# Patient Record
Sex: MALE | Race: WHITE | NOT HISPANIC OR LATINO | URBAN - METROPOLITAN AREA
[De-identification: names, ages, dates, MRNs, and addresses within clinical notes are randomized per-mention and may not be internally consistent; named-entity substitution may affect disease eponyms.]

---

## 2014-01-06 RX ORDER — RISPERIDONE 4 MG/1
1 TABLET ORAL
Qty: 0 | Refills: 0 | COMMUNITY
Start: 2014-01-06

## 2017-05-24 ENCOUNTER — INPATIENT (INPATIENT)
Facility: HOSPITAL | Age: 57
LOS: 6 days | Discharge: HOME | DRG: 392 | End: 2017-05-31
Attending: INTERNAL MEDICINE | Admitting: HOSPITALIST
Payer: MEDICARE

## 2017-05-24 VITALS
TEMPERATURE: 98 F | OXYGEN SATURATION: 98 % | SYSTOLIC BLOOD PRESSURE: 118 MMHG | HEART RATE: 69 BPM | RESPIRATION RATE: 18 BRPM | DIASTOLIC BLOOD PRESSURE: 80 MMHG

## 2017-05-24 LAB
ALBUMIN SERPL ELPH-MCNC: 4.3 G/DL — SIGNIFICANT CHANGE UP (ref 3.3–5)
ALP SERPL-CCNC: 42 U/L — SIGNIFICANT CHANGE UP (ref 40–120)
ALT FLD-CCNC: 13 U/L RC — SIGNIFICANT CHANGE UP (ref 10–45)
ANION GAP SERPL CALC-SCNC: 13 MMOL/L — SIGNIFICANT CHANGE UP (ref 5–17)
APTT BLD: 29.1 SEC — SIGNIFICANT CHANGE UP (ref 27.5–37.4)
AST SERPL-CCNC: 17 U/L — SIGNIFICANT CHANGE UP (ref 10–40)
BASE EXCESS BLDV CALC-SCNC: 3.8 MMOL/L — HIGH (ref -2–2)
BASOPHILS # BLD AUTO: 0 K/UL — SIGNIFICANT CHANGE UP (ref 0–0.2)
BASOPHILS NFR BLD AUTO: 0.6 % — SIGNIFICANT CHANGE UP (ref 0–2)
BILIRUB SERPL-MCNC: 0.3 MG/DL — SIGNIFICANT CHANGE UP (ref 0.2–1.2)
BUN SERPL-MCNC: 20 MG/DL — SIGNIFICANT CHANGE UP (ref 7–23)
CA-I SERPL-SCNC: 1.19 MMOL/L — SIGNIFICANT CHANGE UP (ref 1.12–1.3)
CALCIUM SERPL-MCNC: 9.4 MG/DL — SIGNIFICANT CHANGE UP (ref 8.4–10.5)
CHLORIDE BLDV-SCNC: 104 MMOL/L — SIGNIFICANT CHANGE UP (ref 96–108)
CHLORIDE SERPL-SCNC: 100 MMOL/L — SIGNIFICANT CHANGE UP (ref 96–108)
CO2 BLDV-SCNC: 31 MMOL/L — HIGH (ref 22–30)
CO2 SERPL-SCNC: 27 MMOL/L — SIGNIFICANT CHANGE UP (ref 22–31)
CREAT SERPL-MCNC: 1.29 MG/DL — SIGNIFICANT CHANGE UP (ref 0.5–1.3)
EOSINOPHIL # BLD AUTO: 0.3 K/UL — SIGNIFICANT CHANGE UP (ref 0–0.5)
EOSINOPHIL NFR BLD AUTO: 4.2 % — SIGNIFICANT CHANGE UP (ref 0–6)
GAS PNL BLDV: 134 MMOL/L — LOW (ref 136–145)
GAS PNL BLDV: SIGNIFICANT CHANGE UP
GLUCOSE BLDV-MCNC: 109 MG/DL — HIGH (ref 70–99)
GLUCOSE SERPL-MCNC: 107 MG/DL — HIGH (ref 70–99)
HCO3 BLDV-SCNC: 29 MMOL/L — SIGNIFICANT CHANGE UP (ref 21–29)
HCT VFR BLD CALC: 38.5 % — LOW (ref 39–50)
HCT VFR BLDA CALC: 42 % — SIGNIFICANT CHANGE UP (ref 39–50)
HGB BLD CALC-MCNC: 13.7 G/DL — SIGNIFICANT CHANGE UP (ref 13–17)
HGB BLD-MCNC: 13.7 G/DL — SIGNIFICANT CHANGE UP (ref 13–17)
INR BLD: 0.97 RATIO — SIGNIFICANT CHANGE UP (ref 0.88–1.16)
LACTATE BLDV-MCNC: 2.1 MMOL/L — HIGH (ref 0.7–2)
LYMPHOCYTES # BLD AUTO: 2.2 K/UL — SIGNIFICANT CHANGE UP (ref 1–3.3)
LYMPHOCYTES # BLD AUTO: 29.5 % — SIGNIFICANT CHANGE UP (ref 13–44)
MCHC RBC-ENTMCNC: 33.1 PG — SIGNIFICANT CHANGE UP (ref 27–34)
MCHC RBC-ENTMCNC: 35.6 GM/DL — SIGNIFICANT CHANGE UP (ref 32–36)
MCV RBC AUTO: 92.9 FL — SIGNIFICANT CHANGE UP (ref 80–100)
MONOCYTES # BLD AUTO: 0.9 K/UL — SIGNIFICANT CHANGE UP (ref 0–0.9)
MONOCYTES NFR BLD AUTO: 12.3 % — SIGNIFICANT CHANGE UP (ref 2–14)
NEUTROPHILS # BLD AUTO: 4 K/UL — SIGNIFICANT CHANGE UP (ref 1.8–7.4)
NEUTROPHILS NFR BLD AUTO: 53.4 % — SIGNIFICANT CHANGE UP (ref 43–77)
PCO2 BLDV: 50 MMHG — SIGNIFICANT CHANGE UP (ref 35–50)
PH BLDV: 7.39 — SIGNIFICANT CHANGE UP (ref 7.35–7.45)
PLATELET # BLD AUTO: 139 K/UL — LOW (ref 150–400)
PO2 BLDV: 43 MMHG — SIGNIFICANT CHANGE UP (ref 25–45)
POTASSIUM BLDV-SCNC: 3.9 MMOL/L — SIGNIFICANT CHANGE UP (ref 3.5–5)
POTASSIUM SERPL-MCNC: 4.3 MMOL/L — SIGNIFICANT CHANGE UP (ref 3.5–5.3)
POTASSIUM SERPL-SCNC: 4.3 MMOL/L — SIGNIFICANT CHANGE UP (ref 3.5–5.3)
PROT SERPL-MCNC: 7.3 G/DL — SIGNIFICANT CHANGE UP (ref 6–8.3)
PROTHROM AB SERPL-ACNC: 10.6 SEC — SIGNIFICANT CHANGE UP (ref 9.8–12.7)
RBC # BLD: 4.15 M/UL — LOW (ref 4.2–5.8)
RBC # FLD: 12.5 % — SIGNIFICANT CHANGE UP (ref 10.3–14.5)
SAO2 % BLDV: 76 % — SIGNIFICANT CHANGE UP (ref 67–88)
SODIUM SERPL-SCNC: 140 MMOL/L — SIGNIFICANT CHANGE UP (ref 135–145)
WBC # BLD: 7.5 K/UL — SIGNIFICANT CHANGE UP (ref 3.8–10.5)
WBC # FLD AUTO: 7.5 K/UL — SIGNIFICANT CHANGE UP (ref 3.8–10.5)

## 2017-05-24 PROCEDURE — 99285 EMERGENCY DEPT VISIT HI MDM: CPT

## 2017-05-24 PROCEDURE — 71010: CPT | Mod: 26

## 2017-05-24 PROCEDURE — 74177 CT ABD & PELVIS W/CONTRAST: CPT | Mod: 26

## 2017-05-24 RX ORDER — ACETAMINOPHEN 500 MG
1000 TABLET ORAL ONCE
Qty: 0 | Refills: 0 | Status: COMPLETED | OUTPATIENT
Start: 2017-05-24 | End: 2017-05-24

## 2017-05-24 RX ORDER — SODIUM CHLORIDE 9 MG/ML
1000 INJECTION INTRAMUSCULAR; INTRAVENOUS; SUBCUTANEOUS ONCE
Qty: 0 | Refills: 0 | Status: COMPLETED | OUTPATIENT
Start: 2017-05-24 | End: 2017-05-24

## 2017-05-24 RX ADMIN — Medication 400 MILLIGRAM(S): at 23:19

## 2017-05-24 RX ADMIN — SODIUM CHLORIDE 1000 MILLILITER(S): 9 INJECTION INTRAMUSCULAR; INTRAVENOUS; SUBCUTANEOUS at 21:17

## 2017-05-24 NOTE — ED PROVIDER NOTE - PROGRESS NOTE DETAILS
Paged dr. Vasquez x 3 over 3 hours. will call hospitalist to admit. Spoke with patient sister who is HCP. Agreeable to IV abx and admission. Paged dr. Vasquez x 3 over 3 hours. will call hospitalist to admit. Patient with divertic. given iv abx.

## 2017-05-24 NOTE — ED PROVIDER NOTE - PMH
Bipolar Disorder    Hydronephrosis with Renal and Ureteral Calculous Obstruction  at last admission in [facility] ([place]) 2010  Hypothyroidism    MR (mental retardation)    Nephrolithiasis

## 2017-05-24 NOTE — ED PROVIDER NOTE - CONSTITUTIONAL, MLM
normal... Well appearing, well nourished, awake, alert, oriented to person, place, time/situation and in no apparent distress. Well appearing, well nourished, slow to answer awake, alert, oriented to person, place, time/situation and in no apparent distress.

## 2017-05-24 NOTE — ED ADULT NURSE NOTE - OBJECTIVE STATEMENT
57 year old male with co abd pain/flank pain L sided. hx of kidney stones. pt coming from nursing home. no fever no chills.

## 2017-05-24 NOTE — ED PROVIDER NOTE - PRINCIPAL DIAGNOSIS
Diverticulitis of intestine without perforation or abscess without bleeding, unspecified part of intestinal tract

## 2017-05-24 NOTE — ED PROVIDER NOTE - MEDICAL DECISION MAKING DETAILS
José Antonio: Patient with abdominal pain over past 1 week. poor historian. history of stones. will get labs, ua, ivf, ct a/p, reassess.

## 2017-05-24 NOTE — ED PROVIDER NOTE - CARE PLAN
Principal Discharge DX:	Diverticulitis of intestine without perforation or abscess without bleeding, unspecified part of intestinal tract

## 2017-05-24 NOTE — ED PROVIDER NOTE - OBJECTIVE STATEMENT
57 year old male w NPH, Hypothyroidism, Mild intellectual difficulties, bipolar disorder prior renal stone presents w complaint of progressively worsening left flank pain and left sided abdominal pain for the past 1 week. Patient is a poor historian. Denies nausea, vomiting, fever. 57 year old male [facility] h/o intellectual disability, BPD, hypothyroidism, nephrolithiasis s/p lithotripsy, normal pressure hydrocephalus s/p drainage x2, hypercholesterolemia, Parkinson's disease vs drug-induced movement disorder prior renal stone presents w complaint of progressively worsening left flank pain and left sided abdominal pain for the past 1 week. Patient is a poor historian. Denies nausea, vomiting, fever. 57 year old male from [facility] h/o intellectual disability, BPD, hypothyroidism, nephrolithiasis s/p lithotripsy, normal pressure hydrocephalus s/p drainage x2, hypercholesterolemia, history of narcotic dependence, Parkinson's disease vs drug-induced movement disorder prior renal stone presents w complaint of progressively worsening left flank pain and left sided abdominal pain for the past 1 week. Patient is a poor historian. Denies nausea, vomiting, fever.

## 2017-05-25 DIAGNOSIS — Z29.9 ENCOUNTER FOR PROPHYLACTIC MEASURES, UNSPECIFIED: ICD-10-CM

## 2017-05-25 DIAGNOSIS — E03.9 HYPOTHYROIDISM, UNSPECIFIED: ICD-10-CM

## 2017-05-25 DIAGNOSIS — K57.92 DIVERTICULITIS OF INTESTINE, PART UNSPECIFIED, WITHOUT PERFORATION OR ABSCESS WITHOUT BLEEDING: ICD-10-CM

## 2017-05-25 DIAGNOSIS — F31.9 BIPOLAR DISORDER, UNSPECIFIED: ICD-10-CM

## 2017-05-25 LAB
ANION GAP SERPL CALC-SCNC: 12 MMOL/L — SIGNIFICANT CHANGE UP (ref 5–17)
APPEARANCE UR: CLEAR — SIGNIFICANT CHANGE UP
BASOPHILS # BLD AUTO: 0 K/UL — SIGNIFICANT CHANGE UP (ref 0–0.2)
BASOPHILS NFR BLD AUTO: 1 % — SIGNIFICANT CHANGE UP (ref 0–2)
BILIRUB UR-MCNC: NEGATIVE — SIGNIFICANT CHANGE UP
BUN SERPL-MCNC: 14 MG/DL — SIGNIFICANT CHANGE UP (ref 7–23)
CALCIUM SERPL-MCNC: 8.4 MG/DL — SIGNIFICANT CHANGE UP (ref 8.4–10.5)
CHLORIDE SERPL-SCNC: 105 MMOL/L — SIGNIFICANT CHANGE UP (ref 96–108)
CO2 SERPL-SCNC: 22 MMOL/L — SIGNIFICANT CHANGE UP (ref 22–31)
COLOR SPEC: YELLOW — SIGNIFICANT CHANGE UP
CREAT SERPL-MCNC: 0.91 MG/DL — SIGNIFICANT CHANGE UP (ref 0.5–1.3)
DIFF PNL FLD: NEGATIVE — SIGNIFICANT CHANGE UP
EOSINOPHIL # BLD AUTO: 0.2 K/UL — SIGNIFICANT CHANGE UP (ref 0–0.5)
EOSINOPHIL NFR BLD AUTO: 4.9 % — SIGNIFICANT CHANGE UP (ref 0–6)
GLUCOSE SERPL-MCNC: 89 MG/DL — SIGNIFICANT CHANGE UP (ref 70–99)
GLUCOSE UR QL: NEGATIVE — SIGNIFICANT CHANGE UP
HCT VFR BLD CALC: 35.3 % — LOW (ref 39–50)
HGB BLD-MCNC: 12.3 G/DL — LOW (ref 13–17)
KETONES UR-MCNC: NEGATIVE — SIGNIFICANT CHANGE UP
LEUKOCYTE ESTERASE UR-ACNC: NEGATIVE — SIGNIFICANT CHANGE UP
LYMPHOCYTES # BLD AUTO: 1.3 K/UL — SIGNIFICANT CHANGE UP (ref 1–3.3)
LYMPHOCYTES # BLD AUTO: 32.7 % — SIGNIFICANT CHANGE UP (ref 13–44)
MCHC RBC-ENTMCNC: 32.2 PG — SIGNIFICANT CHANGE UP (ref 27–34)
MCHC RBC-ENTMCNC: 34.9 GM/DL — SIGNIFICANT CHANGE UP (ref 32–36)
MCV RBC AUTO: 92.3 FL — SIGNIFICANT CHANGE UP (ref 80–100)
MONOCYTES # BLD AUTO: 0.5 K/UL — SIGNIFICANT CHANGE UP (ref 0–0.9)
MONOCYTES NFR BLD AUTO: 12.5 % — SIGNIFICANT CHANGE UP (ref 2–14)
NEUTROPHILS # BLD AUTO: 2 K/UL — SIGNIFICANT CHANGE UP (ref 1.8–7.4)
NEUTROPHILS NFR BLD AUTO: 48.9 % — SIGNIFICANT CHANGE UP (ref 43–77)
NITRITE UR-MCNC: NEGATIVE — SIGNIFICANT CHANGE UP
PH UR: 6.5 — SIGNIFICANT CHANGE UP (ref 5–8)
PLATELET # BLD AUTO: 116 K/UL — LOW (ref 150–400)
POTASSIUM SERPL-MCNC: 4.1 MMOL/L — SIGNIFICANT CHANGE UP (ref 3.5–5.3)
POTASSIUM SERPL-SCNC: 4.1 MMOL/L — SIGNIFICANT CHANGE UP (ref 3.5–5.3)
PROT UR-MCNC: NEGATIVE — SIGNIFICANT CHANGE UP
RBC # BLD: 3.83 M/UL — LOW (ref 4.2–5.8)
RBC # FLD: 12.5 % — SIGNIFICANT CHANGE UP (ref 10.3–14.5)
SODIUM SERPL-SCNC: 139 MMOL/L — SIGNIFICANT CHANGE UP (ref 135–145)
SP GR SPEC: 1.02 — SIGNIFICANT CHANGE UP (ref 1.01–1.02)
UROBILINOGEN FLD QL: NEGATIVE — SIGNIFICANT CHANGE UP
WBC # BLD: 4 K/UL — SIGNIFICANT CHANGE UP (ref 3.8–10.5)
WBC # FLD AUTO: 4 K/UL — SIGNIFICANT CHANGE UP (ref 3.8–10.5)

## 2017-05-25 PROCEDURE — 99223 1ST HOSP IP/OBS HIGH 75: CPT

## 2017-05-25 RX ORDER — MECLIZINE HCL 12.5 MG
25 TABLET ORAL AT BEDTIME
Qty: 0 | Refills: 0 | Status: DISCONTINUED | OUTPATIENT
Start: 2017-05-25 | End: 2017-05-31

## 2017-05-25 RX ORDER — METRONIDAZOLE 500 MG
TABLET ORAL
Qty: 0 | Refills: 0 | Status: DISCONTINUED | OUTPATIENT
Start: 2017-05-25 | End: 2017-05-28

## 2017-05-25 RX ORDER — BENZTROPINE MESYLATE 1 MG
0.5 TABLET ORAL DAILY
Qty: 0 | Refills: 0 | Status: DISCONTINUED | OUTPATIENT
Start: 2017-05-25 | End: 2017-05-31

## 2017-05-25 RX ORDER — MECLIZINE HCL 12.5 MG
12.5 TABLET ORAL DAILY
Qty: 0 | Refills: 0 | Status: DISCONTINUED | OUTPATIENT
Start: 2017-05-25 | End: 2017-05-31

## 2017-05-25 RX ORDER — CIPROFLOXACIN LACTATE 400MG/40ML
400 VIAL (ML) INTRAVENOUS EVERY 12 HOURS
Qty: 0 | Refills: 0 | Status: DISCONTINUED | OUTPATIENT
Start: 2017-05-25 | End: 2017-05-28

## 2017-05-25 RX ORDER — METRONIDAZOLE 500 MG
500 TABLET ORAL ONCE
Qty: 0 | Refills: 0 | Status: COMPLETED | OUTPATIENT
Start: 2017-05-25 | End: 2017-05-25

## 2017-05-25 RX ORDER — IBUPROFEN 200 MG
200 TABLET ORAL EVERY 8 HOURS
Qty: 0 | Refills: 0 | Status: DISCONTINUED | OUTPATIENT
Start: 2017-05-25 | End: 2017-05-26

## 2017-05-25 RX ORDER — KETOROLAC TROMETHAMINE 30 MG/ML
15 SYRINGE (ML) INJECTION ONCE
Qty: 0 | Refills: 0 | Status: DISCONTINUED | OUTPATIENT
Start: 2017-05-25 | End: 2017-05-25

## 2017-05-25 RX ORDER — SIMVASTATIN 20 MG/1
20 TABLET, FILM COATED ORAL AT BEDTIME
Qty: 0 | Refills: 0 | Status: DISCONTINUED | OUTPATIENT
Start: 2017-05-25 | End: 2017-05-31

## 2017-05-25 RX ORDER — ACETAMINOPHEN 500 MG
500 TABLET ORAL EVERY 6 HOURS
Qty: 0 | Refills: 0 | Status: DISCONTINUED | OUTPATIENT
Start: 2017-05-25 | End: 2017-05-31

## 2017-05-25 RX ORDER — ONDANSETRON 8 MG/1
4 TABLET, FILM COATED ORAL ONCE
Qty: 0 | Refills: 0 | Status: COMPLETED | OUTPATIENT
Start: 2017-05-25 | End: 2017-05-25

## 2017-05-25 RX ORDER — RISPERIDONE 4 MG/1
1.5 TABLET ORAL
Qty: 0 | Refills: 0 | Status: DISCONTINUED | OUTPATIENT
Start: 2017-05-25 | End: 2017-05-31

## 2017-05-25 RX ORDER — PANTOPRAZOLE SODIUM 20 MG/1
40 TABLET, DELAYED RELEASE ORAL
Qty: 0 | Refills: 0 | Status: DISCONTINUED | OUTPATIENT
Start: 2017-05-25 | End: 2017-05-31

## 2017-05-25 RX ORDER — CIPROFLOXACIN LACTATE 400MG/40ML
400 VIAL (ML) INTRAVENOUS ONCE
Qty: 0 | Refills: 0 | Status: COMPLETED | OUTPATIENT
Start: 2017-05-25 | End: 2017-05-25

## 2017-05-25 RX ORDER — DIVALPROEX SODIUM 500 MG/1
750 TABLET, DELAYED RELEASE ORAL
Qty: 0 | Refills: 0 | Status: DISCONTINUED | OUTPATIENT
Start: 2017-05-25 | End: 2017-05-31

## 2017-05-25 RX ORDER — LEVOTHYROXINE SODIUM 125 MCG
75 TABLET ORAL DAILY
Qty: 0 | Refills: 0 | Status: DISCONTINUED | OUTPATIENT
Start: 2017-05-25 | End: 2017-05-31

## 2017-05-25 RX ORDER — SODIUM CHLORIDE 9 MG/ML
1000 INJECTION INTRAMUSCULAR; INTRAVENOUS; SUBCUTANEOUS ONCE
Qty: 0 | Refills: 0 | Status: COMPLETED | OUTPATIENT
Start: 2017-05-25 | End: 2017-05-25

## 2017-05-25 RX ORDER — DOCUSATE SODIUM 100 MG
300 CAPSULE ORAL DAILY
Qty: 0 | Refills: 0 | Status: DISCONTINUED | OUTPATIENT
Start: 2017-05-25 | End: 2017-05-31

## 2017-05-25 RX ORDER — ONDANSETRON 8 MG/1
4 TABLET, FILM COATED ORAL EVERY 6 HOURS
Qty: 0 | Refills: 0 | Status: DISCONTINUED | OUTPATIENT
Start: 2017-05-25 | End: 2017-05-31

## 2017-05-25 RX ORDER — HEPARIN SODIUM 5000 [USP'U]/ML
5000 INJECTION INTRAVENOUS; SUBCUTANEOUS EVERY 12 HOURS
Qty: 0 | Refills: 0 | Status: DISCONTINUED | OUTPATIENT
Start: 2017-05-25 | End: 2017-05-28

## 2017-05-25 RX ORDER — METRONIDAZOLE 500 MG
500 TABLET ORAL EVERY 8 HOURS
Qty: 0 | Refills: 0 | Status: DISCONTINUED | OUTPATIENT
Start: 2017-05-25 | End: 2017-05-28

## 2017-05-25 RX ADMIN — DIVALPROEX SODIUM 750 MILLIGRAM(S): 500 TABLET, DELAYED RELEASE ORAL at 17:49

## 2017-05-25 RX ADMIN — Medication 0.5 MILLIGRAM(S): at 17:49

## 2017-05-25 RX ADMIN — RISPERIDONE 1.5 MILLIGRAM(S): 4 TABLET ORAL at 17:49

## 2017-05-25 RX ADMIN — Medication 75 MICROGRAM(S): at 12:42

## 2017-05-25 RX ADMIN — Medication 1000 MILLIGRAM(S): at 00:36

## 2017-05-25 RX ADMIN — HEPARIN SODIUM 5000 UNIT(S): 5000 INJECTION INTRAVENOUS; SUBCUTANEOUS at 17:48

## 2017-05-25 RX ADMIN — Medication 100 MILLIGRAM(S): at 11:26

## 2017-05-25 RX ADMIN — Medication 200 MILLIGRAM(S): at 17:48

## 2017-05-25 RX ADMIN — SIMVASTATIN 20 MILLIGRAM(S): 20 TABLET, FILM COATED ORAL at 21:33

## 2017-05-25 RX ADMIN — SODIUM CHLORIDE 1000 MILLILITER(S): 9 INJECTION INTRAMUSCULAR; INTRAVENOUS; SUBCUTANEOUS at 00:36

## 2017-05-25 RX ADMIN — Medication 300 MILLIGRAM(S): at 12:41

## 2017-05-25 RX ADMIN — Medication 200 MILLIGRAM(S): at 01:08

## 2017-05-25 RX ADMIN — Medication 100 MILLIGRAM(S): at 21:31

## 2017-05-25 RX ADMIN — PANTOPRAZOLE SODIUM 40 MILLIGRAM(S): 20 TABLET, DELAYED RELEASE ORAL at 12:42

## 2017-05-25 RX ADMIN — Medication 100 MILLIGRAM(S): at 01:00

## 2017-05-25 RX ADMIN — Medication 15 MILLIGRAM(S): at 06:31

## 2017-05-25 RX ADMIN — ONDANSETRON 4 MILLIGRAM(S): 8 TABLET, FILM COATED ORAL at 01:55

## 2017-05-25 NOTE — H&P ADULT. - LAB RESULTS AND INTERPRETATION
Personally reviewed labs. Labs notable for no leukocytosis WBC 7.5 Hb 13.7 Plt 139 BMP Cr 1.29 K 4.3 LFTs wnl;  VBG with lactae 2.1  UA negative.

## 2017-05-25 NOTE — H&P ADULT. - NSHPATTENDINGPLANDISCUSS_GEN_ALL_CORE
Jared (patient's sister and RN niece) per his request regarding plan of care. Additionally d/w covering PA clinical assessment and plan of care.

## 2017-05-25 NOTE — H&P ADULT. - ASSESSMENT
56M from [facility] h/o intellectual disability, BPD, hypothyroidism, nephrolithiasis s/p lithotripsy, normal pressure hydrocephalus s/p drainage x2, hypercholesterolemia, Parkinson's disease vs drug-induced movement disorder, GERD p/w abdominal pain likely 2/2 acute diverticulitis seen on CT Abd/Pelvis.

## 2017-05-25 NOTE — H&P ADULT. - PMH
Bipolar Disorder    Hydronephrosis with Renal and Ureteral Calculous Obstruction  at last admission in [facility] ([place]) 2010  Hypothyroidism    MR (mental retardation)    Nephrolithiasis

## 2017-05-25 NOTE — H&P ADULT. - PROBLEM SELECTOR PLAN 1
p/w abdominal pain and nausea. Tolerating PO; No leukocytosis, lactate 2.1; Appropriately hydrated.   - Flagyl/Cipro IV; if able to tolerate PO and pain well controlled, then switch to PO regimen   - Tylenol/Motrin prn pain; pt and family requested no narcotics  - Will trial regular diet per patient request and if unable to tolerate then will start full liquids  - c/w colace, held other bowel regimen given current symptom of diarrhea  - Consider GI consult if symptoms fail to improve; Per patient's sister and niece his last colo was 8 yrs ago and they have significant fam hx of colon Ca and are concerned with setting up OP follow up with GI for colo. Explained that colo often deferred in setting of acute diverticulitis.

## 2017-05-25 NOTE — H&P ADULT. - FAMILY HISTORY
Sibling  Still living? Yes, Estimated age: 51-60  Family history of hyperparathyroidism, Age at diagnosis: Age Unknown

## 2017-05-25 NOTE — PATIENT PROFILE ADULT. - VISION (WITH CORRECTIVE LENSES IF THE PATIENT USUALLY WEARS THEM):
Reading glasses/Normal vision: sees adequately in most situations; can see medication labels, newsprint

## 2017-05-25 NOTE — H&P ADULT. - RADIOLOGY RESULTS AND INTERPRETATION
Personally reviewed imaging. CT Abd Pelvis with evidence of acute diverticulitis and no evidence of acute collection. CXR clear

## 2017-05-25 NOTE — H&P ADULT. - OTHER
Reviewed previous hospitalization records in [facility]; last hospitalized Sept 2016 for syncope found to have hyperparathyroidism now s/p parathyroidectomy and also found to have vertigo.

## 2017-05-25 NOTE — H&P ADULT. - HISTORY OF PRESENT ILLNESS
57 yo gentleman from [facility] h/o intellectual disability, BPD, hypothyroidism, nephrolithiasis s/p lithotripsy, normal pressure hydrocephalus s/p drainage x2, hypercholesterolemia, Parkinson's disease vs drug-induced movement disorder, GERD presenting       Initial vitals--Afebrile, HR 69, 118/80 18 98%   Labs notable for no leukocytosis WBC 7.5 Hb 13.7 Plt 139 BMP Cr 1.29 K 4.3 LFTs wnl;  VBG with lactae 2.1  UA negative. CT Abd Pelvis with evidence of acute diverticulitis and no evidence of acute collection. CXR clear     In the ED, he received Cipro/Flagyl, 2L Ns, Toradol, Tylenol and Zofran     Admitted to medicine for further evaluation and management. 55 yo gentleman from [facility] w/intellectual disability, BPD, hypothyroidism, nephrolithiasis s/p lithotripsy, normal pressure hydrocephalus s/p drainage x2, hypercholesterolemia, Parkinson's disease vs drug-induced movement disorder, GERD and hx of narcotic dependence p/w abdominal pain for the past month worse over the last few days. He reports subjective fever once last week and diarrhea without blood. He described the abdominal pain as intermittent 7/10 worse after eating. Reported associated nausea, no emesis. No recent travel or sick contacts. No dysuria. Does report flank pain. He reported that he does not want any morphine or percocet for pain control given his hx of abuse and would prefer to take tylenol and motrin only. ROS otherwise negative.     Initial vitals--Afebrile, HR 69, 118/80 18 98%   Labs notable for no leukocytosis WBC 7.5 Hb 13.7 Plt 139 BMP Cr 1.29 K 4.3 LFTs wnl;  VBG with lactae 2.1  UA negative. CT Abd Pelvis with evidence of acute diverticulitis and no evidence of acute collection. CXR clear     In the ED, he received Cipro/Flagyl, 2L Ns, Toradol, Tylenol and Zofran     Admitted to medicine for further evaluation and management.

## 2017-05-26 LAB
ANION GAP SERPL CALC-SCNC: 10 MMOL/L — SIGNIFICANT CHANGE UP (ref 5–17)
BASOPHILS # BLD AUTO: 0.03 K/UL — SIGNIFICANT CHANGE UP (ref 0–0.2)
BASOPHILS NFR BLD AUTO: 0.7 % — SIGNIFICANT CHANGE UP (ref 0–2)
BUN SERPL-MCNC: 14 MG/DL — SIGNIFICANT CHANGE UP (ref 7–23)
CALCIUM SERPL-MCNC: 9 MG/DL — SIGNIFICANT CHANGE UP (ref 8.4–10.5)
CHLORIDE SERPL-SCNC: 108 MMOL/L — SIGNIFICANT CHANGE UP (ref 96–108)
CO2 SERPL-SCNC: 24 MMOL/L — SIGNIFICANT CHANGE UP (ref 22–31)
CREAT SERPL-MCNC: 1.11 MG/DL — SIGNIFICANT CHANGE UP (ref 0.5–1.3)
EOSINOPHIL # BLD AUTO: 0.38 K/UL — SIGNIFICANT CHANGE UP (ref 0–0.5)
EOSINOPHIL NFR BLD AUTO: 9.1 % — HIGH (ref 0–6)
GLUCOSE SERPL-MCNC: 85 MG/DL — SIGNIFICANT CHANGE UP (ref 70–99)
HCT VFR BLD CALC: 40.7 % — SIGNIFICANT CHANGE UP (ref 39–50)
HGB BLD-MCNC: 13.1 G/DL — SIGNIFICANT CHANGE UP (ref 13–17)
IMM GRANULOCYTES NFR BLD AUTO: 0.2 % — SIGNIFICANT CHANGE UP (ref 0–1.5)
LYMPHOCYTES # BLD AUTO: 1.74 K/UL — SIGNIFICANT CHANGE UP (ref 1–3.3)
LYMPHOCYTES # BLD AUTO: 41.6 % — SIGNIFICANT CHANGE UP (ref 13–44)
MAGNESIUM SERPL-MCNC: 2.3 MG/DL — SIGNIFICANT CHANGE UP (ref 1.6–2.6)
MCHC RBC-ENTMCNC: 30 PG — SIGNIFICANT CHANGE UP (ref 27–34)
MCHC RBC-ENTMCNC: 32.2 GM/DL — SIGNIFICANT CHANGE UP (ref 32–36)
MCV RBC AUTO: 93.3 FL — SIGNIFICANT CHANGE UP (ref 80–100)
MONOCYTES # BLD AUTO: 0.54 K/UL — SIGNIFICANT CHANGE UP (ref 0–0.9)
MONOCYTES NFR BLD AUTO: 12.9 % — SIGNIFICANT CHANGE UP (ref 2–14)
NEUTROPHILS # BLD AUTO: 1.48 K/UL — LOW (ref 1.8–7.4)
NEUTROPHILS NFR BLD AUTO: 35.5 % — LOW (ref 43–77)
PLATELET # BLD AUTO: 132 K/UL — LOW (ref 150–400)
POTASSIUM SERPL-MCNC: 4.5 MMOL/L — SIGNIFICANT CHANGE UP (ref 3.5–5.3)
POTASSIUM SERPL-SCNC: 4.5 MMOL/L — SIGNIFICANT CHANGE UP (ref 3.5–5.3)
RBC # BLD: 4.36 M/UL — SIGNIFICANT CHANGE UP (ref 4.2–5.8)
RBC # FLD: 13.9 % — SIGNIFICANT CHANGE UP (ref 10.3–14.5)
SODIUM SERPL-SCNC: 142 MMOL/L — SIGNIFICANT CHANGE UP (ref 135–145)
WBC # BLD: 4.18 K/UL — SIGNIFICANT CHANGE UP (ref 3.8–10.5)
WBC # FLD AUTO: 4.18 K/UL — SIGNIFICANT CHANGE UP (ref 3.8–10.5)

## 2017-05-26 RX ORDER — IBUPROFEN 200 MG
800 TABLET ORAL EVERY 8 HOURS
Qty: 0 | Refills: 0 | Status: DISCONTINUED | OUTPATIENT
Start: 2017-05-26 | End: 2017-05-31

## 2017-05-26 RX ORDER — SODIUM CHLORIDE 9 MG/ML
1000 INJECTION INTRAMUSCULAR; INTRAVENOUS; SUBCUTANEOUS
Qty: 0 | Refills: 0 | Status: DISCONTINUED | OUTPATIENT
Start: 2017-05-26 | End: 2017-05-29

## 2017-05-26 RX ADMIN — Medication 0.5 MILLIGRAM(S): at 12:18

## 2017-05-26 RX ADMIN — Medication 75 MICROGRAM(S): at 06:28

## 2017-05-26 RX ADMIN — Medication 25 MILLIGRAM(S): at 00:39

## 2017-05-26 RX ADMIN — PANTOPRAZOLE SODIUM 40 MILLIGRAM(S): 20 TABLET, DELAYED RELEASE ORAL at 06:28

## 2017-05-26 RX ADMIN — Medication 100 MILLIGRAM(S): at 06:20

## 2017-05-26 RX ADMIN — Medication 500 MILLIGRAM(S): at 19:32

## 2017-05-26 RX ADMIN — Medication 800 MILLIGRAM(S): at 17:00

## 2017-05-26 RX ADMIN — Medication 100 MILLIGRAM(S): at 21:17

## 2017-05-26 RX ADMIN — RISPERIDONE 1.5 MILLIGRAM(S): 4 TABLET ORAL at 06:27

## 2017-05-26 RX ADMIN — Medication 100 MILLIGRAM(S): at 15:16

## 2017-05-26 RX ADMIN — HEPARIN SODIUM 5000 UNIT(S): 5000 INJECTION INTRAVENOUS; SUBCUTANEOUS at 16:57

## 2017-05-26 RX ADMIN — Medication 300 MILLIGRAM(S): at 12:18

## 2017-05-26 RX ADMIN — ONDANSETRON 4 MILLIGRAM(S): 8 TABLET, FILM COATED ORAL at 15:33

## 2017-05-26 RX ADMIN — Medication 500 MILLIGRAM(S): at 20:02

## 2017-05-26 RX ADMIN — HEPARIN SODIUM 5000 UNIT(S): 5000 INJECTION INTRAVENOUS; SUBCUTANEOUS at 06:27

## 2017-05-26 RX ADMIN — SIMVASTATIN 20 MILLIGRAM(S): 20 TABLET, FILM COATED ORAL at 21:17

## 2017-05-26 RX ADMIN — DIVALPROEX SODIUM 750 MILLIGRAM(S): 500 TABLET, DELAYED RELEASE ORAL at 06:27

## 2017-05-26 RX ADMIN — Medication 25 MILLIGRAM(S): at 21:17

## 2017-05-26 RX ADMIN — Medication 200 MILLIGRAM(S): at 17:16

## 2017-05-26 RX ADMIN — Medication 12.5 MILLIGRAM(S): at 12:18

## 2017-05-26 RX ADMIN — Medication 200 MILLIGRAM(S): at 06:28

## 2017-05-26 RX ADMIN — Medication 800 MILLIGRAM(S): at 16:32

## 2017-05-26 RX ADMIN — RISPERIDONE 1.5 MILLIGRAM(S): 4 TABLET ORAL at 16:57

## 2017-05-26 RX ADMIN — DIVALPROEX SODIUM 750 MILLIGRAM(S): 500 TABLET, DELAYED RELEASE ORAL at 16:57

## 2017-05-27 LAB
BASOPHILS # BLD AUTO: 0.03 K/UL — SIGNIFICANT CHANGE UP (ref 0–0.2)
BASOPHILS NFR BLD AUTO: 0.6 % — SIGNIFICANT CHANGE UP (ref 0–2)
EOSINOPHIL # BLD AUTO: 0.37 K/UL — SIGNIFICANT CHANGE UP (ref 0–0.5)
EOSINOPHIL NFR BLD AUTO: 7.8 % — HIGH (ref 0–6)
HCT VFR BLD CALC: 37.4 % — LOW (ref 39–50)
HGB BLD-MCNC: 12.6 G/DL — LOW (ref 13–17)
IMM GRANULOCYTES NFR BLD AUTO: 0.2 % — SIGNIFICANT CHANGE UP (ref 0–1.5)
LYMPHOCYTES # BLD AUTO: 1.75 K/UL — SIGNIFICANT CHANGE UP (ref 1–3.3)
LYMPHOCYTES # BLD AUTO: 36.9 % — SIGNIFICANT CHANGE UP (ref 13–44)
MCHC RBC-ENTMCNC: 30.5 PG — SIGNIFICANT CHANGE UP (ref 27–34)
MCHC RBC-ENTMCNC: 33.7 GM/DL — SIGNIFICANT CHANGE UP (ref 32–36)
MCV RBC AUTO: 90.6 FL — SIGNIFICANT CHANGE UP (ref 80–100)
MONOCYTES # BLD AUTO: 0.63 K/UL — SIGNIFICANT CHANGE UP (ref 0–0.9)
MONOCYTES NFR BLD AUTO: 13.3 % — SIGNIFICANT CHANGE UP (ref 2–14)
NEUTROPHILS # BLD AUTO: 1.95 K/UL — SIGNIFICANT CHANGE UP (ref 1.8–7.4)
NEUTROPHILS NFR BLD AUTO: 41.2 % — LOW (ref 43–77)
PLATELET # BLD AUTO: 160 K/UL — SIGNIFICANT CHANGE UP (ref 150–400)
RBC # BLD: 4.13 M/UL — LOW (ref 4.2–5.8)
RBC # FLD: 13.7 % — SIGNIFICANT CHANGE UP (ref 10.3–14.5)
WBC # BLD: 4.74 K/UL — SIGNIFICANT CHANGE UP (ref 3.8–10.5)
WBC # FLD AUTO: 4.74 K/UL — SIGNIFICANT CHANGE UP (ref 3.8–10.5)

## 2017-05-27 RX ORDER — ACETAMINOPHEN 500 MG
1000 TABLET ORAL ONCE
Qty: 0 | Refills: 0 | Status: COMPLETED | OUTPATIENT
Start: 2017-05-27 | End: 2017-05-27

## 2017-05-27 RX ORDER — POLYETHYLENE GLYCOL 3350 17 G/17G
17 POWDER, FOR SOLUTION ORAL DAILY
Qty: 0 | Refills: 0 | Status: DISCONTINUED | OUTPATIENT
Start: 2017-05-27 | End: 2017-05-28

## 2017-05-27 RX ORDER — POLYETHYLENE GLYCOL 3350 17 G/17G
17 POWDER, FOR SOLUTION ORAL
Qty: 0 | Refills: 0 | Status: DISCONTINUED | OUTPATIENT
Start: 2017-05-27 | End: 2017-05-28

## 2017-05-27 RX ORDER — SENNA PLUS 8.6 MG/1
2 TABLET ORAL AT BEDTIME
Qty: 0 | Refills: 0 | Status: DISCONTINUED | OUTPATIENT
Start: 2017-05-27 | End: 2017-05-31

## 2017-05-27 RX ADMIN — PANTOPRAZOLE SODIUM 40 MILLIGRAM(S): 20 TABLET, DELAYED RELEASE ORAL at 06:43

## 2017-05-27 RX ADMIN — Medication 800 MILLIGRAM(S): at 03:57

## 2017-05-27 RX ADMIN — ONDANSETRON 4 MILLIGRAM(S): 8 TABLET, FILM COATED ORAL at 16:50

## 2017-05-27 RX ADMIN — Medication 800 MILLIGRAM(S): at 03:27

## 2017-05-27 RX ADMIN — Medication 500 MILLIGRAM(S): at 08:57

## 2017-05-27 RX ADMIN — Medication 800 MILLIGRAM(S): at 12:10

## 2017-05-27 RX ADMIN — Medication 1000 MILLIGRAM(S): at 17:36

## 2017-05-27 RX ADMIN — SENNA PLUS 2 TABLET(S): 8.6 TABLET ORAL at 22:14

## 2017-05-27 RX ADMIN — Medication 75 MICROGRAM(S): at 06:43

## 2017-05-27 RX ADMIN — Medication 100 MILLIGRAM(S): at 06:42

## 2017-05-27 RX ADMIN — Medication 100 MILLIGRAM(S): at 13:25

## 2017-05-27 RX ADMIN — Medication 12.5 MILLIGRAM(S): at 12:10

## 2017-05-27 RX ADMIN — DIVALPROEX SODIUM 750 MILLIGRAM(S): 500 TABLET, DELAYED RELEASE ORAL at 17:11

## 2017-05-27 RX ADMIN — Medication 0.5 MILLIGRAM(S): at 12:09

## 2017-05-27 RX ADMIN — SIMVASTATIN 20 MILLIGRAM(S): 20 TABLET, FILM COATED ORAL at 22:14

## 2017-05-27 RX ADMIN — ONDANSETRON 4 MILLIGRAM(S): 8 TABLET, FILM COATED ORAL at 03:27

## 2017-05-27 RX ADMIN — SODIUM CHLORIDE 100 MILLILITER(S): 9 INJECTION INTRAMUSCULAR; INTRAVENOUS; SUBCUTANEOUS at 22:14

## 2017-05-27 RX ADMIN — Medication 300 MILLIGRAM(S): at 12:09

## 2017-05-27 RX ADMIN — DIVALPROEX SODIUM 750 MILLIGRAM(S): 500 TABLET, DELAYED RELEASE ORAL at 06:44

## 2017-05-27 RX ADMIN — Medication 800 MILLIGRAM(S): at 12:40

## 2017-05-27 RX ADMIN — POLYETHYLENE GLYCOL 3350 17 GRAM(S): 17 POWDER, FOR SOLUTION ORAL at 09:31

## 2017-05-27 RX ADMIN — RISPERIDONE 1.5 MILLIGRAM(S): 4 TABLET ORAL at 06:43

## 2017-05-27 RX ADMIN — Medication 100 MILLIGRAM(S): at 22:13

## 2017-05-27 RX ADMIN — Medication 200 MILLIGRAM(S): at 08:27

## 2017-05-27 RX ADMIN — Medication 500 MILLIGRAM(S): at 08:27

## 2017-05-27 RX ADMIN — HEPARIN SODIUM 5000 UNIT(S): 5000 INJECTION INTRAVENOUS; SUBCUTANEOUS at 17:11

## 2017-05-27 RX ADMIN — ONDANSETRON 4 MILLIGRAM(S): 8 TABLET, FILM COATED ORAL at 09:31

## 2017-05-27 RX ADMIN — Medication 400 MILLIGRAM(S): at 17:06

## 2017-05-27 RX ADMIN — RISPERIDONE 1.5 MILLIGRAM(S): 4 TABLET ORAL at 17:11

## 2017-05-27 RX ADMIN — Medication 25 MILLIGRAM(S): at 22:14

## 2017-05-27 RX ADMIN — Medication 200 MILLIGRAM(S): at 17:52

## 2017-05-27 RX ADMIN — HEPARIN SODIUM 5000 UNIT(S): 5000 INJECTION INTRAVENOUS; SUBCUTANEOUS at 06:43

## 2017-05-28 LAB
ANION GAP SERPL CALC-SCNC: 17 MMOL/L — SIGNIFICANT CHANGE UP (ref 5–17)
BUN SERPL-MCNC: 11 MG/DL — SIGNIFICANT CHANGE UP (ref 7–23)
CALCIUM SERPL-MCNC: 9.4 MG/DL — SIGNIFICANT CHANGE UP (ref 8.4–10.5)
CHLORIDE SERPL-SCNC: 104 MMOL/L — SIGNIFICANT CHANGE UP (ref 96–108)
CO2 SERPL-SCNC: 19 MMOL/L — LOW (ref 22–31)
CREAT SERPL-MCNC: 1.19 MG/DL — SIGNIFICANT CHANGE UP (ref 0.5–1.3)
GLUCOSE SERPL-MCNC: 104 MG/DL — HIGH (ref 70–99)
HCT VFR BLD CALC: 40.1 % — SIGNIFICANT CHANGE UP (ref 39–50)
HGB BLD-MCNC: 13 G/DL — SIGNIFICANT CHANGE UP (ref 13–17)
MCHC RBC-ENTMCNC: 30.4 PG — SIGNIFICANT CHANGE UP (ref 27–34)
MCHC RBC-ENTMCNC: 32.4 GM/DL — SIGNIFICANT CHANGE UP (ref 32–36)
MCV RBC AUTO: 93.7 FL — SIGNIFICANT CHANGE UP (ref 80–100)
PLATELET # BLD AUTO: 146 K/UL — LOW (ref 150–400)
POTASSIUM SERPL-MCNC: 4.3 MMOL/L — SIGNIFICANT CHANGE UP (ref 3.5–5.3)
POTASSIUM SERPL-SCNC: 4.3 MMOL/L — SIGNIFICANT CHANGE UP (ref 3.5–5.3)
RBC # BLD: 4.28 M/UL — SIGNIFICANT CHANGE UP (ref 4.2–5.8)
RBC # FLD: 14.1 % — SIGNIFICANT CHANGE UP (ref 10.3–14.5)
SODIUM SERPL-SCNC: 140 MMOL/L — SIGNIFICANT CHANGE UP (ref 135–145)
WBC # BLD: 4.72 K/UL — SIGNIFICANT CHANGE UP (ref 3.8–10.5)
WBC # FLD AUTO: 4.72 K/UL — SIGNIFICANT CHANGE UP (ref 3.8–10.5)

## 2017-05-28 RX ORDER — POLYETHYLENE GLYCOL 3350 17 G/17G
17 POWDER, FOR SOLUTION ORAL DAILY
Qty: 0 | Refills: 0 | Status: DISCONTINUED | OUTPATIENT
Start: 2017-05-28 | End: 2017-05-31

## 2017-05-28 RX ADMIN — ONDANSETRON 4 MILLIGRAM(S): 8 TABLET, FILM COATED ORAL at 10:29

## 2017-05-28 RX ADMIN — Medication 100 MILLIGRAM(S): at 05:48

## 2017-05-28 RX ADMIN — Medication 500 MILLIGRAM(S): at 20:18

## 2017-05-28 RX ADMIN — Medication 500 MILLIGRAM(S): at 20:48

## 2017-05-28 RX ADMIN — RISPERIDONE 1.5 MILLIGRAM(S): 4 TABLET ORAL at 19:13

## 2017-05-28 RX ADMIN — Medication 800 MILLIGRAM(S): at 14:40

## 2017-05-28 RX ADMIN — DIVALPROEX SODIUM 750 MILLIGRAM(S): 500 TABLET, DELAYED RELEASE ORAL at 05:48

## 2017-05-28 RX ADMIN — ONDANSETRON 4 MILLIGRAM(S): 8 TABLET, FILM COATED ORAL at 20:17

## 2017-05-28 RX ADMIN — DIVALPROEX SODIUM 750 MILLIGRAM(S): 500 TABLET, DELAYED RELEASE ORAL at 19:12

## 2017-05-28 RX ADMIN — Medication 12.5 MILLIGRAM(S): at 12:55

## 2017-05-28 RX ADMIN — SODIUM CHLORIDE 100 MILLILITER(S): 9 INJECTION INTRAMUSCULAR; INTRAVENOUS; SUBCUTANEOUS at 12:52

## 2017-05-28 RX ADMIN — Medication 200 MILLIGRAM(S): at 05:47

## 2017-05-28 RX ADMIN — SIMVASTATIN 20 MILLIGRAM(S): 20 TABLET, FILM COATED ORAL at 22:08

## 2017-05-28 RX ADMIN — RISPERIDONE 1.5 MILLIGRAM(S): 4 TABLET ORAL at 05:48

## 2017-05-28 RX ADMIN — Medication 800 MILLIGRAM(S): at 04:14

## 2017-05-28 RX ADMIN — Medication 1 TABLET(S): at 19:13

## 2017-05-28 RX ADMIN — POLYETHYLENE GLYCOL 3350 17 GRAM(S): 17 POWDER, FOR SOLUTION ORAL at 05:47

## 2017-05-28 RX ADMIN — Medication 75 MICROGRAM(S): at 05:48

## 2017-05-28 RX ADMIN — PANTOPRAZOLE SODIUM 40 MILLIGRAM(S): 20 TABLET, DELAYED RELEASE ORAL at 05:48

## 2017-05-28 RX ADMIN — Medication 0.5 MILLIGRAM(S): at 12:54

## 2017-05-28 RX ADMIN — Medication 800 MILLIGRAM(S): at 12:56

## 2017-05-28 RX ADMIN — SODIUM CHLORIDE 100 MILLILITER(S): 9 INJECTION INTRAMUSCULAR; INTRAVENOUS; SUBCUTANEOUS at 22:14

## 2017-05-28 RX ADMIN — ONDANSETRON 4 MILLIGRAM(S): 8 TABLET, FILM COATED ORAL at 04:14

## 2017-05-28 RX ADMIN — HEPARIN SODIUM 5000 UNIT(S): 5000 INJECTION INTRAVENOUS; SUBCUTANEOUS at 05:48

## 2017-05-28 RX ADMIN — Medication 25 MILLIGRAM(S): at 22:08

## 2017-05-28 RX ADMIN — Medication 300 MILLIGRAM(S): at 12:55

## 2017-05-28 RX ADMIN — Medication 800 MILLIGRAM(S): at 04:44

## 2017-05-28 RX ADMIN — SENNA PLUS 2 TABLET(S): 8.6 TABLET ORAL at 22:08

## 2017-05-28 NOTE — PHYSICAL THERAPY INITIAL EVALUATION ADULT - ADDITIONAL COMMENTS
Pt states he lives in [facility]. Pt states he ambulates with RW and need assistance for all ADLs and ambulation.

## 2017-05-28 NOTE — PHYSICAL THERAPY INITIAL EVALUATION ADULT - PERTINENT HX OF CURRENT PROBLEM, REHAB EVAL
Pt is a 57 yo gentleman from [facility] w/intellectual disability, BPD, hypothyroidism, nephrolithiasis s/p lithotripsy, normal pressure hydrocephalus s/p drainage x2, hypercholesterolemia, Parkinson's disease vs drug-induced movement disorder, GERD and hx of narcotic dependence p/w abdominal pain for the past month worse over the last few days. +CT Abd and pelvis: Acute diverticulitis of the proximal sigmoid colon.

## 2017-05-29 RX ORDER — SUCRALFATE 1 G
1 TABLET ORAL
Qty: 0 | Refills: 0 | Status: DISCONTINUED | OUTPATIENT
Start: 2017-05-29 | End: 2017-05-31

## 2017-05-29 RX ORDER — SODIUM CHLORIDE 9 MG/ML
1000 INJECTION INTRAMUSCULAR; INTRAVENOUS; SUBCUTANEOUS
Qty: 0 | Refills: 0 | Status: DISCONTINUED | OUTPATIENT
Start: 2017-05-29 | End: 2017-05-31

## 2017-05-29 RX ADMIN — Medication 800 MILLIGRAM(S): at 19:09

## 2017-05-29 RX ADMIN — SENNA PLUS 2 TABLET(S): 8.6 TABLET ORAL at 21:24

## 2017-05-29 RX ADMIN — Medication 25 MILLIGRAM(S): at 21:24

## 2017-05-29 RX ADMIN — ONDANSETRON 4 MILLIGRAM(S): 8 TABLET, FILM COATED ORAL at 03:31

## 2017-05-29 RX ADMIN — ONDANSETRON 4 MILLIGRAM(S): 8 TABLET, FILM COATED ORAL at 19:10

## 2017-05-29 RX ADMIN — RISPERIDONE 1.5 MILLIGRAM(S): 4 TABLET ORAL at 19:09

## 2017-05-29 RX ADMIN — Medication 1 GRAM(S): at 23:01

## 2017-05-29 RX ADMIN — Medication 300 MILLIGRAM(S): at 14:38

## 2017-05-29 RX ADMIN — Medication 1 GRAM(S): at 14:38

## 2017-05-29 RX ADMIN — Medication 800 MILLIGRAM(S): at 09:24

## 2017-05-29 RX ADMIN — PANTOPRAZOLE SODIUM 40 MILLIGRAM(S): 20 TABLET, DELAYED RELEASE ORAL at 05:25

## 2017-05-29 RX ADMIN — Medication 500 MILLIGRAM(S): at 04:02

## 2017-05-29 RX ADMIN — SODIUM CHLORIDE 75 MILLILITER(S): 9 INJECTION INTRAMUSCULAR; INTRAVENOUS; SUBCUTANEOUS at 14:39

## 2017-05-29 RX ADMIN — Medication 12.5 MILLIGRAM(S): at 14:39

## 2017-05-29 RX ADMIN — Medication 1 TABLET(S): at 19:10

## 2017-05-29 RX ADMIN — Medication 0.5 MILLIGRAM(S): at 14:38

## 2017-05-29 RX ADMIN — Medication 800 MILLIGRAM(S): at 20:00

## 2017-05-29 RX ADMIN — DIVALPROEX SODIUM 750 MILLIGRAM(S): 500 TABLET, DELAYED RELEASE ORAL at 05:25

## 2017-05-29 RX ADMIN — Medication 500 MILLIGRAM(S): at 15:30

## 2017-05-29 RX ADMIN — Medication 500 MILLIGRAM(S): at 03:32

## 2017-05-29 RX ADMIN — Medication 500 MILLIGRAM(S): at 14:45

## 2017-05-29 RX ADMIN — Medication 1 TABLET(S): at 05:25

## 2017-05-29 RX ADMIN — SIMVASTATIN 20 MILLIGRAM(S): 20 TABLET, FILM COATED ORAL at 21:26

## 2017-05-29 RX ADMIN — Medication 75 MICROGRAM(S): at 05:25

## 2017-05-29 RX ADMIN — RISPERIDONE 1.5 MILLIGRAM(S): 4 TABLET ORAL at 05:24

## 2017-05-29 RX ADMIN — SODIUM CHLORIDE 100 MILLILITER(S): 9 INJECTION INTRAMUSCULAR; INTRAVENOUS; SUBCUTANEOUS at 07:31

## 2017-05-29 RX ADMIN — Medication 800 MILLIGRAM(S): at 10:00

## 2017-05-29 RX ADMIN — SODIUM CHLORIDE 75 MILLILITER(S): 9 INJECTION INTRAMUSCULAR; INTRAVENOUS; SUBCUTANEOUS at 19:07

## 2017-05-29 RX ADMIN — Medication 1 GRAM(S): at 19:14

## 2017-05-29 RX ADMIN — DIVALPROEX SODIUM 750 MILLIGRAM(S): 500 TABLET, DELAYED RELEASE ORAL at 19:09

## 2017-05-29 NOTE — PROVIDER CONTACT NOTE (OTHER) - BACKGROUND
pt admitted with B cell lymphoma. PMH of BPH, HLD, HTN, Optic nerve disease, Lymphoma, Liver failure, Hypoxia.

## 2017-05-30 LAB
ANION GAP SERPL CALC-SCNC: 14 MMOL/L — SIGNIFICANT CHANGE UP (ref 5–17)
BUN SERPL-MCNC: 10 MG/DL — SIGNIFICANT CHANGE UP (ref 7–23)
CALCIUM SERPL-MCNC: 9.6 MG/DL — SIGNIFICANT CHANGE UP (ref 8.4–10.5)
CHLORIDE SERPL-SCNC: 107 MMOL/L — SIGNIFICANT CHANGE UP (ref 96–108)
CO2 SERPL-SCNC: 22 MMOL/L — SIGNIFICANT CHANGE UP (ref 22–31)
CREAT SERPL-MCNC: 1.17 MG/DL — SIGNIFICANT CHANGE UP (ref 0.5–1.3)
GLUCOSE SERPL-MCNC: 72 MG/DL — SIGNIFICANT CHANGE UP (ref 70–99)
HCT VFR BLD CALC: 44.4 % — SIGNIFICANT CHANGE UP (ref 39–50)
HGB BLD-MCNC: 14.6 G/DL — SIGNIFICANT CHANGE UP (ref 13–17)
MCHC RBC-ENTMCNC: 29.9 PG — SIGNIFICANT CHANGE UP (ref 27–34)
MCHC RBC-ENTMCNC: 32.9 GM/DL — SIGNIFICANT CHANGE UP (ref 32–36)
MCV RBC AUTO: 91 FL — SIGNIFICANT CHANGE UP (ref 80–100)
PLATELET # BLD AUTO: 154 K/UL — SIGNIFICANT CHANGE UP (ref 150–400)
POTASSIUM SERPL-MCNC: 4.9 MMOL/L — SIGNIFICANT CHANGE UP (ref 3.5–5.3)
POTASSIUM SERPL-SCNC: 4.9 MMOL/L — SIGNIFICANT CHANGE UP (ref 3.5–5.3)
RBC # BLD: 4.88 M/UL — SIGNIFICANT CHANGE UP (ref 4.2–5.8)
RBC # FLD: 13.9 % — SIGNIFICANT CHANGE UP (ref 10.3–14.5)
SODIUM SERPL-SCNC: 143 MMOL/L — SIGNIFICANT CHANGE UP (ref 135–145)
WBC # BLD: 4.54 K/UL — SIGNIFICANT CHANGE UP (ref 3.8–10.5)
WBC # FLD AUTO: 4.54 K/UL — SIGNIFICANT CHANGE UP (ref 3.8–10.5)

## 2017-05-30 RX ORDER — ONDANSETRON 8 MG/1
4 TABLET, FILM COATED ORAL ONCE
Qty: 0 | Refills: 0 | Status: COMPLETED | OUTPATIENT
Start: 2017-05-30 | End: 2017-05-30

## 2017-05-30 RX ADMIN — Medication 0.5 MILLIGRAM(S): at 13:25

## 2017-05-30 RX ADMIN — SENNA PLUS 2 TABLET(S): 8.6 TABLET ORAL at 21:10

## 2017-05-30 RX ADMIN — Medication 12.5 MILLIGRAM(S): at 13:25

## 2017-05-30 RX ADMIN — Medication 800 MILLIGRAM(S): at 17:28

## 2017-05-30 RX ADMIN — Medication 1 GRAM(S): at 18:28

## 2017-05-30 RX ADMIN — Medication 300 MILLIGRAM(S): at 13:25

## 2017-05-30 RX ADMIN — RISPERIDONE 1.5 MILLIGRAM(S): 4 TABLET ORAL at 17:28

## 2017-05-30 RX ADMIN — Medication 800 MILLIGRAM(S): at 09:00

## 2017-05-30 RX ADMIN — Medication 75 MICROGRAM(S): at 06:11

## 2017-05-30 RX ADMIN — SIMVASTATIN 20 MILLIGRAM(S): 20 TABLET, FILM COATED ORAL at 21:09

## 2017-05-30 RX ADMIN — ONDANSETRON 4 MILLIGRAM(S): 8 TABLET, FILM COATED ORAL at 21:11

## 2017-05-30 RX ADMIN — PANTOPRAZOLE SODIUM 40 MILLIGRAM(S): 20 TABLET, DELAYED RELEASE ORAL at 06:11

## 2017-05-30 RX ADMIN — Medication 1 GRAM(S): at 06:12

## 2017-05-30 RX ADMIN — DIVALPROEX SODIUM 750 MILLIGRAM(S): 500 TABLET, DELAYED RELEASE ORAL at 17:27

## 2017-05-30 RX ADMIN — Medication 1 TABLET(S): at 06:11

## 2017-05-30 RX ADMIN — Medication 1 TABLET(S): at 17:27

## 2017-05-30 RX ADMIN — Medication 25 MILLIGRAM(S): at 21:10

## 2017-05-30 RX ADMIN — RISPERIDONE 1.5 MILLIGRAM(S): 4 TABLET ORAL at 06:11

## 2017-05-30 RX ADMIN — DIVALPROEX SODIUM 750 MILLIGRAM(S): 500 TABLET, DELAYED RELEASE ORAL at 06:11

## 2017-05-30 RX ADMIN — Medication 800 MILLIGRAM(S): at 08:08

## 2017-05-31 ENCOUNTER — TRANSCRIPTION ENCOUNTER (OUTPATIENT)
Age: 57
End: 2017-05-31

## 2017-05-31 VITALS
OXYGEN SATURATION: 98 % | TEMPERATURE: 98 F | HEART RATE: 80 BPM | DIASTOLIC BLOOD PRESSURE: 71 MMHG | RESPIRATION RATE: 18 BRPM | SYSTOLIC BLOOD PRESSURE: 115 MMHG

## 2017-05-31 PROCEDURE — 85610 PROTHROMBIN TIME: CPT

## 2017-05-31 PROCEDURE — 97116 GAIT TRAINING THERAPY: CPT

## 2017-05-31 PROCEDURE — 81003 URINALYSIS AUTO W/O SCOPE: CPT

## 2017-05-31 PROCEDURE — 82803 BLOOD GASES ANY COMBINATION: CPT

## 2017-05-31 PROCEDURE — 82330 ASSAY OF CALCIUM: CPT

## 2017-05-31 PROCEDURE — 74177 CT ABD & PELVIS W/CONTRAST: CPT

## 2017-05-31 PROCEDURE — 84132 ASSAY OF SERUM POTASSIUM: CPT

## 2017-05-31 PROCEDURE — 83735 ASSAY OF MAGNESIUM: CPT

## 2017-05-31 PROCEDURE — 80053 COMPREHEN METABOLIC PANEL: CPT

## 2017-05-31 PROCEDURE — 82435 ASSAY OF BLOOD CHLORIDE: CPT

## 2017-05-31 PROCEDURE — 99285 EMERGENCY DEPT VISIT HI MDM: CPT | Mod: 25

## 2017-05-31 PROCEDURE — 83605 ASSAY OF LACTIC ACID: CPT

## 2017-05-31 PROCEDURE — 71045 X-RAY EXAM CHEST 1 VIEW: CPT

## 2017-05-31 PROCEDURE — 96375 TX/PRO/DX INJ NEW DRUG ADDON: CPT

## 2017-05-31 PROCEDURE — 85014 HEMATOCRIT: CPT

## 2017-05-31 PROCEDURE — 84295 ASSAY OF SERUM SODIUM: CPT

## 2017-05-31 PROCEDURE — 96374 THER/PROPH/DIAG INJ IV PUSH: CPT

## 2017-05-31 PROCEDURE — 85027 COMPLETE CBC AUTOMATED: CPT

## 2017-05-31 PROCEDURE — 97530 THERAPEUTIC ACTIVITIES: CPT

## 2017-05-31 PROCEDURE — 93005 ELECTROCARDIOGRAM TRACING: CPT

## 2017-05-31 PROCEDURE — 97162 PT EVAL MOD COMPLEX 30 MIN: CPT

## 2017-05-31 PROCEDURE — 85730 THROMBOPLASTIN TIME PARTIAL: CPT

## 2017-05-31 PROCEDURE — 97110 THERAPEUTIC EXERCISES: CPT

## 2017-05-31 PROCEDURE — 82947 ASSAY GLUCOSE BLOOD QUANT: CPT

## 2017-05-31 PROCEDURE — 80048 BASIC METABOLIC PNL TOTAL CA: CPT

## 2017-05-31 RX ORDER — SUCRALFATE 1 G
1 TABLET ORAL
Qty: 120 | Refills: 0 | OUTPATIENT
Start: 2017-05-31 | End: 2017-06-30

## 2017-05-31 RX ORDER — NITROFURANTOIN MACROCRYSTAL 50 MG
1 CAPSULE ORAL
Qty: 0 | Refills: 0 | COMMUNITY

## 2017-05-31 RX ORDER — RISPERIDONE 4 MG/1
1 TABLET ORAL
Qty: 0 | Refills: 0 | COMMUNITY

## 2017-05-31 RX ORDER — DIVALPROEX SODIUM 500 MG/1
3 TABLET, DELAYED RELEASE ORAL
Qty: 0 | Refills: 0 | COMMUNITY
Start: 2017-05-31

## 2017-05-31 RX ORDER — RISPERIDONE 4 MG/1
3 TABLET ORAL
Qty: 0 | Refills: 0 | COMMUNITY
Start: 2017-05-31

## 2017-05-31 RX ORDER — SUCRALFATE 1 G
1 TABLET ORAL
Qty: 0 | Refills: 0 | COMMUNITY
Start: 2017-05-31

## 2017-05-31 RX ADMIN — Medication 12.5 MILLIGRAM(S): at 12:39

## 2017-05-31 RX ADMIN — Medication 1 TABLET(S): at 12:39

## 2017-05-31 RX ADMIN — PANTOPRAZOLE SODIUM 40 MILLIGRAM(S): 20 TABLET, DELAYED RELEASE ORAL at 12:41

## 2017-05-31 RX ADMIN — RISPERIDONE 1.5 MILLIGRAM(S): 4 TABLET ORAL at 12:41

## 2017-05-31 RX ADMIN — Medication 1 GRAM(S): at 12:42

## 2017-05-31 RX ADMIN — DIVALPROEX SODIUM 750 MILLIGRAM(S): 500 TABLET, DELAYED RELEASE ORAL at 12:40

## 2017-05-31 RX ADMIN — Medication 1 GRAM(S): at 00:15

## 2017-05-31 RX ADMIN — Medication 75 MICROGRAM(S): at 12:40

## 2017-05-31 RX ADMIN — Medication 0.5 MILLIGRAM(S): at 12:39

## 2017-05-31 NOTE — DISCHARGE NOTE ADULT - MEDICATION SUMMARY - MEDICATIONS TO STOP TAKING
I will STOP taking the medications listed below when I get home from the hospital:    Macrobid 100 mg oral capsule  -- 1 cap(s) by mouth 2 times a day

## 2017-05-31 NOTE — DISCHARGE NOTE ADULT - PATIENT PORTAL LINK FT
“You can access the Follow[employer] Patient Portal, offered by [facility], by registering with the following website: [email or web address]”

## 2017-05-31 NOTE — DISCHARGE NOTE ADULT - MEDICATION SUMMARY - MEDICATIONS TO TAKE
I will START or STAY ON the medications listed below when I get home from the hospital:    acetaminophen 325 mg oral tablet  -- 2 tab(s) by mouth every 6 hours, As Needed  -- Indication: For pain or fever    Advil 200 mg oral tablet  -- 1 tab(s) by mouth every 8 hours, As Needed  -- Indication: For pain    divalproex sodium 250 mg oral delayed release tablet  -- 3 tab(s) by mouth 2 times a day  -- Indication: For Mood disorder    meclizine 12.5 mg oral tablet  -- 1 tab(s) by mouth once a day  -- Indication: For Dizziness    Antivert 12.5 mg oral tablet  -- 2 tab(s) by mouth once a day (at bedtime)  -- Indication: For Dizziness    Zofran ODT 4 mg oral tablet, disintegrating  -- 1 tab(s) by mouth every 8 hours, As Needed - for nausea  -- Indication: For Nausea/vomiting    simvastatin 20 mg oral tablet  -- 1 tab(s) by mouth once a day (at bedtime)  -- Indication: For High cholesterol    benztropine 0.5 mg oral tablet  -- 1 tab(s) by mouth once a day  -- Indication: For Movement disorder    risperiDONE 0.5 mg oral tablet  -- 3 tab(s) by mouth 2 times a day  -- Indication: For Mood disorder    senna oral tablet  -- 2 tab(s) by mouth once a day (at bedtime), As needed, Constipation  -- Indication: For constipation    docusate sodium 100 mg oral capsule  -- 3 cap(s) by mouth once a day (at bedtime)  -- Indication: For constipation    Metamucil 3.4 g/3.7 g oral powder for reconstitution  -- 30 milliliter(s) by mouth once a day mixed in water  -- Indication: For constipation    magnesium citrate 1.745 g/30 mL oral liquid  -- 300 milliliter(s) by mouth once  -- Indication: For constipation    Fleet Enema 7 g-19 g rectal enema  -- 133 milliliter(s) rectally once a day, As Needed  -- Indication: For constipation    polyethylene glycol 3350 oral powder for reconstitution  -- 17 gram(s) by mouth once a day (at bedtime), As needed, Constipation  -- Indication: For constipation    sucralfate 1 g oral tablet  -- 1 tab(s) by mouth 4 times a day  -- Indication: For prevent ulcer    amoxicillin-clavulanate 875 mg-125 mg oral tablet  -- 1 tab(s) by mouth 2 times a day Last dose to be given on 6/11/17  -- Indication: For Diverticulitis of intestine without perforation or abscess without bleeding    omeprazole 40 mg oral delayed release capsule  -- 1 cap(s) by mouth 2 times a day  -- Indication: For Reflux/Indigestion    Synthroid 75 mcg (0.075 mg) oral tablet  -- 1 tab(s) by mouth once a day  -- Indication: For Hypothyroidism    multivitamin  -- 1 tab(s) by mouth once a day  -- Indication: For supplement    cholecalciferol 50,000 intl units oral capsule  -- 1 cap(s) by mouth once a week on Sunday  -- Indication: For supplement    Vitamin B2 100 mg oral tablet  -- 1 tab(s) by mouth once a day  -- Indication: For supplement

## 2017-05-31 NOTE — DISCHARGE NOTE ADULT - OTHER SIGNIFICANT FINDINGS
57 year old male [facility] p/w complaints of abdominal pain     PMHx: Mental retardation , BPD, hypothyroidism, nephrolithiasis s/p lithotripsy, normal pressure hydrocephalus s/p drainage x2, hypercholesterolemia, Parkinson's disease vs drug-induced movement disorder, GERD    Dx: Acute promixal sigmoid  diverticulitis              (Abx Cipro/Flagyl)   5/25 Patient does not want any Narcotic for his pain since he had a difficult time withdrawing for medication   Ordered Toradol 15 mg IVP x 1     5/26	* IVF started 2/2 diverticulitis  	* Motrin up to 800mg prn for pain  	* ID (Kwabena) following  	* GI (Jody) following  5/29: no plan for egd/colonoscopy per GI at this time- cont abx           dc plan per attending    5/30	* GI (Orion/Jody) plans EGD for myra 2/2 cont with pain rad to left flank  	* NPO post MN for EGD in am  	* PT recc ANTONIO

## 2017-05-31 NOTE — DISCHARGE NOTE ADULT - CARE PROVIDER_API CALL
Pacheco Vera (DO), Gastroenterology; Internal Medicine  [address] [place] [address]  Phone: [phone]  Fax: [phone]

## 2017-05-31 NOTE — DISCHARGE NOTE ADULT - PLAN OF CARE
Inflammation/Infection resolves You are being treated for diverticulitis which is inflamed and or infected diverticulosis.  Eat a low fiber diet during this time to prevent bleeding and/or perforation of the colon wall.  Follow up with GI in 4-6 weeks because your will need an upper colonoscopy and possible an upper endoscopy.  Call for an appointment. Continue your current medications and follow up with your primary healthcare provider in 1-2 weeks or as previously instructed. Continue medication as prescribed.

## 2017-06-13 ENCOUNTER — APPOINTMENT (OUTPATIENT)
Dept: NEUROLOGY | Facility: HOSPITAL | Age: 57
End: 2017-06-13

## 2017-06-25 ENCOUNTER — EMERGENCY (EMERGENCY)
Facility: HOSPITAL | Age: 57
LOS: 1 days | Discharge: HOME | End: 2017-06-25
Attending: EMERGENCY MEDICINE | Admitting: EMERGENCY MEDICINE
Payer: MEDICARE

## 2017-06-25 VITALS
RESPIRATION RATE: 18 BRPM | OXYGEN SATURATION: 98 % | DIASTOLIC BLOOD PRESSURE: 77 MMHG | SYSTOLIC BLOOD PRESSURE: 122 MMHG | TEMPERATURE: 98 F | HEART RATE: 67 BPM

## 2017-06-25 LAB
ALBUMIN SERPL ELPH-MCNC: 4.3 G/DL — SIGNIFICANT CHANGE UP (ref 3.3–5)
ALP SERPL-CCNC: 43 U/L — SIGNIFICANT CHANGE UP (ref 40–120)
ALT FLD-CCNC: 15 U/L RC — SIGNIFICANT CHANGE UP (ref 10–45)
ANION GAP SERPL CALC-SCNC: 11 MMOL/L — SIGNIFICANT CHANGE UP (ref 5–17)
APPEARANCE UR: CLEAR — SIGNIFICANT CHANGE UP
AST SERPL-CCNC: 15 U/L — SIGNIFICANT CHANGE UP (ref 10–40)
BACTERIA # UR AUTO: ABNORMAL /HPF
BASOPHILS # BLD AUTO: 0 K/UL — SIGNIFICANT CHANGE UP (ref 0–0.2)
BASOPHILS NFR BLD AUTO: 0.9 % — SIGNIFICANT CHANGE UP (ref 0–2)
BILIRUB SERPL-MCNC: 0.2 MG/DL — SIGNIFICANT CHANGE UP (ref 0.2–1.2)
BILIRUB UR-MCNC: NEGATIVE — SIGNIFICANT CHANGE UP
BUN SERPL-MCNC: 19 MG/DL — SIGNIFICANT CHANGE UP (ref 7–23)
CALCIUM SERPL-MCNC: 9.6 MG/DL — SIGNIFICANT CHANGE UP (ref 8.4–10.5)
CHLORIDE SERPL-SCNC: 102 MMOL/L — SIGNIFICANT CHANGE UP (ref 96–108)
CO2 SERPL-SCNC: 28 MMOL/L — SIGNIFICANT CHANGE UP (ref 22–31)
COLOR SPEC: YELLOW — SIGNIFICANT CHANGE UP
CREAT SERPL-MCNC: 1.11 MG/DL — SIGNIFICANT CHANGE UP (ref 0.5–1.3)
DIFF PNL FLD: NEGATIVE — SIGNIFICANT CHANGE UP
EOSINOPHIL # BLD AUTO: 0.2 K/UL — SIGNIFICANT CHANGE UP (ref 0–0.5)
EOSINOPHIL NFR BLD AUTO: 4.5 % — SIGNIFICANT CHANGE UP (ref 0–6)
GAS PNL BLDV: SIGNIFICANT CHANGE UP
GLUCOSE SERPL-MCNC: 85 MG/DL — SIGNIFICANT CHANGE UP (ref 70–99)
GLUCOSE UR QL: NEGATIVE — SIGNIFICANT CHANGE UP
HCT VFR BLD CALC: 41.3 % — SIGNIFICANT CHANGE UP (ref 39–50)
HGB BLD-MCNC: 14.9 G/DL — SIGNIFICANT CHANGE UP (ref 13–17)
KETONES UR-MCNC: ABNORMAL
LEUKOCYTE ESTERASE UR-ACNC: NEGATIVE — SIGNIFICANT CHANGE UP
LIDOCAIN IGE QN: 34 U/L — SIGNIFICANT CHANGE UP (ref 7–60)
LYMPHOCYTES # BLD AUTO: 2 K/UL — SIGNIFICANT CHANGE UP (ref 1–3.3)
LYMPHOCYTES # BLD AUTO: 38.5 % — SIGNIFICANT CHANGE UP (ref 13–44)
MCHC RBC-ENTMCNC: 33.4 PG — SIGNIFICANT CHANGE UP (ref 27–34)
MCHC RBC-ENTMCNC: 36.1 GM/DL — HIGH (ref 32–36)
MCV RBC AUTO: 92.6 FL — SIGNIFICANT CHANGE UP (ref 80–100)
MONOCYTES # BLD AUTO: 0.6 K/UL — SIGNIFICANT CHANGE UP (ref 0–0.9)
MONOCYTES NFR BLD AUTO: 12.2 % — SIGNIFICANT CHANGE UP (ref 2–14)
NEUTROPHILS # BLD AUTO: 2.3 K/UL — SIGNIFICANT CHANGE UP (ref 1.8–7.4)
NEUTROPHILS NFR BLD AUTO: 43.9 % — SIGNIFICANT CHANGE UP (ref 43–77)
NITRITE UR-MCNC: NEGATIVE — SIGNIFICANT CHANGE UP
PH UR: 6 — SIGNIFICANT CHANGE UP (ref 5–8)
PLATELET # BLD AUTO: 142 K/UL — LOW (ref 150–400)
POTASSIUM SERPL-MCNC: 4.1 MMOL/L — SIGNIFICANT CHANGE UP (ref 3.5–5.3)
POTASSIUM SERPL-SCNC: 4.1 MMOL/L — SIGNIFICANT CHANGE UP (ref 3.5–5.3)
PROT SERPL-MCNC: 7.6 G/DL — SIGNIFICANT CHANGE UP (ref 6–8.3)
PROT UR-MCNC: 30 MG/DL
RBC # BLD: 4.46 M/UL — SIGNIFICANT CHANGE UP (ref 4.2–5.8)
RBC # FLD: 12.4 % — SIGNIFICANT CHANGE UP (ref 10.3–14.5)
RBC CASTS # UR COMP ASSIST: SIGNIFICANT CHANGE UP /HPF (ref 0–2)
SODIUM SERPL-SCNC: 141 MMOL/L — SIGNIFICANT CHANGE UP (ref 135–145)
SP GR SPEC: >1.03 — HIGH (ref 1.01–1.02)
UROBILINOGEN FLD QL: NEGATIVE — SIGNIFICANT CHANGE UP
WBC # BLD: 5.2 K/UL — SIGNIFICANT CHANGE UP (ref 3.8–10.5)
WBC # FLD AUTO: 5.2 K/UL — SIGNIFICANT CHANGE UP (ref 3.8–10.5)
WBC UR QL: SIGNIFICANT CHANGE UP /HPF (ref 0–5)

## 2017-06-25 PROCEDURE — 74177 CT ABD & PELVIS W/CONTRAST: CPT | Mod: 26

## 2017-06-25 PROCEDURE — 99284 EMERGENCY DEPT VISIT MOD MDM: CPT

## 2017-06-25 RX ORDER — ACETAMINOPHEN 500 MG
1000 TABLET ORAL ONCE
Qty: 0 | Refills: 0 | Status: COMPLETED | OUTPATIENT
Start: 2017-06-25 | End: 2017-06-26

## 2017-06-25 RX ORDER — KETOROLAC TROMETHAMINE 30 MG/ML
15 SYRINGE (ML) INJECTION ONCE
Qty: 0 | Refills: 0 | Status: DISCONTINUED | OUTPATIENT
Start: 2017-06-25 | End: 2017-06-25

## 2017-06-25 RX ORDER — SODIUM CHLORIDE 9 MG/ML
1000 INJECTION INTRAMUSCULAR; INTRAVENOUS; SUBCUTANEOUS ONCE
Qty: 0 | Refills: 0 | Status: COMPLETED | OUTPATIENT
Start: 2017-06-25 | End: 2017-06-25

## 2017-06-25 RX ORDER — ONDANSETRON 8 MG/1
4 TABLET, FILM COATED ORAL ONCE
Qty: 0 | Refills: 0 | Status: COMPLETED | OUTPATIENT
Start: 2017-06-25 | End: 2017-06-25

## 2017-06-25 RX ADMIN — Medication 15 MILLIGRAM(S): at 18:13

## 2017-06-25 RX ADMIN — ONDANSETRON 4 MILLIGRAM(S): 8 TABLET, FILM COATED ORAL at 18:13

## 2017-06-25 RX ADMIN — Medication 15 MILLIGRAM(S): at 18:14

## 2017-06-25 RX ADMIN — SODIUM CHLORIDE 1000 MILLILITER(S): 9 INJECTION INTRAMUSCULAR; INTRAVENOUS; SUBCUTANEOUS at 18:02

## 2017-06-25 NOTE — ED PROVIDER NOTE - PROGRESS NOTE DETAILS
Pts [facility] contacted nurse supervisor Pallavi regarding plan to back to NH -- Devin

## 2017-06-25 NOTE — ED PROVIDER NOTE - OBJECTIVE STATEMENT
57 year old male presents c/o nausea, diarrhea x 2 days. Patient states that yesterday afternoon he started to have 10/10 lower crampy abdominal pain associated with nausea and several episodes of watery diarrhea. Patient states that he vomited 1x this morning. He denies fevers, chills, chest pain, sob, headache, dizziness.  Patient was admitted in the hospital 5/24-5/31w/ the diagnosis of diverticulitis. Patient states he had similar symptoms on last presentation. He states he has a h/o hemorrhoids w/ occasional bright red blood when wiping after BM. Denies other rectal bleeding. Patient PMD Dr. Rubi Vasquez. 57 year old male presents c/o nausea, diarrhea x 2 days. Patient states that yesterday afternoon he started to have 10/10 lower crampy abdominal pain associated with nausea and several episodes of watery diarrhea. Patient states that he vomited 1x this morning. Patient reports urinary incontinence at night w/ daytime continence. He denies fevers, chills, chest pain, sob, headache, dizziness.  Patient was admitted in the hospital 5/24-5/31w/ the diagnosis of diverticulitis. Patient states he had similar symptoms on last presentation. He states he has a h/o hemorrhoids w/ occasional bright red blood when wiping after BM. Denies other rectal bleeding. Patient PMD Dr. Rubi Vasquez. 57 year old male presents c/o nausea, diarrhea x 2 days. Patient states that yesterday afternoon he started to have 10/10 lower crampy abdominal pain that radiates to his right flank associated with nausea and several episodes of watery diarrhea. Patient states that he vomited 1x this morning. Patient reports urinary incontinence at night w/ daytime continence w/ some dysuria over the past 2 days. He denies fevers, chills, chest pain, sob, headache, dizziness.  Patient was admitted in the hospital 5/24-5/31w/ the diagnosis of diverticulitis. Patient states he had similar symptoms on last presentation. He states he has a h/o hemorrhoids w/ occasional bright red blood when wiping after BM. Denies other rectal bleeding. Patient w/ h/o nephrolithiasis s/p removal of 2 para-thyroid glands. Patient PMD Dr. Rubi Vasquez.

## 2017-06-25 NOTE — ED PROVIDER NOTE - MEDICAL DECISION MAKING DETAILS
Pt from a NH with abdominal discomfort no vomiting had 2 loose stools no Pt from a NH with abdominal discomfort no vomiting had 2 loose stools no blood in stools abdomen soft discomfort on palpation no masses BS+ CT scan abdomen revealed uncomplicated diverticulitis of ascending colon d/c back to NH for continued oral antibiotics and follow up-- Beltran

## 2017-06-25 NOTE — ED ADULT NURSE REASSESSMENT NOTE - NS ED NURSE REASSESS COMMENT FT1
2048 Pt straight cath for urine unable ot given a sterile specimen.  UA and CX sent Pt pending CT scan Gennaorn

## 2017-06-25 NOTE — ED PROVIDER NOTE - CARE PLAN
Principal Discharge DX:	Hematuria Principal Discharge DX:	Abdominal pain Principal Discharge DX:	Diverticulitis large intestine w/o perforation or abscess w/o bleeding

## 2017-06-25 NOTE — ED PROVIDER NOTE - PMH
Bipolar Disorder    Hydronephrosis with Renal and Ureteral Calculous Obstruction  at last admission in [facility] ([place]) 2010  Hypothyroidism    MR (mental retardation)    Nephrolithiasis

## 2017-06-25 NOTE — ED PROVIDER NOTE - ATTENDING CONTRIBUTION TO CARE
I have seen and evaluated this patient with the advance practice clinician.   I agree with the findings  unless other wise stated. I have amended notes where needed.  After my face to face bedside evaluation, I am noting: Pt from a NH with abdominal discomfort no vomiting had 2 loose stools no blood in stools abdomen soft discomfort on palpation no masses BS+ CT scan abdomen revealed uncomplicated diverticulitis of ascending colon d/c back to NH for continued oral antibiotics and follow up-- Beltran

## 2017-06-25 NOTE — ED ADULT NURSE NOTE - PMH
Bipolar Disorder    Hydronephrosis with Renal and Ureteral Calculous Obstruction  at last admission in [facility] ([place]) 2010  Hypothyroidism    MR (mental retardation)    Nephrolithiasis

## 2017-06-25 NOTE — ED ADULT NURSE NOTE - OBJECTIVE STATEMENT
Pt comes form a Nursing Home for abd pain and loose stools 2 times today.  Pt has hx of Diverticulitis and was admitted 1 month ago for the same c/o.  IVL placed and meds and fluids and oral contrast given as ordered Atilio

## 2017-06-25 NOTE — ED PROVIDER NOTE - GASTROINTESTINAL, MLM
+ Bowel sounds, abdomen soft, pt w/ mild generalized abdominal tenderness, no guarding. + Bowel sounds, abdomen soft, pt w/ mild generalized abdominal tenderness, no guarding. + right sided CVA tenderness + Bowel sounds, abdomen soft, pt w/ mild generalized abdominal tenderness, no guarding. + right sided CVA tenderness Rectal area erythema and a skin tag at verge no bleeding

## 2017-06-26 VITALS
OXYGEN SATURATION: 95 % | TEMPERATURE: 98 F | DIASTOLIC BLOOD PRESSURE: 67 MMHG | SYSTOLIC BLOOD PRESSURE: 112 MMHG | RESPIRATION RATE: 18 BRPM | HEART RATE: 63 BPM

## 2017-06-26 LAB
CULTURE RESULTS: NO GROWTH — SIGNIFICANT CHANGE UP
SPECIMEN SOURCE: SIGNIFICANT CHANGE UP

## 2017-06-26 PROCEDURE — 80053 COMPREHEN METABOLIC PANEL: CPT

## 2017-06-26 PROCEDURE — 81001 URINALYSIS AUTO W/SCOPE: CPT

## 2017-06-26 PROCEDURE — 96374 THER/PROPH/DIAG INJ IV PUSH: CPT | Mod: XU

## 2017-06-26 PROCEDURE — 74177 CT ABD & PELVIS W/CONTRAST: CPT

## 2017-06-26 PROCEDURE — 85027 COMPLETE CBC AUTOMATED: CPT

## 2017-06-26 PROCEDURE — 82435 ASSAY OF BLOOD CHLORIDE: CPT

## 2017-06-26 PROCEDURE — 96375 TX/PRO/DX INJ NEW DRUG ADDON: CPT | Mod: XU

## 2017-06-26 PROCEDURE — 99284 EMERGENCY DEPT VISIT MOD MDM: CPT | Mod: 25

## 2017-06-26 PROCEDURE — 83690 ASSAY OF LIPASE: CPT

## 2017-06-26 PROCEDURE — 84295 ASSAY OF SERUM SODIUM: CPT

## 2017-06-26 PROCEDURE — 82330 ASSAY OF CALCIUM: CPT

## 2017-06-26 PROCEDURE — 87086 URINE CULTURE/COLONY COUNT: CPT

## 2017-06-26 PROCEDURE — 83605 ASSAY OF LACTIC ACID: CPT

## 2017-06-26 PROCEDURE — 85014 HEMATOCRIT: CPT

## 2017-06-26 PROCEDURE — 82947 ASSAY GLUCOSE BLOOD QUANT: CPT

## 2017-06-26 PROCEDURE — 82803 BLOOD GASES ANY COMBINATION: CPT

## 2017-06-26 PROCEDURE — 96376 TX/PRO/DX INJ SAME DRUG ADON: CPT | Mod: XU

## 2017-06-26 PROCEDURE — 51701 INSERT BLADDER CATHETER: CPT

## 2017-06-26 PROCEDURE — 84132 ASSAY OF SERUM POTASSIUM: CPT

## 2017-06-26 RX ORDER — ONDANSETRON 8 MG/1
4 TABLET, FILM COATED ORAL ONCE
Qty: 0 | Refills: 0 | Status: COMPLETED | OUTPATIENT
Start: 2017-06-26 | End: 2017-06-26

## 2017-06-26 RX ADMIN — Medication 400 MILLIGRAM(S): at 04:01

## 2017-06-26 RX ADMIN — ONDANSETRON 4 MILLIGRAM(S): 8 TABLET, FILM COATED ORAL at 03:59

## 2017-06-26 NOTE — ED ADULT NURSE REASSESSMENT NOTE - NS ED NURSE REASSESS COMMENT FT1
pt resting comfortably in stretcher, ct scan negative. pending transport back to facility. VS stable. safety and fall precautions maintained.

## 2017-06-26 NOTE — ED ADULT NURSE REASSESSMENT NOTE - NS ED NURSE REASSESS COMMENT FT1
0320 am patient still waiting for ambulance, c/o abdominal pain and nausea, IV tylenol and Zofran as ordered given.

## 2017-07-01 ENCOUNTER — INPATIENT (INPATIENT)
Facility: HOSPITAL | Age: 57
LOS: 8 days | Discharge: NURSING, REHAB OR LONG-TERM CARE | DRG: 872 | End: 2017-07-10
Attending: INTERNAL MEDICINE | Admitting: INTERNAL MEDICINE
Payer: MEDICARE

## 2017-07-01 VITALS
HEART RATE: 108 BPM | TEMPERATURE: 102 F | OXYGEN SATURATION: 97 % | RESPIRATION RATE: 20 BRPM | SYSTOLIC BLOOD PRESSURE: 115 MMHG | DIASTOLIC BLOOD PRESSURE: 69 MMHG

## 2017-07-01 DIAGNOSIS — J18.1 LOBAR PNEUMONIA, UNSPECIFIED ORGANISM: ICD-10-CM

## 2017-07-01 LAB
ALBUMIN SERPL ELPH-MCNC: 4.1 G/DL — SIGNIFICANT CHANGE UP (ref 3.3–5)
ALP SERPL-CCNC: 42 U/L — SIGNIFICANT CHANGE UP (ref 40–120)
ALT FLD-CCNC: 11 U/L RC — SIGNIFICANT CHANGE UP (ref 10–45)
ANION GAP SERPL CALC-SCNC: 16 MMOL/L — SIGNIFICANT CHANGE UP (ref 5–17)
APPEARANCE UR: CLEAR — SIGNIFICANT CHANGE UP
APTT BLD: 29.9 SEC — SIGNIFICANT CHANGE UP (ref 27.5–37.4)
AST SERPL-CCNC: 10 U/L — SIGNIFICANT CHANGE UP (ref 10–40)
BACTERIA # UR AUTO: NEGATIVE /HPF — SIGNIFICANT CHANGE UP
BASOPHILS # BLD AUTO: 0 K/UL — SIGNIFICANT CHANGE UP (ref 0–0.2)
BASOPHILS NFR BLD AUTO: 0.3 % — SIGNIFICANT CHANGE UP (ref 0–2)
BILIRUB SERPL-MCNC: 0.6 MG/DL — SIGNIFICANT CHANGE UP (ref 0.2–1.2)
BILIRUB UR-MCNC: NEGATIVE — SIGNIFICANT CHANGE UP
BUN SERPL-MCNC: 15 MG/DL — SIGNIFICANT CHANGE UP (ref 7–23)
CALCIUM SERPL-MCNC: 9.7 MG/DL — SIGNIFICANT CHANGE UP (ref 8.4–10.5)
CHLORIDE SERPL-SCNC: 100 MMOL/L — SIGNIFICANT CHANGE UP (ref 96–108)
CO2 SERPL-SCNC: 23 MMOL/L — SIGNIFICANT CHANGE UP (ref 22–31)
COLOR SPEC: YELLOW — SIGNIFICANT CHANGE UP
CREAT SERPL-MCNC: 1.19 MG/DL — SIGNIFICANT CHANGE UP (ref 0.5–1.3)
DIFF PNL FLD: NEGATIVE — SIGNIFICANT CHANGE UP
EOSINOPHIL # BLD AUTO: 0.1 K/UL — SIGNIFICANT CHANGE UP (ref 0–0.5)
EOSINOPHIL NFR BLD AUTO: 0.5 % — SIGNIFICANT CHANGE UP (ref 0–6)
EPI CELLS # UR: NEGATIVE /HPF — SIGNIFICANT CHANGE UP
GAS PNL BLDV: SIGNIFICANT CHANGE UP
GLUCOSE SERPL-MCNC: 109 MG/DL — HIGH (ref 70–99)
GLUCOSE UR QL: NEGATIVE — SIGNIFICANT CHANGE UP
HCT VFR BLD CALC: 42.4 % — SIGNIFICANT CHANGE UP (ref 39–50)
HGB BLD-MCNC: 14.7 G/DL — SIGNIFICANT CHANGE UP (ref 13–17)
INR BLD: 1.15 RATIO — SIGNIFICANT CHANGE UP (ref 0.88–1.16)
KETONES UR-MCNC: ABNORMAL
LEUKOCYTE ESTERASE UR-ACNC: ABNORMAL
LYMPHOCYTES # BLD AUTO: 1.4 K/UL — SIGNIFICANT CHANGE UP (ref 1–3.3)
LYMPHOCYTES # BLD AUTO: 10.3 % — LOW (ref 13–44)
MCHC RBC-ENTMCNC: 32.4 PG — SIGNIFICANT CHANGE UP (ref 27–34)
MCHC RBC-ENTMCNC: 34.7 GM/DL — SIGNIFICANT CHANGE UP (ref 32–36)
MCV RBC AUTO: 93.4 FL — SIGNIFICANT CHANGE UP (ref 80–100)
MONOCYTES # BLD AUTO: 2 K/UL — HIGH (ref 0–0.9)
MONOCYTES NFR BLD AUTO: 14.3 % — HIGH (ref 2–14)
NEUTROPHILS # BLD AUTO: 10.5 K/UL — HIGH (ref 1.8–7.4)
NEUTROPHILS NFR BLD AUTO: 74.6 % — SIGNIFICANT CHANGE UP (ref 43–77)
NITRITE UR-MCNC: NEGATIVE — SIGNIFICANT CHANGE UP
PH UR: 8 — SIGNIFICANT CHANGE UP (ref 5–8)
PLATELET # BLD AUTO: 148 K/UL — LOW (ref 150–400)
POTASSIUM SERPL-MCNC: 4.1 MMOL/L — SIGNIFICANT CHANGE UP (ref 3.5–5.3)
POTASSIUM SERPL-SCNC: 4.1 MMOL/L — SIGNIFICANT CHANGE UP (ref 3.5–5.3)
PROT SERPL-MCNC: 7.6 G/DL — SIGNIFICANT CHANGE UP (ref 6–8.3)
PROT UR-MCNC: 30 MG/DL
PROTHROM AB SERPL-ACNC: 12.6 SEC — SIGNIFICANT CHANGE UP (ref 9.8–12.7)
RAPID RVP RESULT: SIGNIFICANT CHANGE UP
RBC # BLD: 4.55 M/UL — SIGNIFICANT CHANGE UP (ref 4.2–5.8)
RBC # FLD: 12.8 % — SIGNIFICANT CHANGE UP (ref 10.3–14.5)
RBC CASTS # UR COMP ASSIST: SIGNIFICANT CHANGE UP /HPF (ref 0–2)
SODIUM SERPL-SCNC: 139 MMOL/L — SIGNIFICANT CHANGE UP (ref 135–145)
SP GR SPEC: 1.02 — SIGNIFICANT CHANGE UP (ref 1.01–1.02)
UROBILINOGEN FLD QL: 1
WBC # BLD: 14.1 K/UL — HIGH (ref 3.8–10.5)
WBC # FLD AUTO: 14.1 K/UL — HIGH (ref 3.8–10.5)
WBC UR QL: SIGNIFICANT CHANGE UP /HPF (ref 0–5)

## 2017-07-01 PROCEDURE — 93010 ELECTROCARDIOGRAM REPORT: CPT

## 2017-07-01 PROCEDURE — 71010: CPT | Mod: 26

## 2017-07-01 PROCEDURE — 71250 CT THORAX DX C-: CPT | Mod: 26

## 2017-07-01 PROCEDURE — 99285 EMERGENCY DEPT VISIT HI MDM: CPT | Mod: 25,GC

## 2017-07-01 RX ORDER — KETOROLAC TROMETHAMINE 30 MG/ML
15 SYRINGE (ML) INJECTION ONCE
Qty: 0 | Refills: 0 | Status: DISCONTINUED | OUTPATIENT
Start: 2017-07-01 | End: 2017-07-01

## 2017-07-01 RX ORDER — PANTOPRAZOLE SODIUM 20 MG/1
40 TABLET, DELAYED RELEASE ORAL
Qty: 0 | Refills: 0 | Status: DISCONTINUED | OUTPATIENT
Start: 2017-07-01 | End: 2017-07-10

## 2017-07-01 RX ORDER — ENOXAPARIN SODIUM 100 MG/ML
40 INJECTION SUBCUTANEOUS DAILY
Qty: 0 | Refills: 0 | Status: DISCONTINUED | OUTPATIENT
Start: 2017-07-01 | End: 2017-07-10

## 2017-07-01 RX ORDER — ACETAMINOPHEN 500 MG
650 TABLET ORAL EVERY 6 HOURS
Qty: 0 | Refills: 0 | Status: DISCONTINUED | OUTPATIENT
Start: 2017-07-01 | End: 2017-07-10

## 2017-07-01 RX ORDER — SIMVASTATIN 20 MG/1
20 TABLET, FILM COATED ORAL AT BEDTIME
Qty: 0 | Refills: 0 | Status: DISCONTINUED | OUTPATIENT
Start: 2017-07-01 | End: 2017-07-10

## 2017-07-01 RX ORDER — SENNA PLUS 8.6 MG/1
2 TABLET ORAL AT BEDTIME
Qty: 0 | Refills: 0 | Status: DISCONTINUED | OUTPATIENT
Start: 2017-07-01 | End: 2017-07-10

## 2017-07-01 RX ORDER — RISPERIDONE 4 MG/1
0.25 TABLET ORAL
Qty: 0 | Refills: 0 | Status: DISCONTINUED | OUTPATIENT
Start: 2017-07-01 | End: 2017-07-06

## 2017-07-01 RX ORDER — DIVALPROEX SODIUM 500 MG/1
250 TABLET, DELAYED RELEASE ORAL
Qty: 0 | Refills: 0 | Status: DISCONTINUED | OUTPATIENT
Start: 2017-07-01 | End: 2017-07-04

## 2017-07-01 RX ORDER — PIPERACILLIN AND TAZOBACTAM 4; .5 G/20ML; G/20ML
3.38 INJECTION, POWDER, LYOPHILIZED, FOR SOLUTION INTRAVENOUS EVERY 8 HOURS
Qty: 0 | Refills: 0 | Status: DISCONTINUED | OUTPATIENT
Start: 2017-07-01 | End: 2017-07-06

## 2017-07-01 RX ORDER — BENZTROPINE MESYLATE 1 MG
0.5 TABLET ORAL DAILY
Qty: 0 | Refills: 0 | Status: DISCONTINUED | OUTPATIENT
Start: 2017-07-01 | End: 2017-07-10

## 2017-07-01 RX ORDER — ALBUTEROL 90 UG/1
1 AEROSOL, METERED ORAL EVERY 4 HOURS
Qty: 0 | Refills: 0 | Status: DISCONTINUED | OUTPATIENT
Start: 2017-07-01 | End: 2017-07-10

## 2017-07-01 RX ORDER — PIPERACILLIN AND TAZOBACTAM 4; .5 G/20ML; G/20ML
3.38 INJECTION, POWDER, LYOPHILIZED, FOR SOLUTION INTRAVENOUS ONCE
Qty: 0 | Refills: 0 | Status: COMPLETED | OUTPATIENT
Start: 2017-07-01 | End: 2017-07-01

## 2017-07-01 RX ORDER — LEVOTHYROXINE SODIUM 125 MCG
75 TABLET ORAL DAILY
Qty: 0 | Refills: 0 | Status: DISCONTINUED | OUTPATIENT
Start: 2017-07-01 | End: 2017-07-10

## 2017-07-01 RX ORDER — TIOTROPIUM BROMIDE 18 UG/1
1 CAPSULE ORAL; RESPIRATORY (INHALATION) DAILY
Qty: 0 | Refills: 0 | Status: DISCONTINUED | OUTPATIENT
Start: 2017-07-01 | End: 2017-07-10

## 2017-07-01 RX ORDER — IPRATROPIUM/ALBUTEROL SULFATE 18-103MCG
3 AEROSOL WITH ADAPTER (GRAM) INHALATION EVERY 6 HOURS
Qty: 0 | Refills: 0 | Status: DISCONTINUED | OUTPATIENT
Start: 2017-07-01 | End: 2017-07-10

## 2017-07-01 RX ORDER — AZITHROMYCIN 500 MG/1
500 TABLET, FILM COATED ORAL ONCE
Qty: 0 | Refills: 0 | Status: COMPLETED | OUTPATIENT
Start: 2017-07-01 | End: 2017-07-01

## 2017-07-01 RX ORDER — SODIUM CHLORIDE 9 MG/ML
500 INJECTION INTRAMUSCULAR; INTRAVENOUS; SUBCUTANEOUS
Qty: 0 | Refills: 0 | Status: COMPLETED | OUTPATIENT
Start: 2017-07-01 | End: 2017-07-01

## 2017-07-01 RX ORDER — SODIUM CHLORIDE 9 MG/ML
1000 INJECTION INTRAMUSCULAR; INTRAVENOUS; SUBCUTANEOUS
Qty: 0 | Refills: 0 | Status: DISCONTINUED | OUTPATIENT
Start: 2017-07-01 | End: 2017-07-05

## 2017-07-01 RX ORDER — SODIUM CHLORIDE 9 MG/ML
3 INJECTION INTRAMUSCULAR; INTRAVENOUS; SUBCUTANEOUS ONCE
Qty: 0 | Refills: 0 | Status: COMPLETED | OUTPATIENT
Start: 2017-07-01 | End: 2017-07-01

## 2017-07-01 RX ORDER — VANCOMYCIN HCL 1 G
1000 VIAL (EA) INTRAVENOUS EVERY 12 HOURS
Qty: 0 | Refills: 0 | Status: DISCONTINUED | OUTPATIENT
Start: 2017-07-01 | End: 2017-07-02

## 2017-07-01 RX ADMIN — Medication 3 MILLILITER(S): at 17:35

## 2017-07-01 RX ADMIN — SODIUM CHLORIDE 2000 MILLILITER(S): 9 INJECTION INTRAMUSCULAR; INTRAVENOUS; SUBCUTANEOUS at 13:30

## 2017-07-01 RX ADMIN — AZITHROMYCIN 250 MILLIGRAM(S): 500 TABLET, FILM COATED ORAL at 13:54

## 2017-07-01 RX ADMIN — DIVALPROEX SODIUM 250 MILLIGRAM(S): 500 TABLET, DELAYED RELEASE ORAL at 18:59

## 2017-07-01 RX ADMIN — PIPERACILLIN AND TAZOBACTAM 25 GRAM(S): 4; .5 INJECTION, POWDER, LYOPHILIZED, FOR SOLUTION INTRAVENOUS at 21:31

## 2017-07-01 RX ADMIN — Medication 15 MILLIGRAM(S): at 14:37

## 2017-07-01 RX ADMIN — SODIUM CHLORIDE 50 MILLILITER(S): 9 INJECTION INTRAMUSCULAR; INTRAVENOUS; SUBCUTANEOUS at 18:57

## 2017-07-01 RX ADMIN — SODIUM CHLORIDE 2000 MILLILITER(S): 9 INJECTION INTRAMUSCULAR; INTRAVENOUS; SUBCUTANEOUS at 12:45

## 2017-07-01 RX ADMIN — Medication 650 MILLIGRAM(S): at 17:36

## 2017-07-01 RX ADMIN — SIMVASTATIN 20 MILLIGRAM(S): 20 TABLET, FILM COATED ORAL at 21:32

## 2017-07-01 RX ADMIN — SODIUM CHLORIDE 2000 MILLILITER(S): 9 INJECTION INTRAMUSCULAR; INTRAVENOUS; SUBCUTANEOUS at 12:25

## 2017-07-01 RX ADMIN — SODIUM CHLORIDE 3 MILLILITER(S): 9 INJECTION INTRAMUSCULAR; INTRAVENOUS; SUBCUTANEOUS at 12:43

## 2017-07-01 RX ADMIN — SODIUM CHLORIDE 2000 MILLILITER(S): 9 INJECTION INTRAMUSCULAR; INTRAVENOUS; SUBCUTANEOUS at 13:54

## 2017-07-01 RX ADMIN — PIPERACILLIN AND TAZOBACTAM 200 GRAM(S): 4; .5 INJECTION, POWDER, LYOPHILIZED, FOR SOLUTION INTRAVENOUS at 12:46

## 2017-07-01 RX ADMIN — Medication 15 MILLIGRAM(S): at 12:45

## 2017-07-01 RX ADMIN — Medication 3 MILLILITER(S): at 23:30

## 2017-07-01 RX ADMIN — Medication 250 MILLIGRAM(S): at 18:57

## 2017-07-01 RX ADMIN — SODIUM CHLORIDE 2000 MILLILITER(S): 9 INJECTION INTRAMUSCULAR; INTRAVENOUS; SUBCUTANEOUS at 12:48

## 2017-07-01 RX ADMIN — RISPERIDONE 0.25 MILLIGRAM(S): 4 TABLET ORAL at 18:59

## 2017-07-01 NOTE — PATIENT PROFILE ADULT. - VISION (WITH CORRECTIVE LENSES IF THE PATIENT USUALLY WEARS THEM):
Normal vision: sees adequately in most situations; can see medication labels, newsprint/Reading glasses

## 2017-07-01 NOTE — H&P ADULT - NSHPLABSRESULTS_GEN_ALL_CORE
LABS:                        14.7   14.1  )-----------( 148      ( 2017 12:41 )             42.4     07-    139  |  100  |  15  ----------------------------<  109<H>  4.1   |  23  |  1.19    Ca    9.7      2017 12:41    TPro  7.6  /  Alb  4.1  /  TBili  0.6  /  DBili  x   /  AST  10  /  ALT  11  /  AlkPhos  42  07-01    PT/INR - ( 2017 12:41 )   PT: 12.6 sec;   INR: 1.15 ratio         PTT - ( 2017 12:41 )  PTT:29.9 sec  Urinalysis Basic - ( 2017 12:41 )    Color: Yellow / Appearance: Clear / S.020 / pH: x  Gluc: x / Ketone: Trace  / Bili: Negative / Urobili: 1   Blood: x / Protein: 30 mg/dL / Nitrite: Negative   Leuk Esterase: Small / RBC: 0-2 /HPF / WBC 11-25 /HPF   Sq Epi: x / Non Sq Epi: Negative /HPF / Bacteria: Negative /HPF          RADIOLOGY & ADDITIONAL TESTS:

## 2017-07-01 NOTE — ED ADULT NURSE NOTE - OBJECTIVE STATEMENT
56 yo M pre 58 yo M presents to ED via EMS from [facility] for fever. Pt is A+Ox2. As per EMS, pt. was noted to have rectal temperature of 103.4F at living facility, as per EMS was give "two tylenol" and IV fluid at facility. Arrives with rectal temperature of 101.9F, and heart rate in 110s, MD Childs aware, medication and IVF being given as ordered. Pt. reports chest pain, upper abdominal pain and SOB. Pt. arrives with oxygen saturation in low 90s on RA, breathing tachypneic. Pt. placed on 3L NC, oxygen saturation improved to high 90s. Pt. placed on CM, EKG completed and given to MD. Lung sounds clear. Skin warm pink dry and intact. Abdomen soft, nondistended, nontender. No edema noted to lower extremities. Comfort and safety measures in place. Will continue to monitor. 58 yo M presents to ED via EMS from [facility] for fever. Pt is A+Ox2. As per EMS, pt. was noted to have rectal temperature of 103.4F at living facility, as per EMS was give "two tylenol" and IV fluid at facility. Arrives with rectal temperature of 101.9F, and heart rate in 110s, MD Childs aware, medication and IVF being given as ordered. Pt. reports chest pain, upper abdominal pain and SOB. Pt. arrives with oxygen saturation in low 90s on RA, breathing tachypneic. Pt. placed on 3L NC, oxygen saturation improved to high 90s. Pt. placed on CM, EKG completed and given to MD. Lung sounds clear. Skin warm pink dry and intact. Abdomen soft, nondistended, nontender. No edema noted to lower extremities. Comfort and safety measures in place. Will continue to monitor. Straight catheterization done under sterile technique as per MD order with 2 RNs present, pt. tolerated well, moderate amount of clear isa colored urine drained.

## 2017-07-01 NOTE — ED PROVIDER NOTE - PMH
Bipolar Disorder    Hydronephrosis with Renal and Ureteral Calculous Obstruction  at last admission in [facility] ([place]) 2010  Hypothyroidism    MR (mental retardation)    Nephrolithiasis

## 2017-07-01 NOTE — H&P ADULT - HISTORY OF PRESENT ILLNESS
: 57 pt   BIBEMS for weakness and feve,  from  nursing  home, r H/O Mental retardation , BPD, hypothyroidism, normal pressure hydrocephalus s/p drainage x2, Parkinson's disease vs drug-induced movement disorder, generalized weakness x2 days, fever since last night, +cough productive,  no   no nausea/vomiting/diarrhea. no rash.   , on  8  britany, now,  not  sob,  has  chills PCP- Rubi Vasquez

## 2017-07-01 NOTE — ED PROVIDER NOTE - OBJECTIVE STATEMENT
58yo M BIBEMS for weakness and fever H/O Mental retardation , BPD, hypothyroidism, normal pressure hydrocephalus s/p drainage x2, Parkinson's disease vs drug-induced movement disorder, generalized weakness x2 days, fever since last night, +cough productive, +SOB +midsternal CP, +upper abd pain no nausea/vomiting/diarrhea. no rash. does not ambulate. no PADRON.     PCP- Rubi Vasquez

## 2017-07-01 NOTE — H&P ADULT - NSHPREVIEWOFSYSTEMS_GEN_ALL_CORE
REVIEW OF SYSTEMS:    CONSTITUTIONAL:  weakness  EYES/ENT: No visual changes;  No vertigo or throat pain   NECK: No pain or stiffness  RESPIRATORY: No cough, wheezing, hemoptysis;  had shortness of breath  CARDIOVASCULAR: No chest pain or palpitations  GASTROINTESTINAL: No abdominal or epigastric pain. No nausea, vomiting, or hematemesis; No diarrhea or constipation. No melena or hematochezia.  GENITOURINARY: No dysuria, frequency or hematuria  NEUROLOGICAL: No numbness or weakness  SKIN: No itching, rashes

## 2017-07-01 NOTE — H&P ADULT - NSHPPHYSICALEXAM_GEN_ALL_CORE
PHYSICAL EXAMINATION:  Vital Signs Last 24 Hrs  T(C): 37 (01 Jul 2017 16:02), Max: 38.8 (01 Jul 2017 12:24)  T(F): 98.6 (01 Jul 2017 16:02), Max: 101.9 (01 Jul 2017 12:24)  HR: 86 (01 Jul 2017 16:02) (83 - 108)  BP: 116/77 (01 Jul 2017 16:02) (111/75 - 116/77)  BP(mean): --  RR: 18 (01 Jul 2017 16:02) (18 - 20)  SpO2: 100% (01 Jul 2017 16:02) (97% - 100%)  CAPILLARY BLOOD GLUCOSE            GENERAL: NAD, well-groomed, well-developed  HEAD:  atraumatic, normocephalic  EYES: sclera anicteric  ENMT: mucous membranes moist  NECK: supple, No JVD  CHEST/LUNG:   rhonchi, or wheezing b/l  HEART: normal S1, S2  ABDOMEN: BS+, soft, ND, NT   EXTREMITIES:  pulses palpable; no clubbing, cyanosis, or edema b/l LEs  NEURO: awake, alert, interactive;  barely  raises  b.l  legs,  can raise  arms  SKIN: no rashes or lesions

## 2017-07-01 NOTE — H&P ADULT - ASSESSMENT
pt  with  fevers/ chills,  pna, ,   hcap , on  zosyn/  vanco,  mild  dementia,  ct  chest,.  sepsis  from pna,  high  wbc,    chills

## 2017-07-01 NOTE — ED PROVIDER NOTE - ATTENDING CONTRIBUTION TO CARE
poor historian, cough, fever, on my exam:  awake, alert, cooperative, unable to ambulate independently

## 2017-07-01 NOTE — ED PROVIDER NOTE - MEDICAL DECISION MAKING DETAILS
sepsis, likely from PNA, will pan culture, CXR, UA, Zosyn and Azithro for now. fluids. TBA. EKG and trop for c/p low suspicion ACS

## 2017-07-01 NOTE — ED PROVIDER NOTE - NS ED ROS FT
ROS: +CP/SOB. +cough. no n/v/d/c. +abd pain. no rash. no bleeding. no urinary complaints. no weakness. no vision changes. no HA. no neck/back pain. no extremity swelling.

## 2017-07-01 NOTE — ED PROVIDER NOTE - PHYSICAL EXAMINATION
Gen: NAD, AOx2  Head: NCAT  HEENT: PERRL, oral mucosa moist, normal conjunctiva, neck supple  Lung: mild tachypnea, decreased BS rt base  CV: tachy, no murmur, Normal perfusion  Abd: soft, +epigastric ttp, ND, no CVA tenderness  MSK: No edema, no visible deformities  Neuro: 2/5 global strength  Skin: No rash, febrile

## 2017-07-01 NOTE — ED ADULT NURSE REASSESSMENT NOTE - NS ED NURSE REASSESS COMMENT FT1
Pt. appears comfortable, breathing unlabored on 3L NC. Comfort and safety maintained. Admitted to medicine, awaiting bed.

## 2017-07-01 NOTE — PROVIDER CONTACT NOTE (OTHER) - SITUATION
Pt VSS upon admission to floor. Approx 1.5 hrs later pt with rigors, rectal temp 100.5 and some SOB/tachypnea.

## 2017-07-01 NOTE — PATIENT PROFILE ADULT. - NS TRANSFER PATIENT BELONGINGS
1 chain gold color/Jewelry/Money (specify)/Wrist Watch/Cell Phone/PDA (specify) Cell Phone/PDA (specify)/Jewelry/Money (specify)

## 2017-07-01 NOTE — H&P ADULT - PMH
Bipolar Disorder    Hydronephrosis with Renal and Ureteral Calculous Obstruction  at last admission in [facility] ([place]) 2010  Hypothyroidism    MR (mental retardation)    Nephrolithiasis

## 2017-07-02 LAB
ANION GAP SERPL CALC-SCNC: 14 MMOL/L — SIGNIFICANT CHANGE UP (ref 5–17)
ANION GAP SERPL CALC-SCNC: 18 MMOL/L — HIGH (ref 5–17)
BUN SERPL-MCNC: 11 MG/DL — SIGNIFICANT CHANGE UP (ref 7–23)
BUN SERPL-MCNC: 11 MG/DL — SIGNIFICANT CHANGE UP (ref 7–23)
CALCIUM SERPL-MCNC: 8.6 MG/DL — SIGNIFICANT CHANGE UP (ref 8.4–10.5)
CALCIUM SERPL-MCNC: 8.7 MG/DL — SIGNIFICANT CHANGE UP (ref 8.4–10.5)
CHLORIDE SERPL-SCNC: 103 MMOL/L — SIGNIFICANT CHANGE UP (ref 96–108)
CHLORIDE SERPL-SCNC: 86 MMOL/L — LOW (ref 96–108)
CO2 SERPL-SCNC: 17 MMOL/L — LOW (ref 22–31)
CO2 SERPL-SCNC: 23 MMOL/L — SIGNIFICANT CHANGE UP (ref 22–31)
CREAT SERPL-MCNC: 1.1 MG/DL — SIGNIFICANT CHANGE UP (ref 0.5–1.3)
CREAT SERPL-MCNC: 1.15 MG/DL — SIGNIFICANT CHANGE UP (ref 0.5–1.3)
CULTURE RESULTS: SIGNIFICANT CHANGE UP
GLUCOSE SERPL-MCNC: 110 MG/DL — HIGH (ref 70–99)
GLUCOSE SERPL-MCNC: 114 MG/DL — HIGH (ref 70–99)
HCT VFR BLD CALC: 35 % — LOW (ref 39–50)
HGB BLD-MCNC: 12.5 G/DL — LOW (ref 13–17)
LACTATE SERPL-SCNC: 1.9 MMOL/L — SIGNIFICANT CHANGE UP (ref 0.7–2)
MCHC RBC-ENTMCNC: 33.3 PG — SIGNIFICANT CHANGE UP (ref 27–34)
MCHC RBC-ENTMCNC: 35.6 GM/DL — SIGNIFICANT CHANGE UP (ref 32–36)
MCV RBC AUTO: 93.5 FL — SIGNIFICANT CHANGE UP (ref 80–100)
PLATELET # BLD AUTO: 112 K/UL — LOW (ref 150–400)
POTASSIUM SERPL-MCNC: 3.8 MMOL/L — SIGNIFICANT CHANGE UP (ref 3.5–5.3)
POTASSIUM SERPL-MCNC: 4.2 MMOL/L — SIGNIFICANT CHANGE UP (ref 3.5–5.3)
POTASSIUM SERPL-SCNC: 3.8 MMOL/L — SIGNIFICANT CHANGE UP (ref 3.5–5.3)
POTASSIUM SERPL-SCNC: 4.2 MMOL/L — SIGNIFICANT CHANGE UP (ref 3.5–5.3)
RBC # BLD: 3.74 M/UL — LOW (ref 4.2–5.8)
RBC # FLD: 12.5 % — SIGNIFICANT CHANGE UP (ref 10.3–14.5)
SODIUM SERPL-SCNC: 121 MMOL/L — LOW (ref 135–145)
SODIUM SERPL-SCNC: 140 MMOL/L — SIGNIFICANT CHANGE UP (ref 135–145)
SPECIMEN SOURCE: SIGNIFICANT CHANGE UP
WBC # BLD: 11.8 K/UL — HIGH (ref 3.8–10.5)
WBC # FLD AUTO: 11.8 K/UL — HIGH (ref 3.8–10.5)

## 2017-07-02 PROCEDURE — 74177 CT ABD & PELVIS W/CONTRAST: CPT | Mod: 26

## 2017-07-02 PROCEDURE — 99222 1ST HOSP IP/OBS MODERATE 55: CPT | Mod: GC

## 2017-07-02 RX ORDER — KETOROLAC TROMETHAMINE 30 MG/ML
15 SYRINGE (ML) INJECTION ONCE
Qty: 0 | Refills: 0 | Status: DISCONTINUED | OUTPATIENT
Start: 2017-07-02 | End: 2017-07-02

## 2017-07-02 RX ORDER — SODIUM CHLORIDE 9 MG/ML
250 INJECTION INTRAMUSCULAR; INTRAVENOUS; SUBCUTANEOUS ONCE
Qty: 0 | Refills: 0 | Status: COMPLETED | OUTPATIENT
Start: 2017-07-02 | End: 2017-07-02

## 2017-07-02 RX ORDER — IBUPROFEN 200 MG
200 TABLET ORAL ONCE
Qty: 0 | Refills: 0 | Status: COMPLETED | OUTPATIENT
Start: 2017-07-02 | End: 2017-07-02

## 2017-07-02 RX ORDER — SODIUM CHLORIDE 9 MG/ML
250 INJECTION INTRAMUSCULAR; INTRAVENOUS; SUBCUTANEOUS ONCE
Qty: 0 | Refills: 0 | Status: DISCONTINUED | OUTPATIENT
Start: 2017-07-02 | End: 2017-07-02

## 2017-07-02 RX ORDER — VANCOMYCIN HCL 1 G
1000 VIAL (EA) INTRAVENOUS EVERY 12 HOURS
Qty: 0 | Refills: 0 | Status: DISCONTINUED | OUTPATIENT
Start: 2017-07-02 | End: 2017-07-02

## 2017-07-02 RX ORDER — SODIUM CHLORIDE 9 MG/ML
500 INJECTION INTRAMUSCULAR; INTRAVENOUS; SUBCUTANEOUS ONCE
Qty: 0 | Refills: 0 | Status: COMPLETED | OUTPATIENT
Start: 2017-07-02 | End: 2017-07-02

## 2017-07-02 RX ORDER — ACETAMINOPHEN 500 MG
1000 TABLET ORAL ONCE
Qty: 0 | Refills: 0 | Status: COMPLETED | OUTPATIENT
Start: 2017-07-02 | End: 2017-07-02

## 2017-07-02 RX ORDER — ACETAMINOPHEN 500 MG
325 TABLET ORAL ONCE
Qty: 0 | Refills: 0 | Status: DISCONTINUED | OUTPATIENT
Start: 2017-07-02 | End: 2017-07-02

## 2017-07-02 RX ADMIN — PANTOPRAZOLE SODIUM 40 MILLIGRAM(S): 20 TABLET, DELAYED RELEASE ORAL at 05:06

## 2017-07-02 RX ADMIN — SODIUM CHLORIDE 2000 MILLILITER(S): 9 INJECTION INTRAMUSCULAR; INTRAVENOUS; SUBCUTANEOUS at 13:48

## 2017-07-02 RX ADMIN — SODIUM CHLORIDE 100 MILLILITER(S): 9 INJECTION INTRAMUSCULAR; INTRAVENOUS; SUBCUTANEOUS at 09:10

## 2017-07-02 RX ADMIN — PIPERACILLIN AND TAZOBACTAM 25 GRAM(S): 4; .5 INJECTION, POWDER, LYOPHILIZED, FOR SOLUTION INTRAVENOUS at 21:49

## 2017-07-02 RX ADMIN — RISPERIDONE 0.25 MILLIGRAM(S): 4 TABLET ORAL at 18:30

## 2017-07-02 RX ADMIN — Medication 3 MILLILITER(S): at 12:42

## 2017-07-02 RX ADMIN — DIVALPROEX SODIUM 250 MILLIGRAM(S): 500 TABLET, DELAYED RELEASE ORAL at 18:30

## 2017-07-02 RX ADMIN — DIVALPROEX SODIUM 250 MILLIGRAM(S): 500 TABLET, DELAYED RELEASE ORAL at 05:07

## 2017-07-02 RX ADMIN — Medication 200 MILLIGRAM(S): at 16:38

## 2017-07-02 RX ADMIN — Medication 0.5 MILLIGRAM(S): at 12:42

## 2017-07-02 RX ADMIN — SODIUM CHLORIDE 1000 MILLILITER(S): 9 INJECTION INTRAMUSCULAR; INTRAVENOUS; SUBCUTANEOUS at 09:11

## 2017-07-02 RX ADMIN — SIMVASTATIN 20 MILLIGRAM(S): 20 TABLET, FILM COATED ORAL at 21:50

## 2017-07-02 RX ADMIN — Medication 400 MILLIGRAM(S): at 13:47

## 2017-07-02 RX ADMIN — RISPERIDONE 0.25 MILLIGRAM(S): 4 TABLET ORAL at 05:07

## 2017-07-02 RX ADMIN — ENOXAPARIN SODIUM 40 MILLIGRAM(S): 100 INJECTION SUBCUTANEOUS at 12:42

## 2017-07-02 RX ADMIN — Medication 3 MILLILITER(S): at 05:06

## 2017-07-02 RX ADMIN — Medication 75 MICROGRAM(S): at 05:06

## 2017-07-02 RX ADMIN — PIPERACILLIN AND TAZOBACTAM 25 GRAM(S): 4; .5 INJECTION, POWDER, LYOPHILIZED, FOR SOLUTION INTRAVENOUS at 05:06

## 2017-07-02 RX ADMIN — PIPERACILLIN AND TAZOBACTAM 25 GRAM(S): 4; .5 INJECTION, POWDER, LYOPHILIZED, FOR SOLUTION INTRAVENOUS at 13:50

## 2017-07-02 RX ADMIN — Medication 250 MILLIGRAM(S): at 05:13

## 2017-07-02 RX ADMIN — SODIUM CHLORIDE 100 MILLILITER(S): 9 INJECTION INTRAMUSCULAR; INTRAVENOUS; SUBCUTANEOUS at 13:47

## 2017-07-02 RX ADMIN — Medication 3 MILLILITER(S): at 18:29

## 2017-07-02 RX ADMIN — Medication 650 MILLIGRAM(S): at 05:06

## 2017-07-02 RX ADMIN — Medication 3 MILLILITER(S): at 23:08

## 2017-07-02 NOTE — CONSULT NOTE ADULT - SUBJECTIVE AND OBJECTIVE BOX
Patient is a 57y Male     Patient is a 57y old  Male who presents with a chief complaint of fever (01 Jul 2017 16:36)      HPI:  : 57 pt   BIBEMS for weakness and feve,  from  nursing  home, r H/O Mental retardation , BPD, hypothyroidism, normal pressure hydrocephalus s/p drainage x2, Parkinson's disease vs drug-induced movement disorder, generalized weakness x2 days, fever since last night, +cough productive,  no   no nausea/vomiting/diarrhea. no rash.   , on  8  [place], now,  not  sob,  has  chills PCP- Rubi Vasquez (01 Jul 2017 16:36)      PAST MEDICAL & SURGICAL HISTORY:  MR (mental retardation)  Hypothyroidism  Hydronephrosis with Renal and Ureteral Calculous Obstruction: at last admission in [place] ([place]) 2010  Nephrolithiasis  Bipolar Disorder  S/P Appendectomy: 1997      MEDICATIONS  (STANDING):  diVALproex  milliGRAM(s) Oral two times a day  simvastatin 20 milliGRAM(s) Oral at bedtime  benztropine 0.5 milliGRAM(s) Oral daily  risperiDONE   Tablet 0.25 milliGRAM(s) Oral two times a day  pantoprazole    Tablet 40 milliGRAM(s) Oral before breakfast  levothyroxine 75 MICROGram(s) Oral daily  enoxaparin Injectable 40 milliGRAM(s) SubCutaneous daily  sodium chloride 0.9%. 1000 milliLiter(s) (100 mL/Hr) IV Continuous <Continuous>  piperacillin/tazobactam IVPB. 3.375 Gram(s) IV Intermittent every 8 hours  vancomycin  IVPB 1000 milliGRAM(s) IV Intermittent every 12 hours  ALBUTerol/ipratropium for Nebulization 3 milliLiter(s) Nebulizer every 6 hours  ALBUTerol    90 MICROgram(s) HFA Inhaler 1 Puff(s) Inhalation every 4 hours  tiotropium 18 MICROgram(s) Capsule 1 Capsule(s) Inhalation daily      Allergies    No Known Allergies    Intolerances        SOCIAL HISTORY:  Denies ETOh,Smoking,     FAMILY HISTORY:  Family history of hyperparathyroidism (Sibling)      REVIEW OF SYSTEMS:    CONSTITUTIONAL: No weakness, fevers or chills  EYES/ENT: No visual changes;  No vertigo or throat pain   NECK: No pain or stiffness  RESPIRATORY: No cough, wheezing, hemoptysis; No shortness of breath  CARDIOVASCULAR: No chest pain or palpitations  GASTROINTESTINAL: No abdominal or epigastric pain. No nausea, vomiting, or hematemesis; No diarrhea or constipation. No melena or hematochezia.  GENITOURINARY: No dysuria, frequency or hematuria  NEUROLOGICAL: No numbness or weakness  SKIN: No itching, burning, rashes, or lesions   All other review of systems is negative unless indicated above.    VITAL:  T(C): , Max: 38.8 (07-01-17 @ 12:24)  T(F): , Max: 101.9 (07-01-17 @ 12:24)  HR: 101 (07-02-17 @ 09:00)  BP: 98/60 (07-02-17 @ 09:00)  BP(mean): --  RR: 18 (07-02-17 @ 09:00)  SpO2: 98% (07-02-17 @ 09:00)  Wt(kg): --    I and O's:    07-01 @ 07:01  -  07-02 @ 07:00  --------------------------------------------------------  IN: 1640 mL / OUT: 0 mL / NET: 1640 mL      Height (cm): 180.34 (07-01 @ 16:02)  Weight (kg): 98.9 (07-01 @ 16:02)  BMI (kg/m2): 30.4 (07-01 @ 16:02)  BSA (m2): 2.19 (07-01 @ 16:02)    PHYSICAL EXAM:    Constitutional: NAD  HEENT: PERRLA,   Neck: No JVD  Respiratory: CTA B/L  Cardiovascular: S1 and S2  Gastrointestinal: BS+, soft, NT/ND  Extremities: No peripheral edema  Neurological: A/O x 3, no focal deficits  Psychiatric: Normal mood, normal affect  : No Parsons  Skin: No rashes  Access: Not applicable  Back: No CVA tenderness    LABS:                        14.7   14.1  )-----------( 148      ( 01 Jul 2017 12:41 )             42.4     07-01    139  |  100  |  15  ----------------------------<  109<H>  4.1   |  23  |  1.19    Ca    9.7      01 Jul 2017 12:41    TPro  7.6  /  Alb  4.1  /  TBili  0.6  /  DBili  x   /  AST  10  /  ALT  11  /  AlkPhos  42  07-01          RADIOLOGY & ADDITIONAL STUDIES:

## 2017-07-02 NOTE — CONSULT NOTE ADULT - SUBJECTIVE AND OBJECTIVE BOX
Patient is a 57y old  Male who presents with a chief complaint of fever (01 Jul 2017 16:36)    HPI:  57 pt  BIBEMS for weakness and fever,  from  nursing  home, r H/O Mental retardation , BPD, hypothyroidism, normal pressure hydrocephalus s/p drainage x2, Parkinson's disease vs drug-induced movement disorder, generalized weakness x2 days, fever since last night, +cough productive,  no   no nausea/vomiting/diarrhea. no rash.    Patient recently admitted from 5/25-6/31 for sigmoid diverticulitis, completed course of PO abx Augmentin on discharge, last dose 6/11.     ED  Fever 101.9, --83 /75, SaO2-97%on O2  Leukocytosis 14.1. CMP -normal  CXR-no consolidation  Blood and urine cultures obtained  IV Vanc and Zosyn started    PAST MEDICAL & SURGICAL HISTORY:  MR (mental retardation)  Hypothyroidism  Hydronephrosis with Renal and Ureteral Calculous Obstruction: at last admission in [place] ([place]) 2010  Nephrolithiasis  Bipolar Disorder  S/P Appendectomy: 1997      Social history:    FAMILY HISTORY:  Family history of hyperparathyroidism (Sibling)    Allergies    No Known Allergies    Intolerances        Antimicrobials:    piperacillin/tazobactam IVPB. 3.375 Gram(s) IV Intermittent every 8 hours  vancomycin  IVPB 1000 milliGRAM(s) IV Intermittent every 12 hours        Vital Signs Last 24 Hrs  T(C): 38.8 (02 Jul 2017 04:59), Max: 38.8 (01 Jul 2017 12:24)  T(F): 101.8 (02 Jul 2017 04:59), Max: 101.9 (01 Jul 2017 12:24)  HR: 104 (02 Jul 2017 04:59) (83 - 108)  BP: 101/65 (02 Jul 2017 04:59) (101/65 - 116/77)  BP(mean): --  RR: 18 (02 Jul 2017 04:59) (18 - 20)  SpO2: 96% (02 Jul 2017 04:59) (94% - 100%)                              14.7   14.1  )-----------( 148      ( 01 Jul 2017 12:41 )             42.4         07-01    139  |  100  |  15  ----------------------------<  109<H>  4.1   |  23  |  1.19    Ca    9.7      01 Jul 2017 12:41    TPro  7.6  /  Alb  4.1  /  TBili  0.6  /  DBili  x   /  AST  10  /  ALT  11  /  AlkPhos  42  07-01      RECENT CULTURES:  Blood and urine culture from 7/1/17: In lab      RADIOLOGY   Xray Chest 1 View AP/PA (07.01.17 @ 14:25) >  The cardiac silhouette is magnified on this frontal view. There are low   lung volumes. There is discoid atelectasis at the left lung base. There   are no focal pulmonary consolidations or pneumothorax.      CT CHEST:  No focal consolidation, some haziness on bases. Official read pending Patient is a 57y old  Male who presents with a chief complaint of fever (01 Jul 2017 16:36)    HPI:  57 pt  BIBEMS  from  nursing  home, r H/O Mental retardation , recurrent diverticulitis, BPD, hypothyroidism, normal pressure hydrocephalus s/p drainage x2, Parkinson's disease vs drug-induced movement disorder for generalized weakness x2 days, fever since last night, +coug productive,  no   no nausea/vomiting/diarrhea. no rash. Pt also complains of diffuse lower quadrant abdominal pain with decreased PO intake w/o diarrhea or vomiting. Denies any dysuria or burning.     Patient recently admitted from 5/25-6/31 for sigmoid diverticulitis, completed course of PO abx Augmentin on discharge, last dose 6/11.     ED  Fever 101.9, --83 /75, SaO2-97%on O2  Leukocytosis 14.1. CMP -normal  CXR-no consolidation  Blood and urine cultures obtained  IV Vanc and Zosyn started    PAST MEDICAL & SURGICAL HISTORY:  MR (mental retardation)  Hypothyroidism  Hydronephrosis with Renal and Ureteral Calculous Obstruction: at last admission in [place] ([place]) 2010  Nephrolithiasis  Bipolar Disorder  S/P Appendectomy: 1997      Social history:    FAMILY HISTORY:  Family history of hyperparathyroidism (Sibling)    Allergies    No Known Allergies    Intolerances        Antimicrobials:    piperacillin/tazobactam IVPB. 3.375 Gram(s) IV Intermittent every 8 hours  vancomycin  IVPB 1000 milliGRAM(s) IV Intermittent every 12 hours        Vital Signs Last 24 Hrs  T(C): 38.8 (02 Jul 2017 04:59), Max: 38.8 (01 Jul 2017 12:24)  T(F): 101.8 (02 Jul 2017 04:59), Max: 101.9 (01 Jul 2017 12:24)  HR: 104 (02 Jul 2017 04:59) (83 - 108)  BP: 101/65 (02 Jul 2017 04:59) (101/65 - 116/77)  BP(mean): --  RR: 18 (02 Jul 2017 04:59) (18 - 20)  SpO2: 96% (02 Jul 2017 04:59) (94% - 100%)                              14.7   14.1  )-----------( 148      ( 01 Jul 2017 12:41 )             42.4         07-01    139  |  100  |  15  ----------------------------<  109<H>  4.1   |  23  |  1.19    Ca    9.7      01 Jul 2017 12:41    TPro  7.6  /  Alb  4.1  /  TBili  0.6  /  DBili  x   /  AST  10  /  ALT  11  /  AlkPhos  42  07-01      RECENT CULTURES:  Blood and urine culture from 7/1/17: In lab      RADIOLOGY   Xray Chest 1 View AP/PA (07.01.17 @ 14:25) >  The cardiac silhouette is magnified on this frontal view. There are low   lung volumes. There is discoid atelectasis at the left lung base. There   are no focal pulmonary consolidations or pneumothorax.      CT CHEST:  No focal consolidation, some haziness on bases. Official read pending

## 2017-07-02 NOTE — PROGRESS NOTE ADULT - ASSESSMENT
STILL  FEBRILE    TREATED  FOR  PRESUMED  PNA,  CT  CHEST, NO PNA  SEEN BY  HOUSE  ID.  CT  ABDOMEN PENDING    ON  IV  AB   DVT  PPX    H/P WINNIE STILL  FEBRILE    TREATED  FOR  PRESUMED  PNA,  CT  CHEST, NO PNA  SEEN BY  HOUSE  ID.  CT  ABDOMEN PENDING,  pt  does  not  have  a tender  abdomen, pt  has  demntia  and  states  he  has  pain  everywhere low sbp, iv  fluid  bollus    ON  IV  AB   DVT  PPX    H/P PARKINSONS

## 2017-07-02 NOTE — PROVIDER CONTACT NOTE (MEDICATION) - ASSESSMENT
Pt w/ increased lethargy. Responds to his name, verbal. Hx of mental retardation & bipolar; pt not a good historian. Oriented to self & place.

## 2017-07-02 NOTE — CONSULT NOTE ADULT - ASSESSMENT
57 M with baseline mental retardation, p/2 weakness and fever of 2 days duration  from  nursing  home. Noted to be febrile with leukocytosis on admission, CXR was not impressive for pneumonia. Recently admitted for diverticulitis.     Recommend  -Fu blood and urine cultures  Reasonable to continue broad spectrum abx--pending cultures  -Fu official CT chest read  -Stool for c diff if develops diarrhea  -May need a repeat CXR in next 24 hours  -Will discuss with attending 57 M with baseline mental retardation, p/2 weakness and fever of 2 days duration  from  nursing  home. Noted to be febrile with leukocytosis on admission, CXR was not impressive for pneumonia. Recently admitted for diverticulitis.     Recommend  -Fu blood and urine cultures  -Reasonable to continue broad spectrum abx--pending cultures  -Fu official CT chest read  -Stool for c diff if develops diarrhea  -May need a repeat CXR in next 24 hours  -Will discuss with attending 57 M with baseline mental retardation, p/2 weakness, fever, abdominal pain and cough of 2 days duration  from  nursing  home. Noted to be febrile with leukocytosis on admission, CXR was not impressive for pneumonia. Recently admitted for diverticulitis. Its likely that patient has recurrent diverticulitis.     Recommend  -Fu blood and urine cultures  -Reasonable to continue broad spectrum abx--pending cultures  -Fu official CT chest read  -Would get CT chest given abdominal pain and hx of recurrent diverticulitis  -Stool for c diff if develops diarrhea  -May need a repeat CXR in next 24 hours  -Will discuss with attending 57 M with baseline mental retardation, p/2 weakness, fever, abdominal pain and cough of 2 days duration  from  nursing  home. Noted to be febrile with leukocytosis on admission, CXR was not impressive for pneumonia. Recently admitted for diverticulitis. Its likely that patient has recurrent diverticulitis.     Recommend  -Fu blood and urine cultures  -Reasonable to continue broad spectrum abx--pending cultures  -Fu official CT chest read  -Would get CT abdomen given abdominal pain and hx of recurrent diverticulitis  -Stool for c diff if develops diarrhea  -May need a repeat CXR in next 24 hours  -Will discuss with attending 57 M with baseline mental retardation, p/2 weakness, fever, abdominal pain and cough of 2 days duration  from  nursing  home. Noted to be febrile with leukocytosis on admission, CXR was not impressive for pneumonia. Recently admitted for diverticulitis. Its likely that patient has recurrent diverticulitis.     Recommend  -Fu blood and urine cultures  -Fu official CT chest read  -Would get CT abdomen given abdominal pain and hx of recurrent diverticulitis  -Needs surgical eval for recurrent diverticulitis  -Stool for c diff if develops diarrhea  -C/W Zosyn for now. Dc Vancomycin--no pneumonia in CT  -Will discuss with attending

## 2017-07-02 NOTE — CONSULT NOTE ADULT - SUBJECTIVE AND OBJECTIVE BOX
Patient is a 57y old  Male who presents with a chief complaint of fever (2017 16:36)      HPI:  : 57 pt   BIBEMS for weakness and feve,  from  nursing  home, r H/O Mental retardation , BPD, hypothyroidism, normal pressure hydrocephalus s/p drainage x2, Parkinson's disease vs drug-induced movement disorder, generalized weakness x2 days, fever since last night, +cough productive,  no   no nausea/vomiting/diarrhea. no rash.   , on  8  [place], now,  not  sob,  has  chills PCP- Rubi Vasquez (2017 16:36)           *****PAST MEDICAL / Surgical  HISTORY:  PAST MEDICAL & SURGICAL HISTORY:  MR (mental retardation)  Hypothyroidism  Hydronephrosis with Renal and Ureteral Calculous Obstruction: at last admission in [place] ([place])   Nephrolithiasis  Bipolar Disorder  S/P Appendectomy:            *****FAMILY HISTORY:  FAMILY HISTORY:  Family history of hyperparathyroidism (Sibling)           *****SOCIAL HISTORY:  Alcohol: None  Smoking: None         *****ALLERGIES:   Allergies    No Known Allergies    Intolerances             *****MEDICATIONS:  MEDICATIONS  (STANDING):  diVALproex  milliGRAM(s) Oral two times a day  simvastatin 20 milliGRAM(s) Oral at bedtime  benztropine 0.5 milliGRAM(s) Oral daily  risperiDONE   Tablet 0.25 milliGRAM(s) Oral two times a day  pantoprazole    Tablet 40 milliGRAM(s) Oral before breakfast  levothyroxine 75 MICROGram(s) Oral daily  enoxaparin Injectable 40 milliGRAM(s) SubCutaneous daily  sodium chloride 0.9%. 1000 milliLiter(s) (100 mL/Hr) IV Continuous <Continuous>  piperacillin/tazobactam IVPB. 3.375 Gram(s) IV Intermittent every 8 hours  vancomycin  IVPB 1000 milliGRAM(s) IV Intermittent every 12 hours  ALBUTerol/ipratropium for Nebulization 3 milliLiter(s) Nebulizer every 6 hours  ALBUTerol    90 MICROgram(s) HFA Inhaler 1 Puff(s) Inhalation every 4 hours  tiotropium 18 MICROgram(s) Capsule 1 Capsule(s) Inhalation daily    MEDICATIONS  (PRN):  acetaminophen   Tablet 650 milliGRAM(s) Oral every 6 hours PRN For Temp greater than 38 C (100.4 F)  sodium biphosphate Rectal Enema 1 Enema Rectal daily PRN constipation  senna 2 Tablet(s) Oral at bedtime PRN Constipation           *****REVIEW OF SYSTEM:  GEN: no fever, no chills, no pain  RESP: no SOB, no cough, no sputum  CVS: no chest pain, no palpitations, no edema  GI: no abdominal pain, no nausea, no vomiting, no constipation, no diarrhea  : no dysurea, no frequency, no hematurea  Neuro: no headache, no dizziness  PSYCH: no anxiety, no depression  Derm : no itching, no rash         *****VITAL SIGNS:  T(C): 36.5 (17 @ 09:00), Max: 38.8 (17 @ 12:24)  HR: 101 (17 @ 09:00) (83 - 108)  BP: 98/60 (17 @ 09:00) (98/60 - 116/77)  RR: 18 (17 @ 09:00) (18 - 20)  SpO2: 98% (17 @ 09:00) (94% - 100%)  Wt(kg): --     @ 07:01  -  -02 @ 07:00  --------------------------------------------------------  IN: 1640 mL / OUT: 0 mL / NET: 1640 mL             *****PHYSICAL EXAM:  GEN: A&O X 3 , NAD , comfortable  HEENT: NCAT, PERRL, MMM, hearing intact  Neck: supple , no JVD  CVS: S1S2 , regular , No M/R/G appreciated  PULM: CTA B/L,  no W/R/R appreciated  ABD.: soft. non tender, non distended,  bowel sounds present  Extrem: intact pulses , no edema   Derm: No rash , no ecchymoses  PSYCH : normal mood,  no delusion not anxious         *****LAB AND IMAGIN.7   14.1  )-----------( 148      ( 2017 12:41 )             42.4               07-    139  |  100  |  15  ----------------------------<  109<H>  4.1   |  23  |  1.19    Ca    9.7      2017 12:41    TPro  7.6  /  Alb  4.1  /  TBili  0.6  /  DBili  x   /  AST  10  /  ALT  11  /  AlkPhos  42      PT/INR - ( 2017 12:41 )   PT: 12.6 sec;   INR: 1.15 ratio         PTT - ( 2017 12:41 )  PTT:29.9 sec            CARDIAC MARKERS ( 2017 12:41 )  x     / <0.01 ng/mL / x     / x     / x                  Urinalysis Basic - ( 2017 12:41 )    Color: Yellow / Appearance: Clear / S.020 / pH: x  Gluc: x / Ketone: Trace  / Bili: Negative / Urobili: 1   Blood: x / Protein: 30 mg/dL / Nitrite: Negative   Leuk Esterase: Small / RBC: 0-2 /HPF / WBC 11-25 /HPF   Sq Epi: x / Non Sq Epi: Negative /HPF / Bacteria: Negative /HPF        [All pertinent recent Imaging reports reviewed]         *****A S S E S S M E N T   A N D   P L A N :  57M fever, baseline MR  broad abx per ID  f/u cxs  CT abd pending  TTE  check RVP              ___________________________  Will follow with you.  Thank you,  JOSE Benjamin D.O.

## 2017-07-03 DIAGNOSIS — Z87.19 PERSONAL HISTORY OF OTHER DISEASES OF THE DIGESTIVE SYSTEM: ICD-10-CM

## 2017-07-03 LAB
ANION GAP SERPL CALC-SCNC: 13 MMOL/L — SIGNIFICANT CHANGE UP (ref 5–17)
BUN SERPL-MCNC: 8 MG/DL — SIGNIFICANT CHANGE UP (ref 7–23)
CALCIUM SERPL-MCNC: 8.6 MG/DL — SIGNIFICANT CHANGE UP (ref 8.4–10.5)
CHLORIDE SERPL-SCNC: 105 MMOL/L — SIGNIFICANT CHANGE UP (ref 96–108)
CO2 SERPL-SCNC: 22 MMOL/L — SIGNIFICANT CHANGE UP (ref 22–31)
CREAT SERPL-MCNC: 0.99 MG/DL — SIGNIFICANT CHANGE UP (ref 0.5–1.3)
GLUCOSE SERPL-MCNC: 93 MG/DL — SIGNIFICANT CHANGE UP (ref 70–99)
HCT VFR BLD CALC: 32.1 % — LOW (ref 39–50)
HCT VFR BLD CALC: 33.2 % — LOW (ref 39–50)
HGB BLD-MCNC: 10.8 G/DL — LOW (ref 13–17)
HGB BLD-MCNC: 11.2 G/DL — LOW (ref 13–17)
MCHC RBC-ENTMCNC: 29.8 PG — SIGNIFICANT CHANGE UP (ref 27–34)
MCHC RBC-ENTMCNC: 32.5 GM/DL — SIGNIFICANT CHANGE UP (ref 32–36)
MCHC RBC-ENTMCNC: 32.8 PG — SIGNIFICANT CHANGE UP (ref 27–34)
MCHC RBC-ENTMCNC: 35 GM/DL — SIGNIFICANT CHANGE UP (ref 32–36)
MCV RBC AUTO: 91.5 FL — SIGNIFICANT CHANGE UP (ref 80–100)
MCV RBC AUTO: 93.8 FL — SIGNIFICANT CHANGE UP (ref 80–100)
PLATELET # BLD AUTO: 105 K/UL — LOW (ref 150–400)
PLATELET # BLD AUTO: 119 K/UL — LOW (ref 150–400)
POTASSIUM SERPL-MCNC: 3.8 MMOL/L — SIGNIFICANT CHANGE UP (ref 3.5–5.3)
POTASSIUM SERPL-SCNC: 3.8 MMOL/L — SIGNIFICANT CHANGE UP (ref 3.5–5.3)
RBC # BLD: 3.42 M/UL — LOW (ref 4.2–5.8)
RBC # BLD: 3.63 M/UL — LOW (ref 4.2–5.8)
RBC # FLD: 12.4 % — SIGNIFICANT CHANGE UP (ref 10.3–14.5)
RBC # FLD: 14.1 % — SIGNIFICANT CHANGE UP (ref 10.3–14.5)
SODIUM SERPL-SCNC: 140 MMOL/L — SIGNIFICANT CHANGE UP (ref 135–145)
WBC # BLD: 10.4 K/UL — SIGNIFICANT CHANGE UP (ref 3.8–10.5)
WBC # BLD: 10.44 K/UL — SIGNIFICANT CHANGE UP (ref 3.8–10.5)
WBC # FLD AUTO: 10.4 K/UL — SIGNIFICANT CHANGE UP (ref 3.8–10.5)
WBC # FLD AUTO: 10.44 K/UL — SIGNIFICANT CHANGE UP (ref 3.8–10.5)

## 2017-07-03 PROCEDURE — 99232 SBSQ HOSP IP/OBS MODERATE 35: CPT

## 2017-07-03 RX ORDER — ACETAMINOPHEN 500 MG
650 TABLET ORAL ONCE
Qty: 0 | Refills: 0 | Status: COMPLETED | OUTPATIENT
Start: 2017-07-03 | End: 2017-07-03

## 2017-07-03 RX ORDER — TAMSULOSIN HYDROCHLORIDE 0.4 MG/1
0.4 CAPSULE ORAL AT BEDTIME
Qty: 0 | Refills: 0 | Status: DISCONTINUED | OUTPATIENT
Start: 2017-07-03 | End: 2017-07-10

## 2017-07-03 RX ADMIN — PIPERACILLIN AND TAZOBACTAM 25 GRAM(S): 4; .5 INJECTION, POWDER, LYOPHILIZED, FOR SOLUTION INTRAVENOUS at 22:34

## 2017-07-03 RX ADMIN — SODIUM CHLORIDE 100 MILLILITER(S): 9 INJECTION INTRAMUSCULAR; INTRAVENOUS; SUBCUTANEOUS at 11:54

## 2017-07-03 RX ADMIN — SODIUM CHLORIDE 100 MILLILITER(S): 9 INJECTION INTRAMUSCULAR; INTRAVENOUS; SUBCUTANEOUS at 17:22

## 2017-07-03 RX ADMIN — DIVALPROEX SODIUM 250 MILLIGRAM(S): 500 TABLET, DELAYED RELEASE ORAL at 05:20

## 2017-07-03 RX ADMIN — PIPERACILLIN AND TAZOBACTAM 25 GRAM(S): 4; .5 INJECTION, POWDER, LYOPHILIZED, FOR SOLUTION INTRAVENOUS at 05:19

## 2017-07-03 RX ADMIN — PANTOPRAZOLE SODIUM 40 MILLIGRAM(S): 20 TABLET, DELAYED RELEASE ORAL at 05:20

## 2017-07-03 RX ADMIN — TAMSULOSIN HYDROCHLORIDE 0.4 MILLIGRAM(S): 0.4 CAPSULE ORAL at 22:32

## 2017-07-03 RX ADMIN — Medication 3 MILLILITER(S): at 22:32

## 2017-07-03 RX ADMIN — Medication 3 MILLILITER(S): at 17:24

## 2017-07-03 RX ADMIN — Medication 650 MILLIGRAM(S): at 05:55

## 2017-07-03 RX ADMIN — Medication 0.5 MILLIGRAM(S): at 11:15

## 2017-07-03 RX ADMIN — DIVALPROEX SODIUM 250 MILLIGRAM(S): 500 TABLET, DELAYED RELEASE ORAL at 17:22

## 2017-07-03 RX ADMIN — PIPERACILLIN AND TAZOBACTAM 25 GRAM(S): 4; .5 INJECTION, POWDER, LYOPHILIZED, FOR SOLUTION INTRAVENOUS at 15:21

## 2017-07-03 RX ADMIN — Medication 650 MILLIGRAM(S): at 06:25

## 2017-07-03 RX ADMIN — SIMVASTATIN 20 MILLIGRAM(S): 20 TABLET, FILM COATED ORAL at 22:32

## 2017-07-03 RX ADMIN — Medication 650 MILLIGRAM(S): at 19:57

## 2017-07-03 RX ADMIN — Medication 3 MILLILITER(S): at 11:50

## 2017-07-03 RX ADMIN — Medication 3 MILLILITER(S): at 05:19

## 2017-07-03 RX ADMIN — ENOXAPARIN SODIUM 40 MILLIGRAM(S): 100 INJECTION SUBCUTANEOUS at 11:50

## 2017-07-03 RX ADMIN — RISPERIDONE 0.25 MILLIGRAM(S): 4 TABLET ORAL at 17:22

## 2017-07-03 RX ADMIN — RISPERIDONE 0.25 MILLIGRAM(S): 4 TABLET ORAL at 05:20

## 2017-07-03 RX ADMIN — Medication 75 MICROGRAM(S): at 05:20

## 2017-07-03 NOTE — DIETITIAN INITIAL EVALUATION ADULT. - ORAL INTAKE PTA
Pt reports eating well with good appetite PTA consuming 3 meals per day, pt resides in nursing facility and all meals are prepared for him. Diet recall: breakfast: oatmeal with milk, orange juice, Lunch/dinner: hot meal, usually protein, starch, veggie

## 2017-07-03 NOTE — CONSULT NOTE ADULT - ASSESSMENT
57M with hx of diverticulitis p/w fever and intermittent diffuse abdominal pain, most likely 2/2 prostatitis. Recurrent diverticulitis less likely given benign abdominal exam and CT A/P showing prostate inflammation with decreased diverticular inflammation compared to prior study. Hemodynamically stable, WBC decreasing, fever controlled with Tylenol, currently on IV Zosyn.

## 2017-07-03 NOTE — CONSULT NOTE ADULT - ASSESSMENT
Prostatic abscesses 57y Male who presented with fevers and UTI/sepsis, found to have prostatitis and areas of low attenuation on CT  -- obtain PVR  -- if pt is retaining, please place mora  -- c/w w/Zosyn  -- no acute intervention at this time; if persistently septic on broad spectrum antibiotics, consider reimage/IR drainage  -- d/w Dr. Martin 57y Male who presented with fevers and UTI/sepsis, found to have prostatitis and areas of low attenuation on CT  -- obtain PVR  -- if pt is retaining, please place mora  -- c/w Zosyn  -- no acute intervention at this time; if persistently septic on broad spectrum antibiotics, consider reimage/IR drainage  -- d/w Dr. Martin

## 2017-07-03 NOTE — CONSULT NOTE ADULT - ASSESSMENT
56yo M with history of diverticulitis, MR, hypothyroidism, and bipolar disorder, currently presenting with fever and crampy diffuse abdominal pain, most likely due to prostatitis. Urine culture was positive for coag negative staph and CT A/P shows prostate inflammation with decreased diverticular inflammation compared to before. Hemodynamically stable, WBC decreasing, fever controlled with Tylenol, currently on IV Zosyn.

## 2017-07-03 NOTE — CONSULT NOTE ADULT - SUBJECTIVE AND OBJECTIVE BOX
HPI: 57M with hx of diverticultis, MR, hypothyroidism, and bipolar disorder, currently presenting with fever and intermittent abdominal pain which occasionally radiates towards his back. He was also complaining of intermittent nausea and vomiting at his group home prior to coming to the hospital and says that sometimes his stool is watery but he is usually constipated. Last bowel movement was Friday.     Denies current cough, chest pain, nausea/vomiting, diarrhea, or abdominal pain. Tolerating clears this morning without issue, and asking for chicken for lunch.    He was first diagnosed with diverticulitis ten years ago, which has not bothered him until May of this year, when he was hospitalized for uncomplicated diverticulitis and treated with IV antibiotics. He returns to the hospital for fever work-up.    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 1000 milliLiter(s) IV Continuous <Continuous>  piperacillin/tazobactam IVPB. 3.375 Gram(s) IV Intermittent every 8 hours  ALBUTerol/ipratropium for Nebulization 3 milliLiter(s) Nebulizer every 6 hours  ALBUTerol    90 MICROgram(s) HFA Inhaler 1 Puff(s) Inhalation every 4 hours  tiotropium 18 MICROgram(s) Capsule 1 Capsule(s) Inhalation daily  tamsulosin 0.4 milliGRAM(s) Oral at bedtime    Medications (PRN):acetaminophen   Tablet 650 milliGRAM(s) Oral every 6 hours PRN  sodium biphosphate Rectal Enema 1 Enema Rectal daily PRN  senna 2 Tablet(s) Oral at bedtime PRN    Allergies: No Known Allergies  (Intolerances: )  Social Hx: Lives in group home    Physical Exam  T(C): 37 (17 @ 07:22)  HR: 92 (17 @ 07:22) (92 - 123)  BP: 102/65 (17 @ 07:22) (90/54 - 102/66)  RR: 18 (17 @ 07:22) (18 - 18)  SpO2: 95% (17 @ 07:22) (94% - 96%)  Wt(kg): --  Tmax: T(C): , Max: 38.9 (17 @ 13:20)  Wt(kg): --    17  -  17  --------------------------------------------------------  IN:    Oral Fluid: 120 mL    sodium chloride 0.9%.: 1500 mL    Solution: 300 mL  Total IN: 1920 mL    OUT:  Total OUT: 0 mL    Total NET: 1920 mL      17  -  17  --------------------------------------------------------  IN:  Total IN: 0 mL    OUT:    Voided: 300 mL  Total OUT: 300 mL    Total NET: -300 mL      General: well developed, well nourished, NAD  Neuro: alert and oriented, no focal deficits, moves all extremities spontaneously  HEENT: NCAT, EOMI, anicteric, mucosa moist  Respiratory: airway patent, CTABL  CVS: regular rate and rhythm, no murmurs rubs, or gallops  Abdomen: soft, non distended, non tender, normal BS, no HSM, no rebound tenderness  Rectal exam: normal sphincter tone, stool in vault, prostate not palpated, no blood  Extremities: no edema, sensation and movement grossly intact, no cyanosis  Skin: warm, dry, appropriate color    Labs:                        10.8   10.44 )-----------( 119      ( 2017 07:20 )             33.2     PT/INR - ( 2017 12:41 )   PT: 12.6 sec;   INR: 1.15 ratio         PTT - ( 2017 12:41 )  PTT:29.9 sec      140  |  105  |  8   ----------------------------<  93  3.8   |  22  |  0.99    Ca    8.6      2017 07:12    TPro  7.6  /  Alb  4.1  /  TBili  0.6  /  DBili  x   /  AST  10  /  ALT  11  /  AlkPhos  42      Urinalysis Basic - ( 2017 12:41 )    Color: Yellow / Appearance: Clear / S.020 / pH: x  Gluc: x / Ketone: Trace  / Bili: Negative / Urobili: 1   Blood: x / Protein: 30 mg/dL / Nitrite: Negative   Leuk Esterase: Small / RBC: 0-2 /HPF / WBC 11-25 /HPF   Sq Epi: x / Non Sq Epi: Negative /HPF / Bacteria: Negative /HPF    Urine Culture - ( 2017 15:10)  50,000 - 99,000 Coag negative staph    Blood Cultures - (2017 15:10 and 15:17)  Negative to date    Imaging and other studies:  CT Chest: No consolidations, small right pleural effusion  CT A/P w/ contrast:Acutely enlarged prostate gland with mild surrounding inflammation, concerning for prostatitis. In addition, there is a heterogeneous appearance of the prostate gland with ill-defined low-attenuation areas, raising consideration of focal abscesses. Mild infiltration of the perivesical fat. Interval decrease in the previously seen ascending colonic diverticula inflammation.

## 2017-07-03 NOTE — CONSULT NOTE ADULT - ATTENDING COMMENTS
Covering for Dr Martin. Agree with above note. If clinical decline, will consider IR/surgical intervention.
57 M with mental retardation from NH here with fever, abdominal pain and cough x 2 days.  CXR negative.  Diverticulitis twice in the past 1.5 months.  Likely recurrent diverticulitis.     1. f/u blood and urine cultures  2. Would get CT abdomen given abdominal pain and hx of recurrent diverticulitis  3. Surgical eval for recurrent diverticulitis  4. cont Zosyn. Dc Vancomycin--no pneumonia in CT    Pager [phone].  After 5pm or weekends call ID office at [phone].

## 2017-07-03 NOTE — CONSULT NOTE ADULT - SUBJECTIVE AND OBJECTIVE BOX
HPI: 58yo M with history of diverticulitis, MR, hypothyroidism, and bipolar disorder, currently presenting with fever and crampy diffuse abdominal pain which occasionally radiates towards his back that gets worse when he eats solid food. He was also complaining of intermittent nausea and vomiting at his group home prior to coming to the hospital and says that sometimes his stool is watery but he is usually constipated. Last bowel movement was Friday. Denies current cough, chest pain, nausea/vomiting, or diarrhea.  He was first diagnosed with diverticulitis ten years ago, which has not bothered him until May of this year, when he was hospitalized for diverticulitis and treated with IV antibiotics.    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 1000 milliLiter(s) IV Continuous <Continuous>  piperacillin/tazobactam IVPB. 3.375 Gram(s) IV Intermittent every 8 hours  ALBUTerol/ipratropium for Nebulization 3 milliLiter(s) Nebulizer every 6 hours  ALBUTerol    90 MICROgram(s) HFA Inhaler 1 Puff(s) Inhalation every 4 hours  tiotropium 18 MICROgram(s) Capsule 1 Capsule(s) Inhalation daily  tamsulosin 0.4 milliGRAM(s) Oral at bedtime    Medications (PRN):acetaminophen   Tablet 650 milliGRAM(s) Oral every 6 hours PRN  sodium biphosphate Rectal Enema 1 Enema Rectal daily PRN  senna 2 Tablet(s) Oral at bedtime PRN    Allergies: No Known Allergies  (Intolerances: )  Social Hx: Lives in group home    Physical Exam  T(C): 37 (17 @ 07:22)  HR: 92 (17 @ 07:22) (92 - 123)  BP: 102/65 (17 @ 07:22) (90/54 - 102/66)  RR: 18 (17 @ 07:22) (18 - 18)  SpO2: 95% (17 @ 07:22) (94% - 96%)  Wt(kg): --  Tmax: T(C): , Max: 38.9 (17 @ 13:20)  Wt(kg): --    17  -  17  --------------------------------------------------------  IN:    Oral Fluid: 120 mL    sodium chloride 0.9%.: 1500 mL    Solution: 300 mL  Total IN: 1920 mL    OUT:  Total OUT: 0 mL    Total NET: 1920 mL      17  -  17  --------------------------------------------------------  IN:  Total IN: 0 mL    OUT:    Voided: 300 mL  Total OUT: 300 mL    Total NET: -300 mL        General: well developed, well nourished, NAD  Neuro: alert and oriented, no focal deficits, moves all extremities spontaneously  HEENT: NCAT, EOMI, anicteric, mucosa moist  Respiratory: airway patent, CTABL  CVS: regular rate and rhythm, no murmurs rubs, or gallops  Abdomen: soft, mildly distended, slightly tender to palpation, normal BS, no HSM, no rebound tenderness  Rectal exam: normal sphincter tone, stool in vault, prostate nontender on palpation, no blood.  Extremities: no edema, sensation and movement grossly intact, no cyanosis  Skin: warm, dry, appropriate color    Labs:                        10.8   10.44 )-----------( 119      ( 2017 07:20 )             33.2     PT/INR - ( 2017 12:41 )   PT: 12.6 sec;   INR: 1.15 ratio         PTT - ( 2017 12:41 )  PTT:29.9 sec      140  |  105  |  8   ----------------------------<  93  3.8   |  22  |  0.99    Ca    8.6      2017 07:12    TPro  7.6  /  Alb  4.1  /  TBili  0.6  /  DBili  x   /  AST  10  /  ALT  11  /  AlkPhos  42      Urinalysis Basic - ( 2017 12:41 )    Color: Yellow / Appearance: Clear / S.020 / pH: x  Gluc: x / Ketone: Trace  / Bili: Negative / Urobili: 1   Blood: x / Protein: 30 mg/dL / Nitrite: Negative   Leuk Esterase: Small / RBC: 0-2 /HPF / WBC 11-25 /HPF   Sq Epi: x / Non Sq Epi: Negative /HPF / Bacteria: Negative /HPF    Urine Culture - ( 2017 15:10)  50,000 - 99,000 Coag negative staph    Blood Cultures - (2017 15:10 and 15:17)  Negative to date    Imaging and other studies:  CT Chest: No consolidations, small right pleural effusion  CT A/P w/ contrast: Acutely enlarged prostate gland with inflammation concerning for prostatitis with focal abscesses. Decreased ascending colonic diverticular inflammation compared to previous study. HPI: 56yo M with history of diverticulitis, MR, hypothyroidism, and bipolar disorder, currently presenting with fever and intermittent crampy diffuse abdominal pain which occasionally radiates towards his back that gets worse when he eats solid food. He was also complaining of intermittent nausea and vomiting at his group home prior to coming to the hospital and says that sometimes his stool is watery but he is usually constipated. Last bowel movement was Friday.     Denies current cough, chest pain, nausea/vomiting, diarrhea, or abdominal pain. Tolerating clears this morning without issue.    He was first diagnosed with diverticulitis ten years ago, which has not bothered him until May of this year, when he was hospitalized for diverticulitis and treated with IV antibiotics. He returns to the hospital for fever work-up.    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 1000 milliLiter(s) IV Continuous <Continuous>  piperacillin/tazobactam IVPB. 3.375 Gram(s) IV Intermittent every 8 hours  ALBUTerol/ipratropium for Nebulization 3 milliLiter(s) Nebulizer every 6 hours  ALBUTerol    90 MICROgram(s) HFA Inhaler 1 Puff(s) Inhalation every 4 hours  tiotropium 18 MICROgram(s) Capsule 1 Capsule(s) Inhalation daily  tamsulosin 0.4 milliGRAM(s) Oral at bedtime    Medications (PRN):acetaminophen   Tablet 650 milliGRAM(s) Oral every 6 hours PRN  sodium biphosphate Rectal Enema 1 Enema Rectal daily PRN  senna 2 Tablet(s) Oral at bedtime PRN    Allergies: No Known Allergies  (Intolerances: )  Social Hx: Lives in group home    Physical Exam  T(C): 37 (17 @ 07:22)  HR: 92 (17 @ 07:22) (92 - 123)  BP: 102/65 (17 @ 07:22) (90/54 - 102/66)  RR: 18 (17 @ 07:22) (18 - 18)  SpO2: 95% (17 @ 07:22) (94% - 96%)  Wt(kg): --  Tmax: T(C): , Max: 38.9 (17 @ 13:20)  Wt(kg): --    17  -  17  --------------------------------------------------------  IN:    Oral Fluid: 120 mL    sodium chloride 0.9%.: 1500 mL    Solution: 300 mL  Total IN: 1920 mL    OUT:  Total OUT: 0 mL    Total NET: 1920 mL      17  -  17  --------------------------------------------------------  IN:  Total IN: 0 mL    OUT:    Voided: 300 mL  Total OUT: 300 mL    Total NET: -300 mL      General: well developed, well nourished, NAD  Neuro: alert and oriented, no focal deficits, moves all extremities spontaneously  HEENT: NCAT, EOMI, anicteric, mucosa moist  Respiratory: airway patent, CTABL  CVS: regular rate and rhythm, no murmurs rubs, or gallops  Abdomen: soft, non distended, non tender, normal BS, no HSM, no rebound tenderness  Rectal exam: normal sphincter tone, stool in vault, prostate not palpated, no blood  Extremities: no edema, sensation and movement grossly intact, no cyanosis  Skin: warm, dry, appropriate color    Labs:                        10.8   10.44 )-----------( 119      ( 2017 07:20 )             33.2     PT/INR - ( 2017 12:41 )   PT: 12.6 sec;   INR: 1.15 ratio         PTT - ( 2017 12:41 )  PTT:29.9 sec  -    140  |  105  |  8   ----------------------------<  93  3.8   |  22  |  0.99    Ca    8.6      2017 07:12    TPro  7.6  /  Alb  4.1  /  TBili  0.6  /  DBili  x   /  AST  10  /  ALT  11  /  AlkPhos  42  07-    Urinalysis Basic - ( 2017 12:41 )    Color: Yellow / Appearance: Clear / S.020 / pH: x  Gluc: x / Ketone: Trace  / Bili: Negative / Urobili: 1   Blood: x / Protein: 30 mg/dL / Nitrite: Negative   Leuk Esterase: Small / RBC: 0-2 /HPF / WBC 11-25 /HPF   Sq Epi: x / Non Sq Epi: Negative /HPF / Bacteria: Negative /HPF    Urine Culture - ( 2017 15:10)  50,000 - 99,000 Coag negative staph    Blood Cultures - (2017 15:10 and 15:17)  Negative to date    Imaging and other studies:  CT Chest: No consolidations, small right pleural effusion  CT A/P w/ contrast:Acutely enlarged prostate gland with mild surrounding inflammation, concerning for prostatitis. In addition, there is a heterogeneous appearance of the prostate gland with ill-defined low-attenuation areas, raising consideration of focal abscesses. Mild infiltration of the perivesical fat. Interval decrease in the previously seen ascending colonic diverticula inflammation.

## 2017-07-03 NOTE — PROVIDER CONTACT NOTE (OTHER) - SITUATION
As above; pt opting for clears and jello due to experiencing sharp abdominal pain after eating solids.

## 2017-07-03 NOTE — DIETITIAN INITIAL EVALUATION ADULT. - NS AS NUTRI INTERV MEALS SNACK
1. Continue soft diet as ordered-reviewed soft items available on the menu and assisted pt with filling out menu today, monitor pt tolerance to diet, 2. Will provide trial of ensure enlive to further optimize intake, 3. Continue to encourage po intake and obtain/honor food preferences as able, 4. Monitor PO intake, diet tolerance, weight trends, labs, and skin integrity, 5. RD to remain available as needed

## 2017-07-03 NOTE — CONSULT NOTE ADULT - CONSULT REQUESTED DATE/TIME
02-Jul-2017 09:16
02-Jul-2017 08:13
02-Jul-2017 09:52
03-Jul-2017
03-Jul-2017 09:45
03-Jul-2017 12:38

## 2017-07-03 NOTE — CONSULT NOTE ADULT - PROBLEM SELECTOR RECOMMENDATION 9
- Current problems of fever and abdominal pain not likely due to diverticulitis.  - No indication for surgery during this admission.  - Consider follow up outpatient after discharge regarding possibility for elective colectomy for diverticulitis.
-no indication for surgery at this time as fever and abdominal pain unlikely 2/2 to recurrence of diverticulitis based on physical exam and CT findings  -continue antibiotic therapy for prostatitis  -d/w Dr. Campbell

## 2017-07-03 NOTE — DIETITIAN INITIAL EVALUATION ADULT. - PT NOT SOURCE
Pt with history of mental retardation, pt with slurred speech, able to respond appropriately to questions

## 2017-07-03 NOTE — DIETITIAN INITIAL EVALUATION ADULT. - ENERGY NEEDS
Pt seen for consult for decreased po intake. Pt with PMH including diverticulitis, mental retardation, parkinson's disease vs drug induced movement disorder admitted with generalized weakness and fever x 2 days found to have likely prostatitis on antibiotics.     Ht:5'11" , Wt:218 lbs, BMI:30.4 kg/m2, IBW:172 lbs +/- 10%, %IBW: 127%  No edema, Skin intact

## 2017-07-03 NOTE — PROGRESS NOTE ADULT - ASSESSMENT
pt  with  WBC  DECREASING, ON  ZOSYN, BD C.S, NEGATIVE, URINE  WITH  CNS    CT  ABD,  NO  DIVERTICULITIS,  ?  PROSTATITIS    DVT  PPX, HOUSE  ID  TO  F/P

## 2017-07-03 NOTE — PROGRESS NOTE ADULT - ASSESSMENT
57M with mental retardation (NHresident) here with recent diverticulitis exacerbation, now here with fever, found to have prostatitis on CT.  no urinary symptoms    1. looks clinically well, febrile x 24 hours, blood cultures negative, urine cultures with CoNS  2. can probably change to bactrim 1DS bid x 1 month but would get urology eval first (early abscesses on CT)

## 2017-07-03 NOTE — DIETITIAN INITIAL EVALUATION ADULT. - OTHER INFO
Pt appears to have gained weight over the past 3 years from 166.7 lbs (1/2014).  Recently pt reports UBW of 212 lbs, stated over the past 4 months his weight declined to most recent weight of 204 lbs which pt reports he weighed last week, noted current admit weight via bed scale 218 lbs (7/1), closely monitor wts, obtain standing weight as able. Pt with no food allergies, takes vitamin B2 and vitamin D as per H&P. Pt reports nausea after eating recently as well as abdominal pain with solid foods. No BM documented thus far. Pt with no difficulty chewing/swallowing. For breakfast today pt had applesauce, juice, and 1/2 a roll-pt stated he tolerated this well with no pain, encouraged pt to try more bland, soft foods at this time.

## 2017-07-03 NOTE — CONSULT NOTE ADULT - SUBJECTIVE AND OBJECTIVE BOX
HPI:  Patient is a 57y Male who presented with fevers and UTI/sepsis, found to have an enlarged inflamed prostate on CT scan with ?abscesses.  He states he is no longer feverish, and the urine does come out slow, as well as feeling incomplete emptying. He denies any dysuria or gross hematuria. He has been seen in the past by Dr. Martin for kidney stones.    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 1000 milliLiter(s) (100 mL/Hr) IV Continuous <Continuous>  piperacillin/tazobactam IVPB. 3.375 Gram(s) IV Intermittent every 8 hours  ALBUTerol/ipratropium for Nebulization 3 milliLiter(s) Nebulizer every 6 hours  ALBUTerol    90 MICROgram(s) HFA Inhaler 1 Puff(s) Inhalation every 4 hours  tiotropium 18 MICROgram(s) Capsule 1 Capsule(s) Inhalation daily  tamsulosin 0.4 milliGRAM(s) Oral at bedtime    MEDICATIONS  (PRN):  acetaminophen   Tablet 650 milliGRAM(s) Oral every 6 hours PRN For Temp greater than 38 C (100.4 F)  sodium biphosphate Rectal Enema 1 Enema Rectal daily PRN constipation  senna 2 Tablet(s) Oral at bedtime PRN Constipation    FAMILY HISTORY:  Family history of hyperparathyroidism (Sibling)    Allergies    No Known Allergies      SOCIAL HISTORY:   Tobacco hx: non smoker    REVIEW OF SYSTEMS: Pertinent positives and negatives as stated in HPI, otherwise negative    Vital signs  T(C): 37 (07-03-17 @ 16:06), Max: 37.6 (07-03-17 @ 04:55)  HR: 96 (07-03-17 @ 16:06)  BP: 114/73 (07-03-17 @ 16:06)  SpO2: 99% (07-03-17 @ 16:06)  Wt(kg): --    Output  I&O's Summary    02 Jul 2017 07:01  -  03 Jul 2017 07:00  --------------------------------------------------------  IN: 1920 mL / OUT: 0 mL / NET: 1920 mL    03 Jul 2017 07:01  -  03 Jul 2017 18:02  --------------------------------------------------------  IN: 500 mL / OUT: 800 mL / NET: -300 mL        Physical Exam  Gen: NAD  Pulm: No respiratory distress, no subcostal retractions  CV: RRR, no JVD  Abd: Soft, NT, ND  Back: No CVAT  MSK: No edema present    LABS:          07-03 @ 07:20    WBC 10.44 / Hct 33.2  / SCr --       07-03 @ 07:12    WBC --    / Hct --    / SCr 0.99     07-03    140  |  105  |  8   ----------------------------<  93  3.8   |  22  |  0.99    Ca    8.6      03 Jul 2017 07:12            Urine Cx: Culture - Urine (07.01.17 @ 15:10)    Specimen Source: .Urine Catheterized    Culture Results:   50,000 - 99,000 CFU/mL Coag Negative Staphylococcus        Blood Cx: Culture - Blood (07.01.17 @ 15:17)    Specimen Source: .Blood Blood-Peripheral    Culture Results:   No growth to date.            RADIOLOGY:    < from: CT Abdomen and Pelvis w/ IV Cont (07.02.17 @ 17:39) >  IMPRESSION:   Acutely enlarged prostate gland with mild surrounding inflammation,   concerning for prostatitis. In addition, there is a heterogeneous   appearance of the prostate gland with ill-defined low-attenuation areas,   raising consideration of focal abscesses. Mild infiltration of the   perivesical fat.  Interval decrease in the previously seen ascending colonic diverticula   inflammation.    < end of copied text >

## 2017-07-03 NOTE — PROGRESS NOTE ADULT - ASSESSMENT
diverticulitis recurrent.  abx per id.  Jon bradley called for surgery eval.  Will need colon in 6-8 wks

## 2017-07-04 LAB
ANION GAP SERPL CALC-SCNC: 14 MMOL/L — SIGNIFICANT CHANGE UP (ref 5–17)
BUN SERPL-MCNC: 7 MG/DL — SIGNIFICANT CHANGE UP (ref 7–23)
CALCIUM SERPL-MCNC: 8.8 MG/DL — SIGNIFICANT CHANGE UP (ref 8.4–10.5)
CHLORIDE SERPL-SCNC: 94 MMOL/L — LOW (ref 96–108)
CO2 SERPL-SCNC: 19 MMOL/L — LOW (ref 22–31)
CREAT SERPL-MCNC: 0.84 MG/DL — SIGNIFICANT CHANGE UP (ref 0.5–1.3)
GLUCOSE SERPL-MCNC: 89 MG/DL — SIGNIFICANT CHANGE UP (ref 70–99)
HCT VFR BLD CALC: 31.3 % — LOW (ref 39–50)
HGB BLD-MCNC: 10.1 G/DL — LOW (ref 13–17)
MCHC RBC-ENTMCNC: 29.6 PG — SIGNIFICANT CHANGE UP (ref 27–34)
MCHC RBC-ENTMCNC: 32.3 GM/DL — SIGNIFICANT CHANGE UP (ref 32–36)
MCV RBC AUTO: 91.8 FL — SIGNIFICANT CHANGE UP (ref 80–100)
PLATELET # BLD AUTO: 122 K/UL — LOW (ref 150–400)
POTASSIUM SERPL-MCNC: 3.8 MMOL/L — SIGNIFICANT CHANGE UP (ref 3.5–5.3)
POTASSIUM SERPL-SCNC: 3.8 MMOL/L — SIGNIFICANT CHANGE UP (ref 3.5–5.3)
RBC # BLD: 3.41 M/UL — LOW (ref 4.2–5.8)
RBC # FLD: 14.5 % — SIGNIFICANT CHANGE UP (ref 10.3–14.5)
SODIUM SERPL-SCNC: 127 MMOL/L — LOW (ref 135–145)
WBC # BLD: 6.55 K/UL — SIGNIFICANT CHANGE UP (ref 3.8–10.5)
WBC # FLD AUTO: 6.55 K/UL — SIGNIFICANT CHANGE UP (ref 3.8–10.5)

## 2017-07-04 PROCEDURE — 99222 1ST HOSP IP/OBS MODERATE 55: CPT

## 2017-07-04 RX ORDER — ACETAMINOPHEN 500 MG
650 TABLET ORAL ONCE
Qty: 0 | Refills: 0 | Status: COMPLETED | OUTPATIENT
Start: 2017-07-04 | End: 2017-07-04

## 2017-07-04 RX ORDER — DIVALPROEX SODIUM 500 MG/1
750 TABLET, DELAYED RELEASE ORAL
Qty: 0 | Refills: 0 | Status: DISCONTINUED | OUTPATIENT
Start: 2017-07-04 | End: 2017-07-10

## 2017-07-04 RX ORDER — METOCLOPRAMIDE HCL 10 MG
10 TABLET ORAL ONCE
Qty: 0 | Refills: 0 | Status: COMPLETED | OUTPATIENT
Start: 2017-07-04 | End: 2017-07-04

## 2017-07-04 RX ORDER — KETOROLAC TROMETHAMINE 30 MG/ML
15 SYRINGE (ML) INJECTION ONCE
Qty: 0 | Refills: 0 | Status: DISCONTINUED | OUTPATIENT
Start: 2017-07-04 | End: 2017-07-04

## 2017-07-04 RX ORDER — MECLIZINE HCL 12.5 MG
12.5 TABLET ORAL ONCE
Qty: 0 | Refills: 0 | Status: COMPLETED | OUTPATIENT
Start: 2017-07-04 | End: 2017-07-04

## 2017-07-04 RX ORDER — ONDANSETRON 8 MG/1
4 TABLET, FILM COATED ORAL ONCE
Qty: 0 | Refills: 0 | Status: COMPLETED | OUTPATIENT
Start: 2017-07-04 | End: 2017-07-04

## 2017-07-04 RX ADMIN — Medication 650 MILLIGRAM(S): at 20:26

## 2017-07-04 RX ADMIN — Medication 650 MILLIGRAM(S): at 01:51

## 2017-07-04 RX ADMIN — Medication 3 MILLILITER(S): at 21:24

## 2017-07-04 RX ADMIN — Medication 3 MILLILITER(S): at 11:50

## 2017-07-04 RX ADMIN — Medication 0.5 MILLIGRAM(S): at 11:50

## 2017-07-04 RX ADMIN — ONDANSETRON 4 MILLIGRAM(S): 8 TABLET, FILM COATED ORAL at 22:51

## 2017-07-04 RX ADMIN — PIPERACILLIN AND TAZOBACTAM 25 GRAM(S): 4; .5 INJECTION, POWDER, LYOPHILIZED, FOR SOLUTION INTRAVENOUS at 05:24

## 2017-07-04 RX ADMIN — Medication 15 MILLIGRAM(S): at 21:54

## 2017-07-04 RX ADMIN — Medication 15 MILLIGRAM(S): at 06:00

## 2017-07-04 RX ADMIN — Medication 3 MILLILITER(S): at 17:29

## 2017-07-04 RX ADMIN — Medication 650 MILLIGRAM(S): at 19:03

## 2017-07-04 RX ADMIN — PANTOPRAZOLE SODIUM 40 MILLIGRAM(S): 20 TABLET, DELAYED RELEASE ORAL at 05:24

## 2017-07-04 RX ADMIN — Medication 650 MILLIGRAM(S): at 10:47

## 2017-07-04 RX ADMIN — DIVALPROEX SODIUM 750 MILLIGRAM(S): 500 TABLET, DELAYED RELEASE ORAL at 17:30

## 2017-07-04 RX ADMIN — DIVALPROEX SODIUM 250 MILLIGRAM(S): 500 TABLET, DELAYED RELEASE ORAL at 05:24

## 2017-07-04 RX ADMIN — SIMVASTATIN 20 MILLIGRAM(S): 20 TABLET, FILM COATED ORAL at 21:23

## 2017-07-04 RX ADMIN — Medication 15 MILLIGRAM(S): at 21:24

## 2017-07-04 RX ADMIN — ONDANSETRON 4 MILLIGRAM(S): 8 TABLET, FILM COATED ORAL at 01:52

## 2017-07-04 RX ADMIN — RISPERIDONE 0.25 MILLIGRAM(S): 4 TABLET ORAL at 05:24

## 2017-07-04 RX ADMIN — Medication 3 MILLILITER(S): at 05:23

## 2017-07-04 RX ADMIN — Medication 15 MILLIGRAM(S): at 05:23

## 2017-07-04 RX ADMIN — ENOXAPARIN SODIUM 40 MILLIGRAM(S): 100 INJECTION SUBCUTANEOUS at 11:50

## 2017-07-04 RX ADMIN — PIPERACILLIN AND TAZOBACTAM 25 GRAM(S): 4; .5 INJECTION, POWDER, LYOPHILIZED, FOR SOLUTION INTRAVENOUS at 13:26

## 2017-07-04 RX ADMIN — Medication 10 MILLIGRAM(S): at 05:46

## 2017-07-04 RX ADMIN — Medication 75 MICROGRAM(S): at 05:24

## 2017-07-04 RX ADMIN — PIPERACILLIN AND TAZOBACTAM 25 GRAM(S): 4; .5 INJECTION, POWDER, LYOPHILIZED, FOR SOLUTION INTRAVENOUS at 21:39

## 2017-07-04 RX ADMIN — RISPERIDONE 0.25 MILLIGRAM(S): 4 TABLET ORAL at 17:29

## 2017-07-04 RX ADMIN — TAMSULOSIN HYDROCHLORIDE 0.4 MILLIGRAM(S): 0.4 CAPSULE ORAL at 21:23

## 2017-07-04 RX ADMIN — Medication 650 MILLIGRAM(S): at 11:42

## 2017-07-04 NOTE — PROGRESS NOTE ADULT - ASSESSMENT
prostatitis  with  abscess,  right  side  of  prostate,  seen by  urology, no  intervention now   ID  HOUSE  TO  F/P, ON  ZOSYN,      DVT  PPX'   PSYCH  MEDS, LABS  STABLE,  NORMAL  WBC, NOW

## 2017-07-05 LAB
ANION GAP SERPL CALC-SCNC: 12 MMOL/L — SIGNIFICANT CHANGE UP (ref 5–17)
BUN SERPL-MCNC: 6 MG/DL — LOW (ref 7–23)
CALCIUM SERPL-MCNC: 8.4 MG/DL — SIGNIFICANT CHANGE UP (ref 8.4–10.5)
CHLORIDE SERPL-SCNC: 109 MMOL/L — HIGH (ref 96–108)
CO2 SERPL-SCNC: 26 MMOL/L — SIGNIFICANT CHANGE UP (ref 22–31)
CREAT SERPL-MCNC: 0.88 MG/DL — SIGNIFICANT CHANGE UP (ref 0.5–1.3)
GLUCOSE SERPL-MCNC: 97 MG/DL — SIGNIFICANT CHANGE UP (ref 70–99)
HCT VFR BLD CALC: 30.6 % — LOW (ref 39–50)
HGB BLD-MCNC: 10.5 G/DL — LOW (ref 13–17)
MCHC RBC-ENTMCNC: 32.4 PG — SIGNIFICANT CHANGE UP (ref 27–34)
MCHC RBC-ENTMCNC: 34.4 GM/DL — SIGNIFICANT CHANGE UP (ref 32–36)
MCV RBC AUTO: 94.1 FL — SIGNIFICANT CHANGE UP (ref 80–100)
PLATELET # BLD AUTO: 151 K/UL — SIGNIFICANT CHANGE UP (ref 150–400)
POTASSIUM SERPL-MCNC: 3.5 MMOL/L — SIGNIFICANT CHANGE UP (ref 3.5–5.3)
POTASSIUM SERPL-SCNC: 3.5 MMOL/L — SIGNIFICANT CHANGE UP (ref 3.5–5.3)
RBC # BLD: 3.25 M/UL — LOW (ref 4.2–5.8)
RBC # FLD: 12.8 % — SIGNIFICANT CHANGE UP (ref 10.3–14.5)
SODIUM SERPL-SCNC: 147 MMOL/L — HIGH (ref 135–145)
WBC # BLD: 4.3 K/UL — SIGNIFICANT CHANGE UP (ref 3.8–10.5)
WBC # FLD AUTO: 4.3 K/UL — SIGNIFICANT CHANGE UP (ref 3.8–10.5)

## 2017-07-05 PROCEDURE — 93306 TTE W/DOPPLER COMPLETE: CPT | Mod: 26

## 2017-07-05 PROCEDURE — 99232 SBSQ HOSP IP/OBS MODERATE 35: CPT

## 2017-07-05 RX ORDER — KETOROLAC TROMETHAMINE 30 MG/ML
15 SYRINGE (ML) INJECTION ONCE
Qty: 0 | Refills: 0 | Status: DISCONTINUED | OUTPATIENT
Start: 2017-07-05 | End: 2017-07-06

## 2017-07-05 RX ORDER — ONDANSETRON 8 MG/1
4 TABLET, FILM COATED ORAL ONCE
Qty: 0 | Refills: 0 | Status: COMPLETED | OUTPATIENT
Start: 2017-07-05 | End: 2017-07-05

## 2017-07-05 RX ORDER — KETOROLAC TROMETHAMINE 30 MG/ML
15 SYRINGE (ML) INJECTION ONCE
Qty: 0 | Refills: 0 | Status: DISCONTINUED | OUTPATIENT
Start: 2017-07-05 | End: 2017-07-05

## 2017-07-05 RX ORDER — SODIUM CHLORIDE 9 MG/ML
1000 INJECTION INTRAMUSCULAR; INTRAVENOUS; SUBCUTANEOUS
Qty: 0 | Refills: 0 | Status: DISCONTINUED | OUTPATIENT
Start: 2017-07-05 | End: 2017-07-07

## 2017-07-05 RX ADMIN — Medication 75 MICROGRAM(S): at 05:39

## 2017-07-05 RX ADMIN — DIVALPROEX SODIUM 750 MILLIGRAM(S): 500 TABLET, DELAYED RELEASE ORAL at 05:38

## 2017-07-05 RX ADMIN — PIPERACILLIN AND TAZOBACTAM 25 GRAM(S): 4; .5 INJECTION, POWDER, LYOPHILIZED, FOR SOLUTION INTRAVENOUS at 15:04

## 2017-07-05 RX ADMIN — Medication 3 MILLILITER(S): at 12:08

## 2017-07-05 RX ADMIN — RISPERIDONE 0.25 MILLIGRAM(S): 4 TABLET ORAL at 18:30

## 2017-07-05 RX ADMIN — ENOXAPARIN SODIUM 40 MILLIGRAM(S): 100 INJECTION SUBCUTANEOUS at 12:08

## 2017-07-05 RX ADMIN — Medication 15 MILLIGRAM(S): at 07:55

## 2017-07-05 RX ADMIN — PIPERACILLIN AND TAZOBACTAM 25 GRAM(S): 4; .5 INJECTION, POWDER, LYOPHILIZED, FOR SOLUTION INTRAVENOUS at 21:18

## 2017-07-05 RX ADMIN — Medication 15 MILLIGRAM(S): at 07:38

## 2017-07-05 RX ADMIN — Medication 20 MILLIGRAM(S): at 18:30

## 2017-07-05 RX ADMIN — ONDANSETRON 4 MILLIGRAM(S): 8 TABLET, FILM COATED ORAL at 06:01

## 2017-07-05 RX ADMIN — Medication 3 MILLILITER(S): at 23:18

## 2017-07-05 RX ADMIN — PANTOPRAZOLE SODIUM 40 MILLIGRAM(S): 20 TABLET, DELAYED RELEASE ORAL at 05:39

## 2017-07-05 RX ADMIN — SODIUM CHLORIDE 50 MILLILITER(S): 9 INJECTION INTRAMUSCULAR; INTRAVENOUS; SUBCUTANEOUS at 09:00

## 2017-07-05 RX ADMIN — Medication 12.5 MILLIGRAM(S): at 06:02

## 2017-07-05 RX ADMIN — SIMVASTATIN 20 MILLIGRAM(S): 20 TABLET, FILM COATED ORAL at 21:18

## 2017-07-05 RX ADMIN — Medication 3 MILLILITER(S): at 05:38

## 2017-07-05 RX ADMIN — TAMSULOSIN HYDROCHLORIDE 0.4 MILLIGRAM(S): 0.4 CAPSULE ORAL at 21:18

## 2017-07-05 RX ADMIN — RISPERIDONE 0.25 MILLIGRAM(S): 4 TABLET ORAL at 05:39

## 2017-07-05 RX ADMIN — DIVALPROEX SODIUM 750 MILLIGRAM(S): 500 TABLET, DELAYED RELEASE ORAL at 18:30

## 2017-07-05 RX ADMIN — Medication 20 MILLIGRAM(S): at 12:08

## 2017-07-05 RX ADMIN — PIPERACILLIN AND TAZOBACTAM 25 GRAM(S): 4; .5 INJECTION, POWDER, LYOPHILIZED, FOR SOLUTION INTRAVENOUS at 05:38

## 2017-07-05 RX ADMIN — Medication 0.5 MILLIGRAM(S): at 12:08

## 2017-07-05 RX ADMIN — Medication 650 MILLIGRAM(S): at 18:40

## 2017-07-05 NOTE — PROGRESS NOTE ADULT - ASSESSMENT
PROSTATITIS, ON  ZOSYN PER  ID,     SEEN BY URO, NO INTERVENTION, PT  Afebrile   ON PSYCH  MEDS,  DVT  PPX.  ANEMIA  STABLE    FOLLOW  HYPONATREMIA

## 2017-07-05 NOTE — PROGRESS NOTE ADULT - ATTENDING COMMENTS
Pager [phone].  After 5pm or weekends call ID office at [phone].    I will be away as of 7/6/17.  ID service will follow.  Please call ID office at [phone] for issues.
Pager [phone].  After 5pm or weekends call ID office at [phone].

## 2017-07-05 NOTE — PROGRESS NOTE ADULT - ASSESSMENT
patient still complaing of pain.  bentyl for spasm.  appreciate all services.  improving diverticulitis no indication for surgery at this time per dr. santizo

## 2017-07-05 NOTE — PROGRESS NOTE ADULT - ASSESSMENT
57M with mental retardation ([facility]resident) here with recent diverticulitis exacerbation, now here with fever, found to have prostatitis on CT.  no urinary symptoms    1. looks clinically well, febrile x 24 hours, blood cultures negative, urine cultures with CoNS  2. change to bactrim 1DS bid x 1 month

## 2017-07-06 DIAGNOSIS — F39 UNSPECIFIED MOOD [AFFECTIVE] DISORDER: ICD-10-CM

## 2017-07-06 LAB
ANION GAP SERPL CALC-SCNC: 11 MMOL/L — SIGNIFICANT CHANGE UP (ref 5–17)
BUN SERPL-MCNC: 7 MG/DL — SIGNIFICANT CHANGE UP (ref 7–23)
CALCIUM SERPL-MCNC: 8.7 MG/DL — SIGNIFICANT CHANGE UP (ref 8.4–10.5)
CHLORIDE SERPL-SCNC: 106 MMOL/L — SIGNIFICANT CHANGE UP (ref 96–108)
CO2 SERPL-SCNC: 28 MMOL/L — SIGNIFICANT CHANGE UP (ref 22–31)
CREAT SERPL-MCNC: 0.86 MG/DL — SIGNIFICANT CHANGE UP (ref 0.5–1.3)
CULTURE RESULTS: SIGNIFICANT CHANGE UP
CULTURE RESULTS: SIGNIFICANT CHANGE UP
GLUCOSE SERPL-MCNC: 91 MG/DL — SIGNIFICANT CHANGE UP (ref 70–99)
HCT VFR BLD CALC: 34.3 % — LOW (ref 39–50)
HGB BLD-MCNC: 11 G/DL — LOW (ref 13–17)
MCHC RBC-ENTMCNC: 30.2 PG — SIGNIFICANT CHANGE UP (ref 27–34)
MCHC RBC-ENTMCNC: 32.1 GM/DL — SIGNIFICANT CHANGE UP (ref 32–36)
MCV RBC AUTO: 94 FL — SIGNIFICANT CHANGE UP (ref 80–100)
PLATELET # BLD AUTO: 173 K/UL — SIGNIFICANT CHANGE UP (ref 150–400)
POTASSIUM SERPL-MCNC: 3.8 MMOL/L — SIGNIFICANT CHANGE UP (ref 3.5–5.3)
POTASSIUM SERPL-SCNC: 3.8 MMOL/L — SIGNIFICANT CHANGE UP (ref 3.5–5.3)
RBC # BLD: 3.65 M/UL — LOW (ref 4.2–5.8)
RBC # FLD: 12.7 % — SIGNIFICANT CHANGE UP (ref 10.3–14.5)
SODIUM SERPL-SCNC: 145 MMOL/L — SIGNIFICANT CHANGE UP (ref 135–145)
SPECIMEN SOURCE: SIGNIFICANT CHANGE UP
SPECIMEN SOURCE: SIGNIFICANT CHANGE UP
WBC # BLD: 4.8 K/UL — SIGNIFICANT CHANGE UP (ref 3.8–10.5)
WBC # FLD AUTO: 4.8 K/UL — SIGNIFICANT CHANGE UP (ref 3.8–10.5)

## 2017-07-06 PROCEDURE — 99232 SBSQ HOSP IP/OBS MODERATE 35: CPT

## 2017-07-06 RX ORDER — ONDANSETRON 8 MG/1
4 TABLET, FILM COATED ORAL EVERY 6 HOURS
Qty: 0 | Refills: 0 | Status: DISCONTINUED | OUTPATIENT
Start: 2017-07-06 | End: 2017-07-10

## 2017-07-06 RX ORDER — RISPERIDONE 4 MG/1
1.5 TABLET ORAL
Qty: 0 | Refills: 0 | Status: DISCONTINUED | OUTPATIENT
Start: 2017-07-06 | End: 2017-07-10

## 2017-07-06 RX ORDER — ACETAMINOPHEN 500 MG
650 TABLET ORAL EVERY 6 HOURS
Qty: 0 | Refills: 0 | Status: DISCONTINUED | OUTPATIENT
Start: 2017-07-06 | End: 2017-07-10

## 2017-07-06 RX ADMIN — TAMSULOSIN HYDROCHLORIDE 0.4 MILLIGRAM(S): 0.4 CAPSULE ORAL at 21:14

## 2017-07-06 RX ADMIN — SIMVASTATIN 20 MILLIGRAM(S): 20 TABLET, FILM COATED ORAL at 21:14

## 2017-07-06 RX ADMIN — DIVALPROEX SODIUM 750 MILLIGRAM(S): 500 TABLET, DELAYED RELEASE ORAL at 17:32

## 2017-07-06 RX ADMIN — Medication 3 MILLILITER(S): at 05:04

## 2017-07-06 RX ADMIN — Medication 15 MILLIGRAM(S): at 04:09

## 2017-07-06 RX ADMIN — Medication 1 TABLET(S): at 21:14

## 2017-07-06 RX ADMIN — Medication 650 MILLIGRAM(S): at 15:15

## 2017-07-06 RX ADMIN — Medication 650 MILLIGRAM(S): at 21:43

## 2017-07-06 RX ADMIN — SODIUM CHLORIDE 50 MILLILITER(S): 9 INJECTION INTRAMUSCULAR; INTRAVENOUS; SUBCUTANEOUS at 18:07

## 2017-07-06 RX ADMIN — RISPERIDONE 0.25 MILLIGRAM(S): 4 TABLET ORAL at 05:05

## 2017-07-06 RX ADMIN — PIPERACILLIN AND TAZOBACTAM 25 GRAM(S): 4; .5 INJECTION, POWDER, LYOPHILIZED, FOR SOLUTION INTRAVENOUS at 05:05

## 2017-07-06 RX ADMIN — Medication 15 MILLIGRAM(S): at 04:39

## 2017-07-06 RX ADMIN — Medication 0.5 MILLIGRAM(S): at 11:07

## 2017-07-06 RX ADMIN — Medication 20 MILLIGRAM(S): at 05:04

## 2017-07-06 RX ADMIN — Medication 650 MILLIGRAM(S): at 21:13

## 2017-07-06 RX ADMIN — Medication 3 MILLILITER(S): at 23:12

## 2017-07-06 RX ADMIN — DIVALPROEX SODIUM 750 MILLIGRAM(S): 500 TABLET, DELAYED RELEASE ORAL at 05:05

## 2017-07-06 RX ADMIN — ONDANSETRON 4 MILLIGRAM(S): 8 TABLET, FILM COATED ORAL at 15:15

## 2017-07-06 RX ADMIN — ENOXAPARIN SODIUM 40 MILLIGRAM(S): 100 INJECTION SUBCUTANEOUS at 11:07

## 2017-07-06 RX ADMIN — Medication 650 MILLIGRAM(S): at 16:15

## 2017-07-06 RX ADMIN — Medication 3 MILLILITER(S): at 17:33

## 2017-07-06 RX ADMIN — RISPERIDONE 0.25 MILLIGRAM(S): 4 TABLET ORAL at 17:33

## 2017-07-06 RX ADMIN — Medication 75 MICROGRAM(S): at 05:05

## 2017-07-06 RX ADMIN — Medication 3 MILLILITER(S): at 11:07

## 2017-07-06 RX ADMIN — Medication 20 MILLIGRAM(S): at 16:51

## 2017-07-06 RX ADMIN — PANTOPRAZOLE SODIUM 40 MILLIGRAM(S): 20 TABLET, DELAYED RELEASE ORAL at 05:05

## 2017-07-06 NOTE — BEHAVIORAL HEALTH ASSESSMENT NOTE - NSBHCHARTREVIEWIMAGING_PSY_A_CORE FT
< from: CT Abdomen and Pelvis w/ IV Cont (07.02.17 @ 17:39) >    INTERPRETATION:  CLINICAL INFORMATION: Fevers, evaluate for occult source   of infection.  COMPARISON: CT abdomen and pelvis 6/25/2017, CT chest 7/1/2017  PROCEDURE:   CT of the Abdomen and Pelvis was performed with intravenous contrast.   Intravenous contrast: 90 ml Omnipaque 350. 10 ml discarded.  Oral contrast: None.  Sagittal and coronal reformats were performed.    FINDINGS:  LOWER CHEST: Small right pleural effusion. Bibasilar subsegmental   atelectasis.  LIVER: Subcentimeter hypodensity in the right lobe, too small to   characterize, but unchanged.  BILE DUCTS: Normal caliber.  GALLBLADDER: Within normal limits.  SPLEEN: Within normal limits.  PANCREAS: Within normal limits.  ADRENALS: Within normal limits.  KIDNEYS/URETERS: Within normal limits.  BLADDER: Mild infiltration of the perivesical fat.  REPRODUCTIVE ORGANS: The prostate is enlarged compared to prior CT   abdomenpelvis 6/25/2017, with adjacent inflammatory changes.  In   addition, there is a heterogeneous appearance of the prostate gland, with   ill-defined low-attenuation areas particularly along the right lateral   aspect of the prostate gland  BOWEL: Colonic diverticulosis. Previously seen inflammation adjacent to   the ascending colon has decreased. No bowel obstruction. Appendix not   visualized.  PERITONEUM: Trace ascites.  VESSELS:  Within normal limits.  RETROPERITONEUM: No lymphadenopathy.    ABDOMINAL WALL: Lipoma within the left inferior inferolateral abdominal   wall musculature again noted. Diffuse soft tissue edema. Small bilateral   fat-containing inguinal hernias.  BONES: Unchanged left femoral neck bone island. Mild degenerative changes   in the spine.    IMPRESSION:   Acutely enlarged prostate gland with mild surrounding inflammation,   concerning for prostatitis. In addition, there is a heterogeneous   appearance of the prostate gland with ill-defined low-attenuation areas,   raising consideration of focal abscesses. Mild infiltration of the   perivesical fat.  Interval decrease in the previously seen ascending colonic diverticula
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
## 2017-07-06 NOTE — BEHAVIORAL HEALTH ASSESSMENT NOTE - NSBHCONSULTMEDS_PSY_A_CORE FT
Meds per records from nursing home:  Risperidone 1.5 mg bid  Depakote 750 mg bid  Cogentin 0.5 mg/d    Discussed current incorrect order for Risperidone with medical team, please continue as above

## 2017-07-06 NOTE — BEHAVIORAL HEALTH ASSESSMENT NOTE - CASE SUMMARY
56 y/o male with H/O Mental retardation, Bipolar Disorder as per records, hypothyroidism, normal pressure hydrocephalus s/p drainage x2, Parkinson's disease vs drug-induced movement disorder brought in from facility for fever.  Patient displays no gross mood abnormalities. No anxiety or psychosis symptoms reported or observed. Psychiatrically stable and at baseline. can continue all above current medications as written.  Appropriate for discharge back to facility when medically cleared. 58 y/o male with H/O Mental retardation, Bipolar Disorder as per records, hypothyroidism, normal pressure hydrocephalus s/p drainage x2, Parkinson's disease vs drug-induced movement disorder brought in from facility for fever.  Patient displays no gross mood abnormalities. No anxiety or psychosis symptoms reported or observed. Psychiatrically stable and at baseline. can continue all above current medications as written. would consider switching from Risperidal to Seroquel if Parkinson's is suspected, would defer to primary psychiatrist.   Appropriate for discharge back to facility when medically cleared. 58 y/o male with H/O Mental retardation, Bipolar Disorder as per records, hypothyroidism, normal pressure hydrocephalus s/p drainage x2, Parkinson's disease vs drug-induced movement disorder brought in from facility for fever.  Patient displays no gross mood abnormalities. No anxiety or psychosis symptoms reported or observed. no suicidal/homicidal ideation/intent or plan. Psychiatrically stable and at baseline. can continue all above current medications as written. would consider switching from Risperidal to Seroquel if Parkinson's is suspected, would defer to primary psychiatrist.   Appropriate for discharge back to facility when medically cleared.

## 2017-07-06 NOTE — BEHAVIORAL HEALTH ASSESSMENT NOTE - NSBHCHARTREVIEWLAB_PSY_A_CORE FT
CBC Full  -  ( 06 Jul 2017 08:05 )  WBC Count : 4.8 K/uL  Hemoglobin : 11.0 g/dL  Hematocrit : 34.3 %  Platelet Count - Automated : 173 K/uL  Mean Cell Volume : 94.0 fl  Mean Cell Hemoglobin : 30.2 pg  Mean Cell Hemoglobin Concentration : 32.1 gm/dL  Auto Neutrophil # : x  Auto Lymphocyte # : x  Auto Monocyte # : x  Auto Eosinophil # : x  Auto Basophil # : x  Auto Neutrophil % : x  Auto Lymphocyte % : x  Auto Monocyte % : x  Auto Eosinophil % : x  Auto Basophil % : x    07-06    145  |  106  |  7   ----------------------------<  91  3.8   |  28  |  0.86    Ca    8.7      06 Jul 2017 08:06

## 2017-07-06 NOTE — BEHAVIORAL HEALTH ASSESSMENT NOTE - SUMMARY
56 y/o male with H/O Mental retardation, Bipolar Disorder, hypothyroidism, normal pressure hydrocephalus s/p drainage x2, Parkinson's disease vs drug-induced movement disorder brought in from facility for fever.    Patient displays no gross mood abnormalities. No anxiety or psychosis symptoms reported or observed. Psychiatrically stable and at baseline. Appropriate for discharge back to facility when medically cleared.

## 2017-07-06 NOTE — PROGRESS NOTE ADULT - ASSESSMENT
57M with mental retardation ([facility]resident) here with recent diverticulitis exacerbation, now here with fever, found to have prostatitis on CT.  no urinary symptoms  No PNA on CT  s/p zosyn since admiddion  Can switch to Po bactrim(as detailed in Dr lara note 7/5)  Other plan per primary team  To follow with Dr lara in ID clinic 3-4 weeks  ID will follow as needed during hospital stay,please call [phone] if any questions or issues.      1. looks clinically well, febrile x 24 hours, blood cultures negative, urine cultures with CoNS  2. change to bactrim 1DS bid x 1 month

## 2017-07-06 NOTE — PROGRESS NOTE ADULT - ASSESSMENT
dementia    PROSTATITIS, ON ZOSYN,   DVT  PPX;  LABS  PENDING    FOLLOW  STAT  SODIUM AND  IN  AM,  HYPERNATREMIA  NOTED
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
## 2017-07-06 NOTE — BEHAVIORAL HEALTH ASSESSMENT NOTE - NSBHCHARTREVIEWINVESTIGATE_PSY_A_CORE FT
< from: 12 Lead ECG (07.01.17 @ 12:16) >      Ventricular Rate 110 BPM    Atrial Rate 110 BPM    P-R Interval 168 ms    QRS Duration 86 ms     ms    QTc 433 ms    P Axis 34 degrees    R Axis 18 degrees    T Axis 1 degrees    Diagnosis Line SINUS TACHYCARDIA  INFERIOR INFARCT , AGE UNDETERMINED  CANNOT RULE OUT ANTERIOR INFARCT , AGE UNDETERMINED  ABNORMAL ECG

## 2017-07-06 NOTE — BEHAVIORAL HEALTH ASSESSMENT NOTE - NSBHCHARTREVIEWVS_PSY_A_CORE FT
T(C): 36.5 (07-06-17 @ 16:19), Max: 36.7 (07-06-17 @ 03:00)  HR: 63 (07-06-17 @ 16:19) (62 - 86)  BP: 117/76 (07-06-17 @ 16:19) (114/72 - 128/64)  RR: 18 (07-06-17 @ 16:19) (18 - 18)  SpO2: 100% (07-06-17 @ 16:19) (97% - 100%)

## 2017-07-06 NOTE — BEHAVIORAL HEALTH ASSESSMENT NOTE - NSBHSUICPROTECTFACT_PSY_A_CORE
Supportive social network or family/Ability to cope with stress/Fear of death or dying due to pain/suffering/Positive therapeutic relationships/Identifies reasons for living

## 2017-07-07 DIAGNOSIS — F31.9 BIPOLAR DISORDER, UNSPECIFIED: ICD-10-CM

## 2017-07-07 LAB
ANION GAP SERPL CALC-SCNC: 10 MMOL/L — SIGNIFICANT CHANGE UP (ref 5–17)
BUN SERPL-MCNC: 6 MG/DL — LOW (ref 7–23)
CALCIUM SERPL-MCNC: 9.1 MG/DL — SIGNIFICANT CHANGE UP (ref 8.4–10.5)
CHLORIDE SERPL-SCNC: 109 MMOL/L — HIGH (ref 96–108)
CO2 SERPL-SCNC: 28 MMOL/L — SIGNIFICANT CHANGE UP (ref 22–31)
CREAT SERPL-MCNC: 0.95 MG/DL — SIGNIFICANT CHANGE UP (ref 0.5–1.3)
GLUCOSE SERPL-MCNC: 87 MG/DL — SIGNIFICANT CHANGE UP (ref 70–99)
HCT VFR BLD CALC: 34.7 % — LOW (ref 39–50)
HGB BLD-MCNC: 11.8 G/DL — LOW (ref 13–17)
MCHC RBC-ENTMCNC: 31.9 PG — SIGNIFICANT CHANGE UP (ref 27–34)
MCHC RBC-ENTMCNC: 33.9 GM/DL — SIGNIFICANT CHANGE UP (ref 32–36)
MCV RBC AUTO: 94.2 FL — SIGNIFICANT CHANGE UP (ref 80–100)
PLATELET # BLD AUTO: 207 K/UL — SIGNIFICANT CHANGE UP (ref 150–400)
POTASSIUM SERPL-MCNC: 3.8 MMOL/L — SIGNIFICANT CHANGE UP (ref 3.5–5.3)
POTASSIUM SERPL-SCNC: 3.8 MMOL/L — SIGNIFICANT CHANGE UP (ref 3.5–5.3)
RBC # BLD: 3.69 M/UL — LOW (ref 4.2–5.8)
RBC # FLD: 12.9 % — SIGNIFICANT CHANGE UP (ref 10.3–14.5)
SODIUM SERPL-SCNC: 147 MMOL/L — HIGH (ref 135–145)
WBC # BLD: 5 K/UL — SIGNIFICANT CHANGE UP (ref 3.8–10.5)
WBC # FLD AUTO: 5 K/UL — SIGNIFICANT CHANGE UP (ref 3.8–10.5)

## 2017-07-07 PROCEDURE — 99222 1ST HOSP IP/OBS MODERATE 55: CPT

## 2017-07-07 RX ORDER — SODIUM CHLORIDE 9 MG/ML
1000 INJECTION, SOLUTION INTRAVENOUS
Qty: 0 | Refills: 0 | Status: DISCONTINUED | OUTPATIENT
Start: 2017-07-07 | End: 2017-07-08

## 2017-07-07 RX ADMIN — DIVALPROEX SODIUM 750 MILLIGRAM(S): 500 TABLET, DELAYED RELEASE ORAL at 06:26

## 2017-07-07 RX ADMIN — PANTOPRAZOLE SODIUM 40 MILLIGRAM(S): 20 TABLET, DELAYED RELEASE ORAL at 06:26

## 2017-07-07 RX ADMIN — TAMSULOSIN HYDROCHLORIDE 0.4 MILLIGRAM(S): 0.4 CAPSULE ORAL at 22:12

## 2017-07-07 RX ADMIN — SIMVASTATIN 20 MILLIGRAM(S): 20 TABLET, FILM COATED ORAL at 22:12

## 2017-07-07 RX ADMIN — Medication 650 MILLIGRAM(S): at 22:12

## 2017-07-07 RX ADMIN — Medication 20 MILLIGRAM(S): at 06:26

## 2017-07-07 RX ADMIN — ONDANSETRON 4 MILLIGRAM(S): 8 TABLET, FILM COATED ORAL at 03:49

## 2017-07-07 RX ADMIN — Medication 650 MILLIGRAM(S): at 09:58

## 2017-07-07 RX ADMIN — Medication 650 MILLIGRAM(S): at 04:18

## 2017-07-07 RX ADMIN — ONDANSETRON 4 MILLIGRAM(S): 8 TABLET, FILM COATED ORAL at 09:58

## 2017-07-07 RX ADMIN — Medication 650 MILLIGRAM(S): at 23:12

## 2017-07-07 RX ADMIN — SODIUM CHLORIDE 60 MILLILITER(S): 9 INJECTION, SOLUTION INTRAVENOUS at 11:31

## 2017-07-07 RX ADMIN — RISPERIDONE 1.5 MILLIGRAM(S): 4 TABLET ORAL at 06:26

## 2017-07-07 RX ADMIN — Medication 1 TABLET(S): at 17:44

## 2017-07-07 RX ADMIN — Medication 1 TABLET(S): at 06:27

## 2017-07-07 RX ADMIN — Medication 3 MILLILITER(S): at 06:25

## 2017-07-07 RX ADMIN — Medication 650 MILLIGRAM(S): at 10:58

## 2017-07-07 RX ADMIN — ENOXAPARIN SODIUM 40 MILLIGRAM(S): 100 INJECTION SUBCUTANEOUS at 11:22

## 2017-07-07 RX ADMIN — SENNA PLUS 2 TABLET(S): 8.6 TABLET ORAL at 22:12

## 2017-07-07 RX ADMIN — RISPERIDONE 1.5 MILLIGRAM(S): 4 TABLET ORAL at 17:44

## 2017-07-07 RX ADMIN — ONDANSETRON 4 MILLIGRAM(S): 8 TABLET, FILM COATED ORAL at 22:13

## 2017-07-07 RX ADMIN — Medication 20 MILLIGRAM(S): at 16:00

## 2017-07-07 RX ADMIN — Medication 3 MILLILITER(S): at 23:14

## 2017-07-07 RX ADMIN — DIVALPROEX SODIUM 750 MILLIGRAM(S): 500 TABLET, DELAYED RELEASE ORAL at 17:44

## 2017-07-07 RX ADMIN — Medication 75 MICROGRAM(S): at 06:26

## 2017-07-07 RX ADMIN — Medication 3 MILLILITER(S): at 11:25

## 2017-07-07 RX ADMIN — SODIUM CHLORIDE 60 MILLILITER(S): 9 INJECTION, SOLUTION INTRAVENOUS at 22:16

## 2017-07-07 RX ADMIN — Medication 0.5 MILLIGRAM(S): at 11:22

## 2017-07-07 RX ADMIN — Medication 650 MILLIGRAM(S): at 03:48

## 2017-07-07 NOTE — PROGRESS NOTE ADULT - ASSESSMENT
sepsis  from prostatitis  resolving, ct chest  with  no  pna   on iv  ab,  pt  doing better,  dvt  ppx, sepsis  from prostatitis  resolving, ct chest  with  no  pna   on iv  ab,  pt  doing better,  dvt  ppx, ,  hypernatremia,  on iv d5,  folow  bmp  in  am

## 2017-07-07 NOTE — PROGRESS NOTE ADULT - ASSESSMENT
Clinically resolving diverticulitis, unclear why he has nausea while eating. Unrelated to abx last time. Discussed possible EGD prior to d/c.  Abd exam benign. No indication for urgent surgery at this time.

## 2017-07-08 LAB
ANION GAP SERPL CALC-SCNC: 12 MMOL/L — SIGNIFICANT CHANGE UP (ref 5–17)
BUN SERPL-MCNC: 8 MG/DL — SIGNIFICANT CHANGE UP (ref 7–23)
CALCIUM SERPL-MCNC: 9.2 MG/DL — SIGNIFICANT CHANGE UP (ref 8.4–10.5)
CHLORIDE SERPL-SCNC: 107 MMOL/L — SIGNIFICANT CHANGE UP (ref 96–108)
CO2 SERPL-SCNC: 26 MMOL/L — SIGNIFICANT CHANGE UP (ref 22–31)
CREAT SERPL-MCNC: 1.07 MG/DL — SIGNIFICANT CHANGE UP (ref 0.5–1.3)
GLUCOSE SERPL-MCNC: 90 MG/DL — SIGNIFICANT CHANGE UP (ref 70–99)
POTASSIUM SERPL-MCNC: 4 MMOL/L — SIGNIFICANT CHANGE UP (ref 3.5–5.3)
POTASSIUM SERPL-SCNC: 4 MMOL/L — SIGNIFICANT CHANGE UP (ref 3.5–5.3)
SODIUM SERPL-SCNC: 145 MMOL/L — SIGNIFICANT CHANGE UP (ref 135–145)

## 2017-07-08 RX ORDER — ONDANSETRON 8 MG/1
4 TABLET, FILM COATED ORAL ONCE
Qty: 0 | Refills: 0 | Status: COMPLETED | OUTPATIENT
Start: 2017-07-08 | End: 2017-07-08

## 2017-07-08 RX ORDER — DOCUSATE SODIUM 100 MG
100 CAPSULE ORAL
Qty: 0 | Refills: 0 | Status: DISCONTINUED | OUTPATIENT
Start: 2017-07-08 | End: 2017-07-10

## 2017-07-08 RX ORDER — SODIUM CHLORIDE 9 MG/ML
1000 INJECTION, SOLUTION INTRAVENOUS
Qty: 0 | Refills: 0 | Status: DISCONTINUED | OUTPATIENT
Start: 2017-07-08 | End: 2017-07-10

## 2017-07-08 RX ADMIN — Medication 1 TABLET(S): at 06:02

## 2017-07-08 RX ADMIN — Medication 3 MILLILITER(S): at 17:40

## 2017-07-08 RX ADMIN — ONDANSETRON 4 MILLIGRAM(S): 8 TABLET, FILM COATED ORAL at 19:42

## 2017-07-08 RX ADMIN — SIMVASTATIN 20 MILLIGRAM(S): 20 TABLET, FILM COATED ORAL at 21:46

## 2017-07-08 RX ADMIN — ONDANSETRON 4 MILLIGRAM(S): 8 TABLET, FILM COATED ORAL at 09:35

## 2017-07-08 RX ADMIN — DIVALPROEX SODIUM 750 MILLIGRAM(S): 500 TABLET, DELAYED RELEASE ORAL at 06:02

## 2017-07-08 RX ADMIN — Medication 20 MILLIGRAM(S): at 17:40

## 2017-07-08 RX ADMIN — Medication 650 MILLIGRAM(S): at 15:31

## 2017-07-08 RX ADMIN — Medication 650 MILLIGRAM(S): at 07:46

## 2017-07-08 RX ADMIN — Medication 3 MILLILITER(S): at 06:02

## 2017-07-08 RX ADMIN — RISPERIDONE 1.5 MILLIGRAM(S): 4 TABLET ORAL at 06:02

## 2017-07-08 RX ADMIN — Medication 0.5 MILLIGRAM(S): at 11:45

## 2017-07-08 RX ADMIN — Medication 1 TABLET(S): at 17:39

## 2017-07-08 RX ADMIN — Medication 75 MICROGRAM(S): at 06:02

## 2017-07-08 RX ADMIN — Medication 100 MILLIGRAM(S): at 17:41

## 2017-07-08 RX ADMIN — Medication 650 MILLIGRAM(S): at 14:46

## 2017-07-08 RX ADMIN — Medication 650 MILLIGRAM(S): at 22:43

## 2017-07-08 RX ADMIN — Medication 20 MILLIGRAM(S): at 06:02

## 2017-07-08 RX ADMIN — Medication 3 MILLILITER(S): at 11:45

## 2017-07-08 RX ADMIN — TAMSULOSIN HYDROCHLORIDE 0.4 MILLIGRAM(S): 0.4 CAPSULE ORAL at 21:46

## 2017-07-08 RX ADMIN — DIVALPROEX SODIUM 750 MILLIGRAM(S): 500 TABLET, DELAYED RELEASE ORAL at 17:38

## 2017-07-08 RX ADMIN — SODIUM CHLORIDE 60 MILLILITER(S): 9 INJECTION, SOLUTION INTRAVENOUS at 13:37

## 2017-07-08 RX ADMIN — Medication 650 MILLIGRAM(S): at 10:00

## 2017-07-08 RX ADMIN — PANTOPRAZOLE SODIUM 40 MILLIGRAM(S): 20 TABLET, DELAYED RELEASE ORAL at 06:02

## 2017-07-08 RX ADMIN — Medication 3 MILLILITER(S): at 23:42

## 2017-07-08 RX ADMIN — ONDANSETRON 4 MILLIGRAM(S): 8 TABLET, FILM COATED ORAL at 13:36

## 2017-07-08 RX ADMIN — RISPERIDONE 1.5 MILLIGRAM(S): 4 TABLET ORAL at 17:39

## 2017-07-08 RX ADMIN — Medication 650 MILLIGRAM(S): at 21:48

## 2017-07-08 RX ADMIN — SODIUM CHLORIDE 60 MILLILITER(S): 9 INJECTION, SOLUTION INTRAVENOUS at 21:47

## 2017-07-08 RX ADMIN — ENOXAPARIN SODIUM 40 MILLIGRAM(S): 100 INJECTION SUBCUTANEOUS at 11:45

## 2017-07-08 NOTE — PROGRESS NOTE ADULT - ASSESSMENT
Objective improvement but still c/o pain and post prandial nausea. Would consider repeat CT although have low suspicion that diverticulitis hasn't improved.  Readdress EGD to evaluate UGI symptoms.

## 2017-07-08 NOTE — PROGRESS NOTE ADULT - ASSESSMENT
prostatiTIS  doing  better   ON PO  BACTRIM FOR  1 MONTH. PER  ID    HYPERNATREMIA, ON IV FLUIDS,  AWAITING  BMP  TODAY   PLAN,  DC TO  REHAB  ON  [facility]

## 2017-07-09 LAB
ANION GAP SERPL CALC-SCNC: 12 MMOL/L — SIGNIFICANT CHANGE UP (ref 5–17)
BUN SERPL-MCNC: 10 MG/DL — SIGNIFICANT CHANGE UP (ref 7–23)
CALCIUM SERPL-MCNC: 9.5 MG/DL — SIGNIFICANT CHANGE UP (ref 8.4–10.5)
CHLORIDE SERPL-SCNC: 105 MMOL/L — SIGNIFICANT CHANGE UP (ref 96–108)
CO2 SERPL-SCNC: 25 MMOL/L — SIGNIFICANT CHANGE UP (ref 22–31)
CREAT SERPL-MCNC: 1.05 MG/DL — SIGNIFICANT CHANGE UP (ref 0.5–1.3)
GLUCOSE SERPL-MCNC: 78 MG/DL — SIGNIFICANT CHANGE UP (ref 70–99)
POTASSIUM SERPL-MCNC: 3.5 MMOL/L — SIGNIFICANT CHANGE UP (ref 3.5–5.3)
POTASSIUM SERPL-SCNC: 3.5 MMOL/L — SIGNIFICANT CHANGE UP (ref 3.5–5.3)
SODIUM SERPL-SCNC: 142 MMOL/L — SIGNIFICANT CHANGE UP (ref 135–145)

## 2017-07-09 PROCEDURE — 74177 CT ABD & PELVIS W/CONTRAST: CPT | Mod: 26

## 2017-07-09 RX ORDER — POLYETHYLENE GLYCOL 3350 17 G/17G
17 POWDER, FOR SOLUTION ORAL DAILY
Qty: 0 | Refills: 0 | Status: DISCONTINUED | OUTPATIENT
Start: 2017-07-09 | End: 2017-07-10

## 2017-07-09 RX ADMIN — Medication 3 MILLILITER(S): at 11:15

## 2017-07-09 RX ADMIN — POLYETHYLENE GLYCOL 3350 17 GRAM(S): 17 POWDER, FOR SOLUTION ORAL at 11:12

## 2017-07-09 RX ADMIN — DIVALPROEX SODIUM 750 MILLIGRAM(S): 500 TABLET, DELAYED RELEASE ORAL at 05:52

## 2017-07-09 RX ADMIN — SIMVASTATIN 20 MILLIGRAM(S): 20 TABLET, FILM COATED ORAL at 21:18

## 2017-07-09 RX ADMIN — ONDANSETRON 4 MILLIGRAM(S): 8 TABLET, FILM COATED ORAL at 13:40

## 2017-07-09 RX ADMIN — ONDANSETRON 4 MILLIGRAM(S): 8 TABLET, FILM COATED ORAL at 03:36

## 2017-07-09 RX ADMIN — Medication 20 MILLIGRAM(S): at 05:57

## 2017-07-09 RX ADMIN — Medication 100 MILLIGRAM(S): at 17:15

## 2017-07-09 RX ADMIN — Medication 650 MILLIGRAM(S): at 04:27

## 2017-07-09 RX ADMIN — Medication 650 MILLIGRAM(S): at 03:37

## 2017-07-09 RX ADMIN — DIVALPROEX SODIUM 750 MILLIGRAM(S): 500 TABLET, DELAYED RELEASE ORAL at 17:16

## 2017-07-09 RX ADMIN — Medication 3 MILLILITER(S): at 17:16

## 2017-07-09 RX ADMIN — RISPERIDONE 1.5 MILLIGRAM(S): 4 TABLET ORAL at 17:19

## 2017-07-09 RX ADMIN — SODIUM CHLORIDE 60 MILLILITER(S): 9 INJECTION, SOLUTION INTRAVENOUS at 21:18

## 2017-07-09 RX ADMIN — Medication 3 MILLILITER(S): at 05:52

## 2017-07-09 RX ADMIN — Medication 1 TABLET(S): at 05:54

## 2017-07-09 RX ADMIN — ENOXAPARIN SODIUM 40 MILLIGRAM(S): 100 INJECTION SUBCUTANEOUS at 11:11

## 2017-07-09 RX ADMIN — Medication 650 MILLIGRAM(S): at 21:53

## 2017-07-09 RX ADMIN — Medication 100 MILLIGRAM(S): at 05:53

## 2017-07-09 RX ADMIN — Medication 650 MILLIGRAM(S): at 22:50

## 2017-07-09 RX ADMIN — Medication 650 MILLIGRAM(S): at 11:15

## 2017-07-09 RX ADMIN — Medication 20 MILLIGRAM(S): at 17:16

## 2017-07-09 RX ADMIN — RISPERIDONE 1.5 MILLIGRAM(S): 4 TABLET ORAL at 05:56

## 2017-07-09 RX ADMIN — Medication 75 MICROGRAM(S): at 05:54

## 2017-07-09 RX ADMIN — PANTOPRAZOLE SODIUM 40 MILLIGRAM(S): 20 TABLET, DELAYED RELEASE ORAL at 05:57

## 2017-07-09 RX ADMIN — TAMSULOSIN HYDROCHLORIDE 0.4 MILLIGRAM(S): 0.4 CAPSULE ORAL at 21:18

## 2017-07-09 RX ADMIN — Medication 3 MILLILITER(S): at 23:15

## 2017-07-09 RX ADMIN — Medication 0.5 MILLIGRAM(S): at 11:11

## 2017-07-09 RX ADMIN — ONDANSETRON 4 MILLIGRAM(S): 8 TABLET, FILM COATED ORAL at 21:56

## 2017-07-09 RX ADMIN — Medication 1 TABLET(S): at 17:15

## 2017-07-09 NOTE — PROGRESS NOTE ADULT - ASSESSMENT
PT  WITH  PROSTATITIS, ON PO  BACTRIM  PER ID    C/C  ABD PAIN PER  PT,   ABDOMINAL  EXAM HAS  ALWAYS  BEEN  NON TENDER, PT  APPEARS  VERY  COMFORTABLE,  HAS  H/O  PSYCHOSES,  ALSO  SEEN BY  GI,   RE ITERATED  AGAIN, THAT  PT DOES  NOT NEED  ANY  GI  W/P  AT  THIS  TIME   PT  HAS  NO  NAUSEA/  VOMITING/  DIARRHAEA/  ABD TENDERNESS    DC  TO  REHAB  ION  AM    FORMS  FILLED  BY  PSYCH,  FOR TRANSFER TO REHAB

## 2017-07-10 ENCOUNTER — TRANSCRIPTION ENCOUNTER (OUTPATIENT)
Age: 57
End: 2017-07-10

## 2017-07-10 VITALS
RESPIRATION RATE: 18 BRPM | SYSTOLIC BLOOD PRESSURE: 104 MMHG | TEMPERATURE: 98 F | HEART RATE: 74 BPM | OXYGEN SATURATION: 94 % | DIASTOLIC BLOOD PRESSURE: 66 MMHG

## 2017-07-10 PROCEDURE — 93306 TTE W/DOPPLER COMPLETE: CPT

## 2017-07-10 PROCEDURE — 84295 ASSAY OF SERUM SODIUM: CPT

## 2017-07-10 PROCEDURE — 87486 CHLMYD PNEUM DNA AMP PROBE: CPT

## 2017-07-10 PROCEDURE — 83605 ASSAY OF LACTIC ACID: CPT

## 2017-07-10 PROCEDURE — 96374 THER/PROPH/DIAG INJ IV PUSH: CPT

## 2017-07-10 PROCEDURE — 97161 PT EVAL LOW COMPLEX 20 MIN: CPT

## 2017-07-10 PROCEDURE — 87086 URINE CULTURE/COLONY COUNT: CPT

## 2017-07-10 PROCEDURE — 82330 ASSAY OF CALCIUM: CPT

## 2017-07-10 PROCEDURE — 87581 M.PNEUMON DNA AMP PROBE: CPT

## 2017-07-10 PROCEDURE — 71250 CT THORAX DX C-: CPT

## 2017-07-10 PROCEDURE — 84484 ASSAY OF TROPONIN QUANT: CPT

## 2017-07-10 PROCEDURE — 93005 ELECTROCARDIOGRAM TRACING: CPT

## 2017-07-10 PROCEDURE — 71045 X-RAY EXAM CHEST 1 VIEW: CPT

## 2017-07-10 PROCEDURE — 82803 BLOOD GASES ANY COMBINATION: CPT

## 2017-07-10 PROCEDURE — 85014 HEMATOCRIT: CPT

## 2017-07-10 PROCEDURE — 85610 PROTHROMBIN TIME: CPT

## 2017-07-10 PROCEDURE — 94640 AIRWAY INHALATION TREATMENT: CPT

## 2017-07-10 PROCEDURE — 87798 DETECT AGENT NOS DNA AMP: CPT

## 2017-07-10 PROCEDURE — 85730 THROMBOPLASTIN TIME PARTIAL: CPT

## 2017-07-10 PROCEDURE — 80048 BASIC METABOLIC PNL TOTAL CA: CPT

## 2017-07-10 PROCEDURE — 97530 THERAPEUTIC ACTIVITIES: CPT

## 2017-07-10 PROCEDURE — 85027 COMPLETE CBC AUTOMATED: CPT

## 2017-07-10 PROCEDURE — 96375 TX/PRO/DX INJ NEW DRUG ADDON: CPT

## 2017-07-10 PROCEDURE — 87040 BLOOD CULTURE FOR BACTERIA: CPT

## 2017-07-10 PROCEDURE — 82435 ASSAY OF BLOOD CHLORIDE: CPT

## 2017-07-10 PROCEDURE — 99285 EMERGENCY DEPT VISIT HI MDM: CPT | Mod: 25

## 2017-07-10 PROCEDURE — 84132 ASSAY OF SERUM POTASSIUM: CPT

## 2017-07-10 PROCEDURE — 82947 ASSAY GLUCOSE BLOOD QUANT: CPT

## 2017-07-10 PROCEDURE — 74177 CT ABD & PELVIS W/CONTRAST: CPT

## 2017-07-10 PROCEDURE — 80053 COMPREHEN METABOLIC PANEL: CPT

## 2017-07-10 PROCEDURE — 87633 RESP VIRUS 12-25 TARGETS: CPT

## 2017-07-10 PROCEDURE — 81001 URINALYSIS AUTO W/SCOPE: CPT

## 2017-07-10 PROCEDURE — 97116 GAIT TRAINING THERAPY: CPT

## 2017-07-10 RX ORDER — IBUPROFEN 200 MG
1 TABLET ORAL
Qty: 0 | Refills: 0 | COMMUNITY

## 2017-07-10 RX ORDER — MULTIVIT WITH MIN/MFOLATE/K2 340-15/3 G
300 POWDER (GRAM) ORAL
Qty: 0 | Refills: 0 | COMMUNITY

## 2017-07-10 RX ORDER — ALBUTEROL 90 UG/1
1 AEROSOL, METERED ORAL
Qty: 0 | Refills: 0 | DISCHARGE
Start: 2017-07-10

## 2017-07-10 RX ORDER — ACETAMINOPHEN 500 MG
2 TABLET ORAL
Qty: 0 | Refills: 0 | DISCHARGE
Start: 2017-07-10

## 2017-07-10 RX ORDER — ENOXAPARIN SODIUM 100 MG/ML
40 INJECTION SUBCUTANEOUS
Qty: 0 | Refills: 0 | DISCHARGE
Start: 2017-07-10

## 2017-07-10 RX ORDER — MECLIZINE HCL 12.5 MG
2 TABLET ORAL
Qty: 0 | Refills: 0 | COMMUNITY

## 2017-07-10 RX ORDER — PANTOPRAZOLE SODIUM 20 MG/1
1 TABLET, DELAYED RELEASE ORAL
Qty: 0 | Refills: 0 | DISCHARGE
Start: 2017-07-10

## 2017-07-10 RX ORDER — BENZTROPINE MESYLATE 1 MG
1 TABLET ORAL
Qty: 0 | Refills: 0 | COMMUNITY
Start: 2017-07-10

## 2017-07-10 RX ORDER — TIOTROPIUM BROMIDE 18 UG/1
1 CAPSULE ORAL; RESPIRATORY (INHALATION)
Qty: 0 | Refills: 0 | DISCHARGE
Start: 2017-07-10

## 2017-07-10 RX ORDER — LEVOTHYROXINE SODIUM 125 MCG
1 TABLET ORAL
Qty: 0 | Refills: 0 | COMMUNITY
Start: 2017-07-10

## 2017-07-10 RX ORDER — DIVALPROEX SODIUM 500 MG/1
3 TABLET, DELAYED RELEASE ORAL
Qty: 0 | Refills: 0 | DISCHARGE
Start: 2017-07-10

## 2017-07-10 RX ORDER — POLYETHYLENE GLYCOL 3350 17 G/17G
17 POWDER, FOR SOLUTION ORAL
Qty: 0 | Refills: 0 | COMMUNITY
Start: 2017-07-10

## 2017-07-10 RX ORDER — RISPERIDONE 4 MG/1
3 TABLET ORAL
Qty: 0 | Refills: 0 | COMMUNITY
Start: 2017-07-10

## 2017-07-10 RX ORDER — TAMSULOSIN HYDROCHLORIDE 0.4 MG/1
1 CAPSULE ORAL
Qty: 0 | Refills: 0 | COMMUNITY
Start: 2017-07-10

## 2017-07-10 RX ORDER — SENNA PLUS 8.6 MG/1
2 TABLET ORAL
Qty: 0 | Refills: 0 | COMMUNITY
Start: 2017-07-10

## 2017-07-10 RX ORDER — IPRATROPIUM/ALBUTEROL SULFATE 18-103MCG
3 AEROSOL WITH ADAPTER (GRAM) INHALATION
Qty: 0 | Refills: 0 | DISCHARGE
Start: 2017-07-10

## 2017-07-10 RX ORDER — SIMVASTATIN 20 MG/1
1 TABLET, FILM COATED ORAL
Qty: 0 | Refills: 0 | COMMUNITY
Start: 2017-07-10

## 2017-07-10 RX ORDER — DOCUSATE SODIUM 100 MG
1 CAPSULE ORAL
Qty: 0 | Refills: 0 | COMMUNITY
Start: 2017-07-10

## 2017-07-10 RX ORDER — MECLIZINE HCL 12.5 MG
1 TABLET ORAL
Qty: 0 | Refills: 0 | COMMUNITY

## 2017-07-10 RX ADMIN — DIVALPROEX SODIUM 750 MILLIGRAM(S): 500 TABLET, DELAYED RELEASE ORAL at 06:23

## 2017-07-10 RX ADMIN — Medication 100 MILLIGRAM(S): at 06:23

## 2017-07-10 RX ADMIN — POLYETHYLENE GLYCOL 3350 17 GRAM(S): 17 POWDER, FOR SOLUTION ORAL at 11:34

## 2017-07-10 RX ADMIN — Medication 650 MILLIGRAM(S): at 03:09

## 2017-07-10 RX ADMIN — Medication 650 MILLIGRAM(S): at 04:05

## 2017-07-10 RX ADMIN — ENOXAPARIN SODIUM 40 MILLIGRAM(S): 100 INJECTION SUBCUTANEOUS at 11:29

## 2017-07-10 RX ADMIN — Medication 3 MILLILITER(S): at 11:29

## 2017-07-10 RX ADMIN — Medication 75 MICROGRAM(S): at 06:23

## 2017-07-10 RX ADMIN — Medication 650 MILLIGRAM(S): at 11:30

## 2017-07-10 RX ADMIN — Medication 650 MILLIGRAM(S): at 12:30

## 2017-07-10 RX ADMIN — RISPERIDONE 1.5 MILLIGRAM(S): 4 TABLET ORAL at 06:23

## 2017-07-10 RX ADMIN — ONDANSETRON 4 MILLIGRAM(S): 8 TABLET, FILM COATED ORAL at 11:35

## 2017-07-10 RX ADMIN — Medication 0.5 MILLIGRAM(S): at 11:31

## 2017-07-10 RX ADMIN — Medication 20 MILLIGRAM(S): at 16:21

## 2017-07-10 RX ADMIN — Medication 20 MILLIGRAM(S): at 06:23

## 2017-07-10 RX ADMIN — PANTOPRAZOLE SODIUM 40 MILLIGRAM(S): 20 TABLET, DELAYED RELEASE ORAL at 06:22

## 2017-07-10 RX ADMIN — Medication 1 TABLET(S): at 06:22

## 2017-07-10 RX ADMIN — Medication 3 MILLILITER(S): at 06:21

## 2017-07-10 RX ADMIN — ONDANSETRON 4 MILLIGRAM(S): 8 TABLET, FILM COATED ORAL at 03:10

## 2017-07-10 NOTE — PROGRESS NOTE ADULT - ASSESSMENT
pt  with  prostatitis, on ab,  pt  's  rpt  ct  abdomen,  prelim report, with  resolving  prostatitis,   , no  evidence  of  diverticulitis,  ok  for  dc  to rehab

## 2017-07-10 NOTE — DISCHARGE NOTE ADULT - CARE PLAN
Principal Discharge DX:	Pneumonia of right lower lobe due to infectious organism  Goal:	resolved  Instructions for follow-up, activity and diet:	completed treatment  Secondary Diagnosis:	Sepsis  Secondary Diagnosis:	Bipolar disorder  Secondary Diagnosis:	Prostatitis  Goal:	continue bactrim for 1 month- first dose given 7/6/17 pm

## 2017-07-10 NOTE — DISCHARGE NOTE ADULT - HOSPITAL COURSE
pt  with  fevers,  from prostatitis, sepsis  on  admission resolved,  seen by  is, on po  bactrim  for  1  month    C/C  ABD  PAIN,  RPT  CT  WITH  RESOLVING  PROSTATITIS,  NO  DIVERTICULITIS, H/O  PSYCHOSES,  SEEN BY  PSYCH  DC  TO  REHAB  TODAY

## 2017-07-10 NOTE — DISCHARGE NOTE ADULT - CARE PROVIDER_API CALL
Rudy Martin (MD), Urology  [address] [place] [id number]  Phone: [phone]  Fax: [phone]

## 2017-07-10 NOTE — DISCHARGE NOTE ADULT - PATIENT PORTAL LINK FT
“You can access the [facility] Patient Portal, offered by [facility], by registering with the following website: [email or web address]”

## 2017-07-10 NOTE — PROGRESS NOTE ADULT - SUBJECTIVE AND OBJECTIVE BOX
- Patient seen and examined.  - In summary, patient is a 57y year old man who presented with fever (01 Jul 2017 16:36)  - Today, patient is without complaints.         *****MEDICATIONS:    MEDICATIONS  (STANDING):  diVALproex  milliGRAM(s) Oral two times a day  simvastatin 20 milliGRAM(s) Oral at bedtime  benztropine 0.5 milliGRAM(s) Oral daily  risperiDONE   Tablet 0.25 milliGRAM(s) Oral two times a day  pantoprazole    Tablet 40 milliGRAM(s) Oral before breakfast  levothyroxine 75 MICROGram(s) Oral daily  enoxaparin Injectable 40 milliGRAM(s) SubCutaneous daily  sodium chloride 0.9%. 1000 milliLiter(s) (100 mL/Hr) IV Continuous <Continuous>  piperacillin/tazobactam IVPB. 3.375 Gram(s) IV Intermittent every 8 hours  ALBUTerol/ipratropium for Nebulization 3 milliLiter(s) Nebulizer every 6 hours  ALBUTerol    90 MICROgram(s) HFA Inhaler 1 Puff(s) Inhalation every 4 hours  tiotropium 18 MICROgram(s) Capsule 1 Capsule(s) Inhalation daily  tamsulosin 0.4 milliGRAM(s) Oral at bedtime    MEDICATIONS  (PRN):  acetaminophen   Tablet 650 milliGRAM(s) Oral every 6 hours PRN For Temp greater than 38 C (100.4 F)  sodium biphosphate Rectal Enema 1 Enema Rectal daily PRN constipation  senna 2 Tablet(s) Oral at bedtime PRN Constipation         ***** REVIEW OF SYSTEM:  GEN: no fever, no chills, no pain  RESP: no SOB, no cough, no sputum  CVS: no chest pain, no palpitations, no edema  GI: no abdominal pain, no nausea, no vomiting, no constipation, no diarrhea  : no dysurea, no frequency  NEURO: no headache, no diziness  PSYCH: no depression, not anxious  Derm : no itching, no rash         ***** VITAL SIGNS:    T(F): 97.5 (07-04-17 @ 07:30), Max: 98.6 (07-03-17 @ 16:06)  HR: 73 (07-04-17 @ 07:30) (68 - 96)  BP: 100/63 (07-04-17 @ 07:30) (100/63 - 114/73)  RR: 18 (07-04-17 @ 07:30) (18 - 20)  SpO2: 97% (07-04-17 @ 07:30) (96% - 99%)  Wt(kg): --  ,   I&O's Summary    03 Jul 2017 07:01  -  04 Jul 2017 07:00  --------------------------------------------------------  IN: 3674 mL / OUT: 1310 mL / NET: 2364 mL    04 Jul 2017 07:01  -  04 Jul 2017 09:25  --------------------------------------------------------  IN: 0 mL / OUT: 250 mL / NET: -250 mL               *****PHYSICAL EXAM:  GEN: A&O X 3 , NAD , comfortable  HEENT: NCAT, EOMI, MMM, no icterus  NECK: Supple, No JVD  CVS: S1S2 , regular , No M/R/G appreciated  PULM: CTA B/L,  no W/R/R appreciated  ABD.: soft. non tender, non distended,  bowel sounds present  Extrem: intact pulses , no edema noted  Derm: No rash or ecchymosis noted  PSYCH: normal mood, no depression, not anxious         *****LAB AND IMAGING:                                              10.8   10.44 )-----------( 119      ( 03 Jul 2017 07:20 )             33.2               07-03    140  |  105  |  8   ----------------------------<  93  3.8   |  22  |  0.99    Ca    8.6      03 Jul 2017 07:12          [All pertinent recent Imaging/Reports reviewed]         *****A S S E S S M E N T   A N D   P L A N :    57M fever, baseline MR, diverticulitis, prostatitis  abx per ID  f/u cxs  CT abd noted  TTE pending  VSS    __________________________  JOSE Benjamin D.O.
- Patient seen and examined.  - In summary, patient is a 57y year old man who presented with fever (01 Jul 2017 16:36)  - Today, patient is without complaints.         *****MEDICATIONS:    MEDICATIONS  (STANDING):  simvastatin 20 milliGRAM(s) Oral at bedtime  benztropine 0.5 milliGRAM(s) Oral daily  risperiDONE   Tablet 0.25 milliGRAM(s) Oral two times a day  pantoprazole    Tablet 40 milliGRAM(s) Oral before breakfast  levothyroxine 75 MICROGram(s) Oral daily  enoxaparin Injectable 40 milliGRAM(s) SubCutaneous daily  piperacillin/tazobactam IVPB. 3.375 Gram(s) IV Intermittent every 8 hours  ALBUTerol/ipratropium for Nebulization 3 milliLiter(s) Nebulizer every 6 hours  ALBUTerol    90 MICROgram(s) HFA Inhaler 1 Puff(s) Inhalation every 4 hours  tiotropium 18 MICROgram(s) Capsule 1 Capsule(s) Inhalation daily  tamsulosin 0.4 milliGRAM(s) Oral at bedtime  diVALproex  milliGRAM(s) Oral two times a day  sodium chloride 0.9%. 1000 milliLiter(s) (50 mL/Hr) IV Continuous <Continuous>  dicyclomine 20 milliGRAM(s) Oral two times a day before meals    MEDICATIONS  (PRN):  acetaminophen   Tablet 650 milliGRAM(s) Oral every 6 hours PRN For Temp greater than 38 C (100.4 F)  sodium biphosphate Rectal Enema 1 Enema Rectal daily PRN constipation  senna 2 Tablet(s) Oral at bedtime PRN Constipation         ***** REVIEW OF SYSTEM:  GEN: no fever, no chills, no pain  RESP: no SOB, no cough, no sputum  CVS: no chest pain, no palpitations, no edema  GI: no abdominal pain, no nausea, no vomiting, no constipation, no diarrhea  : no dysurea, no frequency  NEURO: no headache, no diziness  PSYCH: no depression, not anxious  Derm : no itching, no rash         ***** VITAL SIGNS:    T(F): 97.9 (07-05-17 @ 08:11), Max: 97.9 (07-05-17 @ 08:11)  HR: 62 (07-05-17 @ 08:11) (61 - 82)  BP: 124/76 (07-05-17 @ 08:11) (99/65 - 124/76)  RR: 18 (07-05-17 @ 08:11) (18 - 18)  SpO2: 98% (07-05-17 @ 08:11) (98% - 99%)  Wt(kg): --  ,   I&O's Summary    04 Jul 2017 07:01  -  05 Jul 2017 07:00  --------------------------------------------------------  IN: 2637 mL / OUT: 1450 mL / NET: 1187 mL           *****PHYSICAL EXAM:  GEN: A&O X 3 , NAD , comfortable  HEENT: NCAT, EOMI, MMM, no icterus  NECK: Supple, No JVD  CVS: S1S2 , regular , No M/R/G appreciated  PULM: CTA B/L,  no W/R/R appreciated  ABD.: soft. non tender, non distended,  bowel sounds present  Extrem: intact pulses , no edema noted  Derm: No rash or ecchymosis noted  PSYCH: normal mood, no depression, not anxious         *****LAB AND IMAGING:                        10.5   4.3   )-----------( 151      ( 05 Jul 2017 08:29 )             30.6               07-04    127<L>  |  94<L>  |  7   ----------------------------<  89  3.8   |  19<L>  |  0.84    Ca    8.8      04 Jul 2017 10:08    [All pertinent recent Imaging/Reports reviewed]         *****A S S E S S M E N T   A N D   P L A N :    57M baseline MR, diverticulitis, prostatitis  abx per ID  f/u cxs  CT abd noted  TTE pending  VSS    __________________________  JOSE Benjamin D.O.
- Patient seen and examined.  - In summary, patient is a 57y year old man who presented with fever (01 Jul 2017 16:36)  - Today, patient is without complaints.         *****MEDICATIONS:    MEDICATIONS  (STANDING):  simvastatin 20 milliGRAM(s) Oral at bedtime  benztropine 0.5 milliGRAM(s) Oral daily  risperiDONE   Tablet 0.25 milliGRAM(s) Oral two times a day  pantoprazole    Tablet 40 milliGRAM(s) Oral before breakfast  levothyroxine 75 MICROGram(s) Oral daily  enoxaparin Injectable 40 milliGRAM(s) SubCutaneous daily  piperacillin/tazobactam IVPB. 3.375 Gram(s) IV Intermittent every 8 hours  ALBUTerol/ipratropium for Nebulization 3 milliLiter(s) Nebulizer every 6 hours  ALBUTerol    90 MICROgram(s) HFA Inhaler 1 Puff(s) Inhalation every 4 hours  tiotropium 18 MICROgram(s) Capsule 1 Capsule(s) Inhalation daily  tamsulosin 0.4 milliGRAM(s) Oral at bedtime  diVALproex  milliGRAM(s) Oral two times a day  sodium chloride 0.9%. 1000 milliLiter(s) (50 mL/Hr) IV Continuous <Continuous>  dicyclomine 20 milliGRAM(s) Oral two times a day before meals    MEDICATIONS  (PRN):  acetaminophen   Tablet 650 milliGRAM(s) Oral every 6 hours PRN For Temp greater than 38 C (100.4 F)  sodium biphosphate Rectal Enema 1 Enema Rectal daily PRN constipation  senna 2 Tablet(s) Oral at bedtime PRN Constipation         ***** REVIEW OF SYSTEM:  GEN: no fever, no chills, no pain  RESP: no SOB, no cough, no sputum  CVS: no chest pain, no palpitations, no edema  GI: no abdominal pain, no nausea, no vomiting, no constipation, no diarrhea  : no dysurea, no frequency  NEURO: no headache, no diziness  PSYCH: no depression, not anxious  Derm : no itching, no rash         ***** VITAL SIGNS:    T(F): 97.9 (07-06-17 @ 08:00), Max: 98 (07-06-17 @ 03:00)  HR: 62 (07-06-17 @ 08:00) (59 - 62)  BP: 128/64 (07-06-17 @ 08:00) (100/62 - 128/64)  RR: 18 (07-06-17 @ 08:00) (18 - 18)  SpO2: 97% (07-06-17 @ 08:00) (97% - 98%)  Wt(kg): --  ,   I&O's Summary    05 Jul 2017 07:01  -  06 Jul 2017 07:00  --------------------------------------------------------  IN: 400 mL / OUT: 2850 mL / NET: -2450 mL               *****PHYSICAL EXAM:  GEN: A&O X 3 , NAD , comfortable  HEENT: NCAT, EOMI, MMM, no icterus  NECK: Supple, No JVD  CVS: S1S2 , regular , No M/R/G appreciated  PULM: CTA B/L,  no W/R/R appreciated  ABD.: soft. non tender, non distended,  bowel sounds present  Extrem: intact pulses , no edema noted  Derm: No rash or ecchymosis noted  PSYCH: normal mood, no depression, not anxious         *****LAB AND IMAGING:                                   10.5   4.3   )-----------( 151      ( 05 Jul 2017 08:29 )             30.6               07-05    147<H>  |  109<H>  |  6<L>  ----------------------------<  97  3.5   |  26  |  0.88    Ca    8.4      05 Jul 2017 08:28      [All pertinent recent Imaging/Reports reviewed]    < from: Transthoracic Echocardiogram (07.05.17 @ 09:45) >  Conclusions:  1. Normal left ventricular internal dimensions and wall  thicknesses.  2. Normal left ventricular systolic function. No segmental  wall motion abnormalities.  3. Normal diastolic function  4. Normal right ventricular size and function.  *** Compared with echocardiogram of 9/13/2016, no  significant changes noted.    < end of copied text >         *****A S S E S S M E N T   A N D   P L A N :    57M baseline MR, diverticulitis, prostatitis  abx per ID  f/u cxs  TTE as noted  VSS    __________________________  A. PINEDA Benjamin
- Patient seen and examined.  - In summary, patient is a 57y year old man who presented with fever (2017 16:36)  - Today, patient is without complaints.         *****MEDICATIONS:    MEDICATIONS  (STANDING):  diVALproex  milliGRAM(s) Oral two times a day  simvastatin 20 milliGRAM(s) Oral at bedtime  benztropine 0.5 milliGRAM(s) Oral daily  risperiDONE   Tablet 0.25 milliGRAM(s) Oral two times a day  pantoprazole    Tablet 40 milliGRAM(s) Oral before breakfast  levothyroxine 75 MICROGram(s) Oral daily  enoxaparin Injectable 40 milliGRAM(s) SubCutaneous daily  sodium chloride 0.9%. 1000 milliLiter(s) (100 mL/Hr) IV Continuous <Continuous>  piperacillin/tazobactam IVPB. 3.375 Gram(s) IV Intermittent every 8 hours  ALBUTerol/ipratropium for Nebulization 3 milliLiter(s) Nebulizer every 6 hours  ALBUTerol    90 MICROgram(s) HFA Inhaler 1 Puff(s) Inhalation every 4 hours  tiotropium 18 MICROgram(s) Capsule 1 Capsule(s) Inhalation daily  tamsulosin 0.4 milliGRAM(s) Oral at bedtime    MEDICATIONS  (PRN):  acetaminophen   Tablet 650 milliGRAM(s) Oral every 6 hours PRN For Temp greater than 38 C (100.4 F)  sodium biphosphate Rectal Enema 1 Enema Rectal daily PRN constipation  senna 2 Tablet(s) Oral at bedtime PRN Constipation         ***** REVIEW OF SYSTEM:  GEN: no fever, no chills, no pain  RESP: no SOB, no cough, no sputum  CVS: no chest pain, no palpitations, no edema  GI: no abdominal pain, no nausea, no vomiting, no constipation, no diarrhea  : no dysurea, no frequency  NEURO: no headache, no diziness  PSYCH: no depression, not anxious  Derm : no itching, no rash         ***** VITAL SIGNS:    T(F): 98.6 (17 @ 07:22), Max: 102 (17 @ 13:20)  HR: 92 (17 @ 07:22) (92 - 123)  BP: 102/65 (17 @ 07:22) (90/54 - 102/66)  RR: 18 (17 @ 07:22) (18 - 18)  SpO2: 95% (17 @ 07:22) (94% - 96%)  Wt(kg): --  ,   I&O's Summary    2017 07:  -  2017 07:00  --------------------------------------------------------  IN: 1920 mL / OUT: 0 mL / NET: 1920 mL    2017 07:  -  2017 09:36  --------------------------------------------------------  IN: 0 mL / OUT: 300 mL / NET: -300 mL             *****PHYSICAL EXAM:  GEN: A&O X 3 , NAD , comfortable  HEENT: NCAT, EOMI, MMM, no icterus  NECK: Supple, No JVD  CVS: S1S2 , regular , No M/R/G appreciated  PULM: CTA B/L,  no W/R/R appreciated  ABD.: soft. non tender, non distended,  bowel sounds present  Extrem: intact pulses , no edema noted  Derm: No rash or ecchymosis noted  PSYCH: normal mood, no depression, not anxious         *****LAB AND IMAGING:                                   10.8   10.44 )-----------( 119      ( 2017 07:20 )             33.2               07-03    140  |  105  |  8   ----------------------------<  93  3.8   |  22  |  0.99    Ca    8.6      2017 07:12    TPro  7.6  /  Alb  4.1  /  TBili  0.6  /  DBili  x   /  AST  10  /  ALT  11  /  AlkPhos  42  07-01    PT/INR - ( 2017 12:41 )   PT: 12.6 sec;   INR: 1.15 ratio         PTT - ( 2017 12:41 )  PTT:29.9 sec       CARDIAC MARKERS ( 2017 12:41 )  x     / <0.01 ng/mL / x     / x     / x                  Urinalysis Basic - ( 2017 12:41 )    Color: Yellow / Appearance: Clear / S.020 / pH: x  Gluc: x / Ketone: Trace  / Bili: Negative / Urobili: 1   Blood: x / Protein: 30 mg/dL / Nitrite: Negative   Leuk Esterase: Small / RBC: 0-2 /HPF / WBC 11-25 /HPF   Sq Epi: x / Non Sq Epi: Negative /HPF / Bacteria: Negative /HPF    [All pertinent recent Imaging/Reports reviewed]         *****A S S E S S M E N T   A N D   P L A N :    57M fever, baseline MR, diverticulitis  abx per ID  f/u cxs  CT abd noted  TTE      __________________________  JOSE Benjamin D.O.
- Patient seen and examined.  - In summary, patient is a 57y year old man who presented with fever (2017 16:36)  - Today, patient is without complaints.         *****MEDICATIONS:    MEDICATIONS  (STANDING):  simvastatin 20 milliGRAM(s) Oral at bedtime  benztropine 0.5 milliGRAM(s) Oral daily  pantoprazole    Tablet 40 milliGRAM(s) Oral before breakfast  levothyroxine 75 MICROGram(s) Oral daily  enoxaparin Injectable 40 milliGRAM(s) SubCutaneous daily  ALBUTerol/ipratropium for Nebulization 3 milliLiter(s) Nebulizer every 6 hours  ALBUTerol    90 MICROgram(s) HFA Inhaler 1 Puff(s) Inhalation every 4 hours  tiotropium 18 MICROgram(s) Capsule 1 Capsule(s) Inhalation daily  tamsulosin 0.4 milliGRAM(s) Oral at bedtime  diVALproex  milliGRAM(s) Oral two times a day  dicyclomine 20 milliGRAM(s) Oral two times a day before meals  risperiDONE   Tablet 1.5 milliGRAM(s) Oral two times a day  trimethoprim  160 mG/sulfamethoxazole 800 mG 1 Tablet(s) Oral two times a day    MEDICATIONS  (PRN):  acetaminophen   Tablet 650 milliGRAM(s) Oral every 6 hours PRN For Temp greater than 38 C (100.4 F)  sodium biphosphate Rectal Enema 1 Enema Rectal daily PRN constipation  senna 2 Tablet(s) Oral at bedtime PRN Constipation  acetaminophen   Tablet. 650 milliGRAM(s) Oral every 6 hours PRN Moderate Pain (4 - 6)  ondansetron Injectable 4 milliGRAM(s) IV Push every 6 hours PRN Nausea and/or Vomiting         ***** REVIEW OF SYSTEM:  GEN: no fever, no chills, no pain  RESP: no SOB, no cough, no sputum  CVS: no chest pain, no palpitations, no edema  GI: no abdominal pain, no nausea, no vomiting, no constipation, no diarrhea  : no dysurea, no frequency  NEURO: no headache, no diziness  PSYCH: no depression, not anxious  Derm : no itching, no rash         ***** VITAL SIGNS:    T(F): 97.7 (17 @ 07:40), Max: 97.7 (07-07-17 @ 14:01)  HR: 68 (17 @ 07:40) (66 - 70)  BP: 108/68 (17 @ 07:40) (108/68 - 108/72)  RR: 18 (17 @ 07:40) (18 - 18)  SpO2: 95% (17 @ 07:40) (95% - 95%)  Wt(kg): --  ,   I&O's Summary    2017 07:01  -  2017 07:00  --------------------------------------------------------  IN: 1100 mL / OUT: 2100 mL / NET: -1000 mL               *****PHYSICAL EXAM:  GEN: A&O X 3 , NAD , comfortable  HEENT: NCAT, EOMI, MMM, no icterus  NECK: Supple, No JVD  CVS: S1S2 , regular , No M/R/G appreciated  PULM: CTA B/L,  no W/R/R appreciated  ABD.: soft. non tender, non distended,  bowel sounds present  Extrem: intact pulses , no edema noted  Derm: No rash or ecchymosis noted  PSYCH: normal mood, no depression, not anxious         *****LAB AND IMAGIN.8   5.0   )-----------( 207      ( 2017 08:24 )             34.7               07-07    147<H>  |  109<H>  |  6<L>  ----------------------------<  87  3.8   |  28  |  0.95    Ca    9.1      2017 08:24        [All pertinent recent Imaging/Reports reviewed]    < from: Transthoracic Echocardiogram (17 @ 09:45) >  Conclusions:  1. Normal left ventricular internal dimensions and wall  thicknesses.  2. Normal left ventricular systolic function. No segmental  wall motion abnormalities.  3. Normal diastolic function  4. Normal right ventricular size and function.  *** Compared with echocardiogram of 2016, no  significant changes noted.    < end of copied text >         *****A S S E S S M E N T   A N D   P L A N :    57M baseline MR, diverticulitis, prostatitis  abx per ID  f/u cxs  TTE as noted  VSS  pending ANTONIO  __________________________  A. PINEDA Benjamin
- Patient seen and examined.  - In summary, patient is a 57y year old man who presented with fever (2017 16:36)  - Today, patient is without complaints.         *****MEDICATIONS:    MEDICATIONS  (STANDING):  simvastatin 20 milliGRAM(s) Oral at bedtime  benztropine 0.5 milliGRAM(s) Oral daily  pantoprazole    Tablet 40 milliGRAM(s) Oral before breakfast  levothyroxine 75 MICROGram(s) Oral daily  enoxaparin Injectable 40 milliGRAM(s) SubCutaneous daily  ALBUTerol/ipratropium for Nebulization 3 milliLiter(s) Nebulizer every 6 hours  ALBUTerol    90 MICROgram(s) HFA Inhaler 1 Puff(s) Inhalation every 4 hours  tiotropium 18 MICROgram(s) Capsule 1 Capsule(s) Inhalation daily  tamsulosin 0.4 milliGRAM(s) Oral at bedtime  diVALproex  milliGRAM(s) Oral two times a day  dicyclomine 20 milliGRAM(s) Oral two times a day before meals  risperiDONE   Tablet 1.5 milliGRAM(s) Oral two times a day  trimethoprim  160 mG/sulfamethoxazole 800 mG 1 Tablet(s) Oral two times a day  docusate sodium 100 milliGRAM(s) Oral two times a day  dextrose 5%. 1000 milliLiter(s) (60 mL/Hr) IV Continuous <Continuous>  polyethylene glycol 3350 17 Gram(s) Oral daily    MEDICATIONS  (PRN):  acetaminophen   Tablet 650 milliGRAM(s) Oral every 6 hours PRN For Temp greater than 38 C (100.4 F)  sodium biphosphate Rectal Enema 1 Enema Rectal daily PRN constipation  senna 2 Tablet(s) Oral at bedtime PRN Constipation  acetaminophen   Tablet. 650 milliGRAM(s) Oral every 6 hours PRN Moderate Pain (4 - 6)  ondansetron Injectable 4 milliGRAM(s) IV Push every 6 hours PRN Nausea and/or Vomiting         ***** REVIEW OF SYSTEM:  GEN: no fever, no chills, no pain  RESP: no SOB, no cough, no sputum  CVS: no chest pain, no palpitations, no edema  GI: no abdominal pain, no nausea, no vomiting, no constipation, no diarrhea  : no dysurea, no frequency  NEURO: no headache, no diziness  PSYCH: no depression, not anxious  Derm : no itching, no rash         ***** VITAL SIGNS:    T(F): 97.7 (17 @ 08:11), Max: 98.1 (17 @ 21:00)  HR: 66 (17 @ 08:11) (61 - 74)  BP: 124/85 (17 @ 08:11) (106/67 - 124/85)  RR: 18 (17 @ 08:11) (18 - 18)  SpO2: 98% (17 @ 08:11) (96% - 99%)  Wt(kg): --  ,   I&O's Summary    2017 07:  -  2017 07:00  --------------------------------------------------------  IN: 1760 mL / OUT: 2765 mL / NET: -1005 mL    2017 07:01  -  2017 09:31  --------------------------------------------------------  IN: 0 mL / OUT: 200 mL / NET: -200 mL           *****PHYSICAL EXAM:  GEN: A&O X 3 , NAD , comfortable  HEENT: NCAT, EOMI, MMM, no icterus  NECK: Supple, No JVD  CVS: S1S2 , regular , No M/R/G appreciated  PULM: CTA B/L,  no W/R/R appreciated  ABD.: soft. non tender, non distended,  bowel sounds present  Extrem: intact pulses , no edema noted  Derm: No rash or ecchymosis noted  PSYCH: normal mood, no depression, not anxious         *****LAB AND IMAGIN-09    142  |  105  |  10  ----------------------------<  78  3.5   |  25  |  1.05    Ca    9.5      2017 08:29      [All pertinent recent Imaging/Reports reviewed]    < from: Transthoracic Echocardiogram (17 @ 09:45) >  Conclusions:  1. Normal left ventricular internal dimensions and wall  thicknesses.  2. Normal left ventricular systolic function. No segmental  wall motion abnormalities.  3. Normal diastolic function  4. Normal right ventricular size and function.  *** Compared with echocardiogram of 2016, no  significant changes noted.    < end of copied text >         *****A S S E S S M E N T   A N D   P L A N :    57M baseline MR, diverticulitis, prostatitis  abx per ID  f/u cxs  TTE as noted  VSS  pending ANTONIO  GI f/u noted  __________________________  A. YONY Benjamin.
- Patient seen and examined.  - In summary, patient is a 57y year old man who presented with fever (2017 16:36)  - Today, patient is without complaints.         *****MEDICATIONS:    MEDICATIONS  (STANDING):  simvastatin 20 milliGRAM(s) Oral at bedtime  benztropine 0.5 milliGRAM(s) Oral daily  pantoprazole    Tablet 40 milliGRAM(s) Oral before breakfast  levothyroxine 75 MICROGram(s) Oral daily  enoxaparin Injectable 40 milliGRAM(s) SubCutaneous daily  ALBUTerol/ipratropium for Nebulization 3 milliLiter(s) Nebulizer every 6 hours  ALBUTerol    90 MICROgram(s) HFA Inhaler 1 Puff(s) Inhalation every 4 hours  tiotropium 18 MICROgram(s) Capsule 1 Capsule(s) Inhalation daily  tamsulosin 0.4 milliGRAM(s) Oral at bedtime  diVALproex  milliGRAM(s) Oral two times a day  dicyclomine 20 milliGRAM(s) Oral two times a day before meals  risperiDONE   Tablet 1.5 milliGRAM(s) Oral two times a day  trimethoprim  160 mG/sulfamethoxazole 800 mG 1 Tablet(s) Oral two times a day  docusate sodium 100 milliGRAM(s) Oral two times a day  dextrose 5%. 1000 milliLiter(s) (60 mL/Hr) IV Continuous <Continuous>  polyethylene glycol 3350 17 Gram(s) Oral daily    MEDICATIONS  (PRN):  acetaminophen   Tablet 650 milliGRAM(s) Oral every 6 hours PRN For Temp greater than 38 C (100.4 F)  sodium biphosphate Rectal Enema 1 Enema Rectal daily PRN constipation  senna 2 Tablet(s) Oral at bedtime PRN Constipation  acetaminophen   Tablet. 650 milliGRAM(s) Oral every 6 hours PRN Moderate Pain (4 - 6)  ondansetron Injectable 4 milliGRAM(s) IV Push every 6 hours PRN Nausea and/or Vomiting         ***** REVIEW OF SYSTEM:  GEN: no fever, no chills, no pain  RESP: no SOB, no cough, no sputum  CVS: no chest pain, no palpitations, no edema  GI: no abdominal pain, no nausea, no vomiting, no constipation, no diarrhea  : no dysurea, no frequency  NEURO: no headache, no diziness  PSYCH: no depression, not anxious  Derm : no itching, no rash         ***** VITAL SIGNS:    T(F): 97.8 (07-10-17 @ 07:45), Max: 98.4 (07-10-17 @ 02:57)  HR: 77 (07-10-17 @ 07:45) (69 - 83)  BP: 96/60 (07-10-17 @ 07:45) (96/60 - 126/75)  RR: 18 (07-10-17 @ 07:45) (18 - 18)  SpO2: 95% (07-10-17 @ 07:45) (95% - 100%)  Wt(kg): --  ,   I&O's Summary    2017 07:01  -  10 Jul 2017 07:00  --------------------------------------------------------  IN: 1840 mL / OUT: 2410 mL / NET: -570 mL                 *****PHYSICAL EXAM:  GEN: A&O X 3 , NAD , comfortable  HEENT: NCAT, EOMI, MMM, no icterus  NECK: Supple, No JVD  CVS: S1S2 , regular , No M/R/G appreciated  PULM: CTA B/L,  no W/R/R appreciated  ABD.: soft. non tender, non distended,  bowel sounds present  Extrem: intact pulses , no edema noted  Derm: No rash or ecchymosis noted  PSYCH: normal mood, no depression, not anxious         *****LAB AND IMAGIN-09    142  |  105  |  10  ----------------------------<  78  3.5   |  25  |  1.05    Ca    9.5      2017 08:29      [All pertinent recent Imaging/Reports reviewed]    < from: Transthoracic Echocardiogram (17 @ 09:45) >  Conclusions:  1. Normal left ventricular internal dimensions and wall  thicknesses.  2. Normal left ventricular systolic function. No segmental  wall motion abnormalities.  3. Normal diastolic function  4. Normal right ventricular size and function.  *** Compared with echocardiogram of 2016, no  significant changes noted.    < end of copied text >         *****A S S E S S M E N T   A N D   P L A N :    57M baseline MR, diverticulitis, prostatitis  abx per ID  f/u cxs  TTE as noted  VSS  pending ANTONIO  GI f/u   __________________________  A. YONY Benjamin.
- Patient seen and examined.  - In summary, patient is a 57y year old man who presented with fever (2017 16:36)  - Today, patient is without complaints.         *****MEDICATIONS:    MEDICATIONS  (STANDING):  simvastatin 20 milliGRAM(s) Oral at bedtime  benztropine 0.5 milliGRAM(s) Oral daily  pantoprazole    Tablet 40 milliGRAM(s) Oral before breakfast  levothyroxine 75 MICROGram(s) Oral daily  enoxaparin Injectable 40 milliGRAM(s) SubCutaneous daily  ALBUTerol/ipratropium for Nebulization 3 milliLiter(s) Nebulizer every 6 hours  ALBUTerol    90 MICROgram(s) HFA Inhaler 1 Puff(s) Inhalation every 4 hours  tiotropium 18 MICROgram(s) Capsule 1 Capsule(s) Inhalation daily  tamsulosin 0.4 milliGRAM(s) Oral at bedtime  diVALproex  milliGRAM(s) Oral two times a day  sodium chloride 0.9%. 1000 milliLiter(s) (50 mL/Hr) IV Continuous <Continuous>  dicyclomine 20 milliGRAM(s) Oral two times a day before meals  risperiDONE   Tablet 1.5 milliGRAM(s) Oral two times a day  trimethoprim  160 mG/sulfamethoxazole 800 mG 1 Tablet(s) Oral two times a day    MEDICATIONS  (PRN):  acetaminophen   Tablet 650 milliGRAM(s) Oral every 6 hours PRN For Temp greater than 38 C (100.4 F)  sodium biphosphate Rectal Enema 1 Enema Rectal daily PRN constipation  senna 2 Tablet(s) Oral at bedtime PRN Constipation  acetaminophen   Tablet. 650 milliGRAM(s) Oral every 6 hours PRN Moderate Pain (4 - 6)  ondansetron Injectable 4 milliGRAM(s) IV Push every 6 hours PRN Nausea and/or Vomiting         ***** REVIEW OF SYSTEM:  GEN: no fever, no chills, no pain  RESP: no SOB, no cough, no sputum  CVS: no chest pain, no palpitations, no edema  GI: no abdominal pain, no nausea, no vomiting, no constipation, no diarrhea  : no dysurea, no frequency  NEURO: no headache, no diziness  PSYCH: no depression, not anxious  Derm : no itching, no rash         ***** VITAL SIGNS:    T(F): 97.5 (17 @ 07:37), Max: 98.1 (17 @ 23:51)  HR: 67 (17 @ 07:37) (63 - 92)  BP: 116/74 (17 @ 07:37) (107/57 - 117/76)  RR: 18 (17 @ 07:37) (18 - 18)  SpO2: 92% (17 @ 07:37) (92% - 100%)  Wt(kg): --  ,   I&O's Summary    2017 07:01  -  2017 07:00  --------------------------------------------------------  IN: 2200 mL / OUT: 2625 mL / NET: -425 mL                 *****PHYSICAL EXAM:  GEN: A&O X 3 , NAD , comfortable  HEENT: NCAT, EOMI, MMM, no icterus  NECK: Supple, No JVD  CVS: S1S2 , regular , No M/R/G appreciated  PULM: CTA B/L,  no W/R/R appreciated  ABD.: soft. non tender, non distended,  bowel sounds present  Extrem: intact pulses , no edema noted  Derm: No rash or ecchymosis noted  PSYCH: normal mood, no depression, not anxious         *****LAB AND IMAGIN.0   4.8   )-----------( 173      ( 2017 08:05 )             34.3               07-    145  |  106  |  7   ----------------------------<  91  3.8   |  28  |  0.86    Ca    8.7      2017 08:06      [All pertinent recent Imaging/Reports reviewed]    < from: Transthoracic Echocardiogram (17 @ 09:45) >  Conclusions:  1. Normal left ventricular internal dimensions and wall  thicknesses.  2. Normal left ventricular systolic function. No segmental  wall motion abnormalities.  3. Normal diastolic function  4. Normal right ventricular size and function.  *** Compared with echocardiogram of 2016, no  significant changes noted.    < end of copied text >         *****A S S E S S M E N T   A N D   P L A N :    57M baseline MR, diverticulitis, prostatitis  abx per ID  f/u cxs  TTE as noted  VSS    __________________________  A. YONY Benjamin.
CHERIE ASHA:[id number],   57yMale followed for: diverticulitis  No Known Allergies    PAST MEDICAL & SURGICAL HISTORY:  MR (mental retardation)  Hypothyroidism  Hydronephrosis with Renal and Ureteral Calculous Obstruction: at last admission in [place] ([place]) 2010  Nephrolithiasis  Bipolar Disorder  S/P Appendectomy: 1997    FAMILY HISTORY:  Family history of hyperparathyroidism (Sibling)    MEDICATIONS  (STANDING):  simvastatin 20 milliGRAM(s) Oral at bedtime  benztropine 0.5 milliGRAM(s) Oral daily  risperiDONE   Tablet 0.25 milliGRAM(s) Oral two times a day  pantoprazole    Tablet 40 milliGRAM(s) Oral before breakfast  levothyroxine 75 MICROGram(s) Oral daily  enoxaparin Injectable 40 milliGRAM(s) SubCutaneous daily  piperacillin/tazobactam IVPB. 3.375 Gram(s) IV Intermittent every 8 hours  ALBUTerol/ipratropium for Nebulization 3 milliLiter(s) Nebulizer every 6 hours  ALBUTerol    90 MICROgram(s) HFA Inhaler 1 Puff(s) Inhalation every 4 hours  tiotropium 18 MICROgram(s) Capsule 1 Capsule(s) Inhalation daily  tamsulosin 0.4 milliGRAM(s) Oral at bedtime  diVALproex  milliGRAM(s) Oral two times a day  sodium chloride 0.9%. 1000 milliLiter(s) (50 mL/Hr) IV Continuous <Continuous>  dicyclomine 20 milliGRAM(s) Oral two times a day before meals    MEDICATIONS  (PRN):  acetaminophen   Tablet 650 milliGRAM(s) Oral every 6 hours PRN For Temp greater than 38 C (100.4 F)  sodium biphosphate Rectal Enema 1 Enema Rectal daily PRN constipation  senna 2 Tablet(s) Oral at bedtime PRN Constipation      Vital Signs Last 24 Hrs  T(C): 36.7 (06 Jul 2017 14:30), Max: 36.7 (06 Jul 2017 03:00)  T(F): 98 (06 Jul 2017 14:30), Max: 98 (06 Jul 2017 03:00)  HR: 86 (06 Jul 2017 14:30) (62 - 86)  BP: 114/72 (06 Jul 2017 14:30) (114/72 - 128/64)  BP(mean): --  RR: 18 (06 Jul 2017 14:30) (18 - 18)  SpO2: 98% (06 Jul 2017 14:30) (97% - 98%)  nc/at  s1s2  cta  soft, nt, nd no guarding or rebound  no c/c/e    CBC Full  -  ( 06 Jul 2017 08:05 )  WBC Count : 4.8 K/uL  Hemoglobin : 11.0 g/dL  Hematocrit : 34.3 %  Platelet Count - Automated : 173 K/uL  Mean Cell Volume : 94.0 fl  Mean Cell Hemoglobin : 30.2 pg  Mean Cell Hemoglobin Concentration : 32.1 gm/dL  Auto Neutrophil # : x  Auto Lymphocyte # : x  Auto Monocyte # : x  Auto Eosinophil # : x  Auto Basophil # : x  Auto Neutrophil % : x  Auto Lymphocyte % : x  Auto Monocyte % : x  Auto Eosinophil % : x  Auto Basophil % : x    07-06    145  |  106  |  7   ----------------------------<  91  3.8   |  28  |  0.86    Ca    8.7      06 Jul 2017 08:06
CHERIE ASHA:[id number],   57yMale followed for: dysepspia  No Known Allergies    PAST MEDICAL & SURGICAL HISTORY:  MR (mental retardation)  Hypothyroidism  Hydronephrosis with Renal and Ureteral Calculous Obstruction: at last admission in [place] ([place]) 2010  Nephrolithiasis  Bipolar Disorder  S/P Appendectomy: 1997    FAMILY HISTORY:  Family history of hyperparathyroidism (Sibling)    MEDICATIONS  (STANDING):  simvastatin 20 milliGRAM(s) Oral at bedtime  benztropine 0.5 milliGRAM(s) Oral daily  pantoprazole    Tablet 40 milliGRAM(s) Oral before breakfast  levothyroxine 75 MICROGram(s) Oral daily  enoxaparin Injectable 40 milliGRAM(s) SubCutaneous daily  ALBUTerol/ipratropium for Nebulization 3 milliLiter(s) Nebulizer every 6 hours  ALBUTerol    90 MICROgram(s) HFA Inhaler 1 Puff(s) Inhalation every 4 hours  tiotropium 18 MICROgram(s) Capsule 1 Capsule(s) Inhalation daily  tamsulosin 0.4 milliGRAM(s) Oral at bedtime  diVALproex  milliGRAM(s) Oral two times a day  dicyclomine 20 milliGRAM(s) Oral two times a day before meals  risperiDONE   Tablet 1.5 milliGRAM(s) Oral two times a day  trimethoprim  160 mG/sulfamethoxazole 800 mG 1 Tablet(s) Oral two times a day  docusate sodium 100 milliGRAM(s) Oral two times a day  polyethylene glycol 3350 17 Gram(s) Oral daily    MEDICATIONS  (PRN):  acetaminophen   Tablet 650 milliGRAM(s) Oral every 6 hours PRN For Temp greater than 38 C (100.4 F)  sodium biphosphate Rectal Enema 1 Enema Rectal daily PRN constipation  senna 2 Tablet(s) Oral at bedtime PRN Constipation  acetaminophen   Tablet. 650 milliGRAM(s) Oral every 6 hours PRN Moderate Pain (4 - 6)  ondansetron Injectable 4 milliGRAM(s) IV Push every 6 hours PRN Nausea and/or Vomiting      Vital Signs Last 24 Hrs  T(C): 36.6 (10 Jul 2017 07:45), Max: 36.9 (10 Jul 2017 02:57)  T(F): 97.8 (10 Jul 2017 07:45), Max: 98.4 (10 Jul 2017 02:57)  HR: 77 (10 Jul 2017 07:45) (69 - 83)  BP: 96/60 (10 Jul 2017 07:45) (96/60 - 126/75)  BP(mean): --  RR: 18 (10 Jul 2017 07:45) (18 - 18)  SpO2: 95% (10 Jul 2017 07:45) (95% - 100%)  nc/at  s1s2  cta  soft, nt, nd no guarding or rebound  no c/c/e      07-09    142  |  105  |  10  ----------------------------<  78  3.5   |  25  |  1.05    Ca    9.5      09 Jul 2017 08:29
CHERIE BARRY:[id number],   57yMale followed for: diverticulitis  No Known Allergies    PAST MEDICAL & SURGICAL HISTORY:  MR (mental retardation)  Hypothyroidism  Hydronephrosis with Renal and Ureteral Calculous Obstruction: at last admission in [place] ([place]) 2010  Nephrolithiasis  Bipolar Disorder  S/P Appendectomy: 1997    FAMILY HISTORY:  Family history of hyperparathyroidism (Sibling)    MEDICATIONS  (STANDING):  diVALproex  milliGRAM(s) Oral two times a day  simvastatin 20 milliGRAM(s) Oral at bedtime  benztropine 0.5 milliGRAM(s) Oral daily  risperiDONE   Tablet 0.25 milliGRAM(s) Oral two times a day  pantoprazole    Tablet 40 milliGRAM(s) Oral before breakfast  levothyroxine 75 MICROGram(s) Oral daily  enoxaparin Injectable 40 milliGRAM(s) SubCutaneous daily  sodium chloride 0.9%. 1000 milliLiter(s) (100 mL/Hr) IV Continuous <Continuous>  piperacillin/tazobactam IVPB. 3.375 Gram(s) IV Intermittent every 8 hours  ALBUTerol/ipratropium for Nebulization 3 milliLiter(s) Nebulizer every 6 hours  ALBUTerol    90 MICROgram(s) HFA Inhaler 1 Puff(s) Inhalation every 4 hours  tiotropium 18 MICROgram(s) Capsule 1 Capsule(s) Inhalation daily    MEDICATIONS  (PRN):  acetaminophen   Tablet 650 milliGRAM(s) Oral every 6 hours PRN For Temp greater than 38 C (100.4 F)  sodium biphosphate Rectal Enema 1 Enema Rectal daily PRN constipation  senna 2 Tablet(s) Oral at bedtime PRN Constipation      Vital Signs Last 24 Hrs  T(C): 37 (03 Jul 2017 07:22), Max: 38.9 (02 Jul 2017 13:20)  T(F): 98.6 (03 Jul 2017 07:22), Max: 102 (02 Jul 2017 13:20)  HR: 92 (03 Jul 2017 07:22) (92 - 123)  BP: 102/65 (03 Jul 2017 07:22) (90/54 - 102/66)  BP(mean): --  RR: 18 (03 Jul 2017 07:22) (18 - 18)  SpO2: 95% (03 Jul 2017 07:22) (94% - 98%)  nc/at  s1s2  cta  soft, nt, nd no guarding or rebound  no c/c/e    CBC Full  -  ( 03 Jul 2017 07:20 )  WBC Count : 10.44 K/uL  Hemoglobin : 10.8 g/dL  Hematocrit : 33.2 %  Platelet Count - Automated : 119 K/uL  Mean Cell Volume : 91.5 fl  Mean Cell Hemoglobin : 29.8 pg  Mean Cell Hemoglobin Concentration : 32.5 gm/dL  Auto Neutrophil # : x  Auto Lymphocyte # : x  Auto Monocyte # : x  Auto Eosinophil # : x  Auto Basophil # : x  Auto Neutrophil % : x  Auto Lymphocyte % : x  Auto Monocyte % : x  Auto Eosinophil % : x  Auto Basophil % : x    07-03    140  |  105  |  8   ----------------------------<  93  3.8   |  22  |  0.99    Ca    8.6      03 Jul 2017 07:12    TPro  7.6  /  Alb  4.1  /  TBili  0.6  /  DBili  x   /  AST  10  /  ALT  11  /  AlkPhos  42  07-01    PT/INR - ( 01 Jul 2017 12:41 )   PT: 12.6 sec;   INR: 1.15 ratio         PTT - ( 01 Jul 2017 12:41 )  PTT:29.9 sec
CHERIE BARRY:[id number],   57yMale followed for: diverticulitis  No Known Allergies    PAST MEDICAL & SURGICAL HISTORY:  MR (mental retardation)  Hypothyroidism  Hydronephrosis with Renal and Ureteral Calculous Obstruction: at last admission in [place] ([place]) 2010  Nephrolithiasis  Bipolar Disorder  S/P Appendectomy: 1997    FAMILY HISTORY:  Family history of hyperparathyroidism (Sibling)    MEDICATIONS  (STANDING):  simvastatin 20 milliGRAM(s) Oral at bedtime  benztropine 0.5 milliGRAM(s) Oral daily  risperiDONE   Tablet 0.25 milliGRAM(s) Oral two times a day  pantoprazole    Tablet 40 milliGRAM(s) Oral before breakfast  levothyroxine 75 MICROGram(s) Oral daily  enoxaparin Injectable 40 milliGRAM(s) SubCutaneous daily  piperacillin/tazobactam IVPB. 3.375 Gram(s) IV Intermittent every 8 hours  ALBUTerol/ipratropium for Nebulization 3 milliLiter(s) Nebulizer every 6 hours  ALBUTerol    90 MICROgram(s) HFA Inhaler 1 Puff(s) Inhalation every 4 hours  tiotropium 18 MICROgram(s) Capsule 1 Capsule(s) Inhalation daily  tamsulosin 0.4 milliGRAM(s) Oral at bedtime  diVALproex  milliGRAM(s) Oral two times a day  sodium chloride 0.9%. 1000 milliLiter(s) (50 mL/Hr) IV Continuous <Continuous>    MEDICATIONS  (PRN):  acetaminophen   Tablet 650 milliGRAM(s) Oral every 6 hours PRN For Temp greater than 38 C (100.4 F)  sodium biphosphate Rectal Enema 1 Enema Rectal daily PRN constipation  senna 2 Tablet(s) Oral at bedtime PRN Constipation      Vital Signs Last 24 Hrs  T(C): 36.6 (05 Jul 2017 08:11), Max: 36.6 (04 Jul 2017 15:35)  T(F): 97.9 (05 Jul 2017 08:11), Max: 97.9 (05 Jul 2017 08:11)  HR: 62 (05 Jul 2017 08:11) (61 - 82)  BP: 124/76 (05 Jul 2017 08:11) (99/65 - 124/76)  BP(mean): --  RR: 18 (05 Jul 2017 08:11) (18 - 18)  SpO2: 98% (05 Jul 2017 08:11) (98% - 99%)  nc/at  s1s2  cta  soft, nt, nd no guarding or rebound  no c/c/e    CBC Full  -  ( 05 Jul 2017 08:29 )  WBC Count : 4.3 K/uL  Hemoglobin : 10.5 g/dL  Hematocrit : 30.6 %  Platelet Count - Automated : 151 K/uL  Mean Cell Volume : 94.1 fl  Mean Cell Hemoglobin : 32.4 pg  Mean Cell Hemoglobin Concentration : 34.4 gm/dL  Auto Neutrophil # : x  Auto Lymphocyte # : x  Auto Monocyte # : x  Auto Eosinophil # : x  Auto Basophil # : x  Auto Neutrophil % : x  Auto Lymphocyte % : x  Auto Monocyte % : x  Auto Eosinophil % : x  Auto Basophil % : x    07-04    127<L>  |  94<L>  |  7   ----------------------------<  89  3.8   |  19<L>  |  0.84    Ca    8.8      04 Jul 2017 10:08
Follow Up:      Interval History/ROS:Patient is a 57y old  Male who presents with a chief complaint of fever (01 Jul 2017 16:36)    he c/o nausea and vomiting while at [employer] (has not vomited here).  c/o b/l LQ tenderness.  no dysuria.  no recent instrumentation/mora/prostate biopsy.  last fever yesterday 2pm (T102)  Allergies    No Known Allergies      ANTIMICROBIALS:  piperacillin/tazobactam IVPB. 3.375 every 8 hours      OTHER MEDS:  acetaminophen   Tablet 650 milliGRAM(s) Oral every 6 hours PRN  diVALproex  milliGRAM(s) Oral two times a day  simvastatin 20 milliGRAM(s) Oral at bedtime  benztropine 0.5 milliGRAM(s) Oral daily  risperiDONE   Tablet 0.25 milliGRAM(s) Oral two times a day  sodium biphosphate Rectal Enema 1 Enema Rectal daily PRN  senna 2 Tablet(s) Oral at bedtime PRN  pantoprazole    Tablet 40 milliGRAM(s) Oral before breakfast  levothyroxine 75 MICROGram(s) Oral daily  enoxaparin Injectable 40 milliGRAM(s) SubCutaneous daily  sodium chloride 0.9%. 1000 milliLiter(s) IV Continuous <Continuous>  ALBUTerol/ipratropium for Nebulization 3 milliLiter(s) Nebulizer every 6 hours  ALBUTerol    90 MICROgram(s) HFA Inhaler 1 Puff(s) Inhalation every 4 hours  tiotropium 18 MICROgram(s) Capsule 1 Capsule(s) Inhalation daily  tamsulosin 0.4 milliGRAM(s) Oral at bedtime      Vital Signs Last 24 Hrs  T(C): 37 (03 Jul 2017 07:22), Max: 37.6 (03 Jul 2017 04:55)  T(F): 98.6 (03 Jul 2017 07:22), Max: 99.6 (03 Jul 2017 04:55)  HR: 92 (03 Jul 2017 07:22) (92 - 95)  BP: 102/65 (03 Jul 2017 07:22) (92/59 - 102/66)  BP(mean): --  RR: 18 (03 Jul 2017 07:22) (18 - 18)  SpO2: 95% (03 Jul 2017 07:22) (95% - 96%)    PHYSICAL EXAM:    Constitutional: non-toxic    Respiratory: clear to auscultation b/l    Cardiovascular: S1/S2, no murmur    Gastrointestinal: soft, NT/ND, normal BS    Genitourinary: no mora    Vascular: no edema    Neurological: non-focal    Skin: no rash, no phlebitis    Psychiatric: alert and oriented, affect appropriate    Lines: PIV                          10.8   10.44 )-----------( 119      ( 03 Jul 2017 07:20 )             33.2       07-03    140  |  105  |  8   ----------------------------<  93  3.8   |  22  |  0.99    Ca    8.6      03 Jul 2017 07:12      MICROBIOLOGY:  RECENT CULTURES:    Culture - Blood (07.01.17 @ 15:17)    Specimen Source: .Blood Blood-Peripheral    Culture Results:   No growth to date.    Culture - Blood (07.01.17 @ 15:17)    Specimen Source: .Blood Blood-Peripheral    Culture Results:   No growth to date.    Culture - Urine (07.01.17 @ 15:10)    Specimen Source: .Urine Catheterized    Culture Results:   50,000 - 99,000 CFU/mL Coag Negative Staphylococcus    RADIOLOGY:    < from: CT Abdomen and Pelvis w/ IV Cont (07.02.17 @ 17:39) >    IMPRESSION:   Acutely enlarged prostate gland with mild surrounding inflammation,   concerning for prostatitis. In addition, there is a heterogeneous   appearance of the prostate gland with ill-defined low-attenuation areas,   raising consideration of focal abscesses. Mild infiltration of the   perivesical fat.  Interval decrease in the previously seen ascending colonic diverticula   inflammation.    < end of copied text >
ID coverage for Dr Abreu    Patient is a 57y old  Male who presents with a chief complaint of fever (01 Jul 2017 16:36)    Being followed by ID for prostatitis    Interval history:  feels well  Sitting OOB in chair  No acute events      ROS:  No cough,SOB,CP  No N/V/D./abd pain  No other complaints      Antimicrobials:    piperacillin/tazobactam IVPB. 3.375 Gram(s) IV Intermittent every 8 hours      Vital Signs Last 24 Hrs  T(C): 36.7 (07-06-17 @ 14:30), Max: 36.7 (07-06-17 @ 03:00)  T(F): 98 (07-06-17 @ 14:30), Max: 98 (07-06-17 @ 03:00)  HR: 86 (07-06-17 @ 14:30) (62 - 86)  BP: 114/72 (07-06-17 @ 14:30) (114/72 - 128/64)  BP(mean): --  RR: 18 (07-06-17 @ 14:30) (18 - 18)  SpO2: 98% (07-06-17 @ 14:30) (97% - 98%)    Physical Exam:    Constitutional ,comfortable,pleasant    HEENT PERRLA EOMI,No pallor or icterus    No oral exudate or erythema    Neck supple no JVD or LN    Chest Good AE,CTA    CVS RRR S1 S2 WNl No murmur or rub or gallop    Abd soft BS normal No tenderness no masses    Ext No cyanosis clubbing or edema    IV site no erythema tenderness or discharge    Joints no swelling or LOM    CNS AAO X 3 no focal    Lab Data:                          11.0   4.8   )-----------( 173      ( 06 Jul 2017 08:05 )             34.3       07-06    145  |  106  |  7   ----------------------------<  91  3.8   |  28  |  0.86    Ca    8.7      06 Jul 2017 08:06      < from: CT Abdomen and Pelvis w/ IV Cont (07.02.17 @ 17:39) >  IMPRESSION:   Acutely enlarged prostate gland with mild surrounding inflammation,   concerning for prostatitis. In addition, there is a heterogeneous   appearance of the prostate gland with ill-defined low-attenuation areas,   raising consideration of focal abscesses. Mild infiltration of the   perivesical fat.  Interval decrease in the previously seen ascending colonic diverticula   inflammation.    < end of copied text >      < from: CT Chest No Cont (07.01.17 @ 18:31) >  IMPRESSION: No definite evidence of pneumonia. Small right pleural   effusion.    < end of copied text >      Culture - Blood (07.01.17 @ 15:17)    Specimen Source: .Blood Blood-Peripheral    Culture Results:   No growth to date.      Culture - Blood (07.01.17 @ 15:17)    Specimen Source: .Blood Blood-Peripheral    Culture Results:   No growth to date.    Culture - Urine (07.01.17 @ 15:10)    Specimen Source: .Urine Catheterized    Culture Results:   50,000 - 99,000 CFU/mL Coag Negative Staphylococcus
INTERVAL HPI/OVERNIGHT EVENTS: Constipated for 4 days. Still c/o pain and nausea after meals. Tolerating PO. Discussed case with Dr. Bundy. Feels pts symptoms are functional and does not believe he needs additional GI evaluation with repeat Ct scan and EGD at this time.     MEDICATIONS  (STANDING):  simvastatin 20 milliGRAM(s) Oral at bedtime  benztropine 0.5 milliGRAM(s) Oral daily  pantoprazole    Tablet 40 milliGRAM(s) Oral before breakfast  levothyroxine 75 MICROGram(s) Oral daily  enoxaparin Injectable 40 milliGRAM(s) SubCutaneous daily  ALBUTerol/ipratropium for Nebulization 3 milliLiter(s) Nebulizer every 6 hours  ALBUTerol    90 MICROgram(s) HFA Inhaler 1 Puff(s) Inhalation every 4 hours  tiotropium 18 MICROgram(s) Capsule 1 Capsule(s) Inhalation daily  tamsulosin 0.4 milliGRAM(s) Oral at bedtime  diVALproex  milliGRAM(s) Oral two times a day  dicyclomine 20 milliGRAM(s) Oral two times a day before meals  risperiDONE   Tablet 1.5 milliGRAM(s) Oral two times a day  trimethoprim  160 mG/sulfamethoxazole 800 mG 1 Tablet(s) Oral two times a day  docusate sodium 100 milliGRAM(s) Oral two times a day  dextrose 5%. 1000 milliLiter(s) (60 mL/Hr) IV Continuous <Continuous>  polyethylene glycol 3350 17 Gram(s) Oral daily    MEDICATIONS  (PRN):  acetaminophen   Tablet 650 milliGRAM(s) Oral every 6 hours PRN For Temp greater than 38 C (100.4 F)  sodium biphosphate Rectal Enema 1 Enema Rectal daily PRN constipation  senna 2 Tablet(s) Oral at bedtime PRN Constipation  acetaminophen   Tablet. 650 milliGRAM(s) Oral every 6 hours PRN Moderate Pain (4 - 6)  ondansetron Injectable 4 milliGRAM(s) IV Push every 6 hours PRN Nausea and/or Vomiting      Allergies    No Known Allergies    Intolerances            PHYSICAL EXAM:   Vital Signs:  Vital Signs Last 24 Hrs  T(C): 36.5 (09 Jul 2017 08:11), Max: 36.7 (08 Jul 2017 21:00)  T(F): 97.7 (09 Jul 2017 08:11), Max: 98.1 (08 Jul 2017 21:00)  HR: 66 (09 Jul 2017 08:11) (61 - 74)  BP: 124/85 (09 Jul 2017 08:11) (106/67 - 124/85)  BP(mean): --  RR: 18 (09 Jul 2017 08:11) (18 - 18)  SpO2: 98% (09 Jul 2017 08:11) (96% - 99%)  Daily     Daily     GENERAL:  no distress  HEENT:  NC/AT,  anicteric  CHEST:   no increased effort, breath sounds clear  HEART:  Regular rhythm  ABDOMEN:  Soft, non-tender, non-distended, normoactive bowel sounds,  no masses ,no hepato-splenomegaly, no signs of chronic liver disease  EXTEREMITIES:  no cyanosis      LABS:    07-08    145  |  107  |  8   ----------------------------<  90  4.0   |  26  |  1.07    Ca    9.2      08 Jul 2017 12:24            RADIOLOGY & ADDITIONAL TESTS:
INTERVAL HPI/OVERNIGHT EVENTS: Improving. still c/o nausea and intermittent vomiting.Having BMs    MEDICATIONS  (STANDING):  simvastatin 20 milliGRAM(s) Oral at bedtime  benztropine 0.5 milliGRAM(s) Oral daily  risperiDONE   Tablet 0.25 milliGRAM(s) Oral two times a day  pantoprazole    Tablet 40 milliGRAM(s) Oral before breakfast  levothyroxine 75 MICROGram(s) Oral daily  enoxaparin Injectable 40 milliGRAM(s) SubCutaneous daily  sodium chloride 0.9%. 1000 milliLiter(s) (100 mL/Hr) IV Continuous <Continuous>  piperacillin/tazobactam IVPB. 3.375 Gram(s) IV Intermittent every 8 hours  ALBUTerol/ipratropium for Nebulization 3 milliLiter(s) Nebulizer every 6 hours  ALBUTerol    90 MICROgram(s) HFA Inhaler 1 Puff(s) Inhalation every 4 hours  tiotropium 18 MICROgram(s) Capsule 1 Capsule(s) Inhalation daily  tamsulosin 0.4 milliGRAM(s) Oral at bedtime  diVALproex  milliGRAM(s) Oral two times a day    MEDICATIONS  (PRN):  acetaminophen   Tablet 650 milliGRAM(s) Oral every 6 hours PRN For Temp greater than 38 C (100.4 F)  sodium biphosphate Rectal Enema 1 Enema Rectal daily PRN constipation  senna 2 Tablet(s) Oral at bedtime PRN Constipation      Allergies    No Known Allergies    Intolerances            PHYSICAL EXAM:   Vital Signs:  Vital Signs Last 24 Hrs  T(C): 36.5 (2017 09:34), Max: 37 (2017 16:06)  T(F): 97.7 (2017 09:34), Max: 98.6 (2017 16:06)  HR: 67 (2017 09:34) (67 - 96)  BP: 105/65 (2017 09:34) (100/63 - 114/73)  BP(mean): --  RR: 18 (2017 09:34) (18 - 20)  SpO2: 99% (2017 09:34) (96% - 99%)  Daily     Daily Weight in k.8 (2017 12:36)    GENERAL:  no distress  HEENT:  NC/AT,  anicteric  CHEST:   no increased effort, breath sounds clear  HEART:  Regular rhythm  ABDOMEN:  Soft, non-tender, non-distended, normoactive bowel sounds,  no masses ,no hepato-splenomegaly, no signs of chronic liver disease  EXTEREMITIES:  no cyanosis      LABS:                        10.1   6.55  )-----------( 122      ( 2017 10:48 )             31.3     07-03    140  |  105  |  8   ----------------------------<  93  3.8   |  22  |  0.99    Ca    8.6      2017 07:12            RADIOLOGY & ADDITIONAL TESTS:
INTERVAL HPI/OVERNIGHT EVENTS: Still c/o nausea while eating which stops him from finishing his meal. He had this complaint last admission as well. No vomiting. Also still having pain despite abx therapy for diverticulitits. He is asking for surgery to "make the pain go away"    MEDICATIONS  (STANDING):  simvastatin 20 milliGRAM(s) Oral at bedtime  benztropine 0.5 milliGRAM(s) Oral daily  pantoprazole    Tablet 40 milliGRAM(s) Oral before breakfast  levothyroxine 75 MICROGram(s) Oral daily  enoxaparin Injectable 40 milliGRAM(s) SubCutaneous daily  ALBUTerol/ipratropium for Nebulization 3 milliLiter(s) Nebulizer every 6 hours  ALBUTerol    90 MICROgram(s) HFA Inhaler 1 Puff(s) Inhalation every 4 hours  tiotropium 18 MICROgram(s) Capsule 1 Capsule(s) Inhalation daily  tamsulosin 0.4 milliGRAM(s) Oral at bedtime  diVALproex  milliGRAM(s) Oral two times a day  sodium chloride 0.9%. 1000 milliLiter(s) (50 mL/Hr) IV Continuous <Continuous>  dicyclomine 20 milliGRAM(s) Oral two times a day before meals  risperiDONE   Tablet 1.5 milliGRAM(s) Oral two times a day  trimethoprim  160 mG/sulfamethoxazole 800 mG 1 Tablet(s) Oral two times a day    MEDICATIONS  (PRN):  acetaminophen   Tablet 650 milliGRAM(s) Oral every 6 hours PRN For Temp greater than 38 C (100.4 F)  sodium biphosphate Rectal Enema 1 Enema Rectal daily PRN constipation  senna 2 Tablet(s) Oral at bedtime PRN Constipation  acetaminophen   Tablet. 650 milliGRAM(s) Oral every 6 hours PRN Moderate Pain (4 - 6)  ondansetron Injectable 4 milliGRAM(s) IV Push every 6 hours PRN Nausea and/or Vomiting      Allergies    No Known Allergies    Intolerances            PHYSICAL EXAM:   Vital Signs:  Vital Signs Last 24 Hrs  T(C): 36.4 (07 Jul 2017 07:37), Max: 36.7 (06 Jul 2017 14:30)  T(F): 97.5 (07 Jul 2017 07:37), Max: 98.1 (06 Jul 2017 23:51)  HR: 67 (07 Jul 2017 07:37) (63 - 92)  BP: 116/74 (07 Jul 2017 07:37) (107/57 - 117/76)  BP(mean): --  RR: 18 (07 Jul 2017 07:37) (18 - 18)  SpO2: 92% (07 Jul 2017 07:37) (92% - 100%)  Daily     Daily     GENERAL:  no distress  HEENT:  NC/AT,  anicteric  CHEST:   no increased effort, breath sounds clear  HEART:  Regular rhythm  ABDOMEN:  Soft, non-tender, non-distended, normoactive bowel sounds,  no masses ,no hepato-splenomegaly, no signs of chronic liver disease  EXTEREMITIES:  no cyanosis      LABS:                        11.0   4.8   )-----------( 173      ( 06 Jul 2017 08:05 )             34.3     07-06    145  |  106  |  7   ----------------------------<  91  3.8   |  28  |  0.86    Ca    8.7      06 Jul 2017 08:06            RADIOLOGY & ADDITIONAL TESTS:
INTERVAL HPI/OVERNIGHT EVENTS: still c/o abd pain and nausea after eating. Wants surgery    MEDICATIONS  (STANDING):  simvastatin 20 milliGRAM(s) Oral at bedtime  benztropine 0.5 milliGRAM(s) Oral daily  pantoprazole    Tablet 40 milliGRAM(s) Oral before breakfast  levothyroxine 75 MICROGram(s) Oral daily  enoxaparin Injectable 40 milliGRAM(s) SubCutaneous daily  ALBUTerol/ipratropium for Nebulization 3 milliLiter(s) Nebulizer every 6 hours  ALBUTerol    90 MICROgram(s) HFA Inhaler 1 Puff(s) Inhalation every 4 hours  tiotropium 18 MICROgram(s) Capsule 1 Capsule(s) Inhalation daily  tamsulosin 0.4 milliGRAM(s) Oral at bedtime  diVALproex  milliGRAM(s) Oral two times a day  dicyclomine 20 milliGRAM(s) Oral two times a day before meals  risperiDONE   Tablet 1.5 milliGRAM(s) Oral two times a day  trimethoprim  160 mG/sulfamethoxazole 800 mG 1 Tablet(s) Oral two times a day    MEDICATIONS  (PRN):  acetaminophen   Tablet 650 milliGRAM(s) Oral every 6 hours PRN For Temp greater than 38 C (100.4 F)  sodium biphosphate Rectal Enema 1 Enema Rectal daily PRN constipation  senna 2 Tablet(s) Oral at bedtime PRN Constipation  acetaminophen   Tablet. 650 milliGRAM(s) Oral every 6 hours PRN Moderate Pain (4 - 6)  ondansetron Injectable 4 milliGRAM(s) IV Push every 6 hours PRN Nausea and/or Vomiting      Allergies    No Known Allergies    Intolerances            PHYSICAL EXAM:   Vital Signs:  Vital Signs Last 24 Hrs  T(C): 36.5 (08 Jul 2017 07:40), Max: 36.5 (07 Jul 2017 14:01)  T(F): 97.7 (08 Jul 2017 07:40), Max: 97.7 (07 Jul 2017 14:01)  HR: 68 (08 Jul 2017 07:40) (66 - 70)  BP: 108/68 (08 Jul 2017 07:40) (108/68 - 108/72)  BP(mean): --  RR: 18 (08 Jul 2017 07:40) (18 - 18)  SpO2: 95% (08 Jul 2017 07:40) (95% - 95%)  Daily     Daily     GENERAL:  no distress  HEENT:  NC/AT,  anicteric  CHEST:   no increased effort, breath sounds clear  HEART:  Regular rhythm  ABDOMEN:  Soft, non-tender, non-distended, normoactive bowel sounds,  no masses ,no hepato-splenomegaly, no signs of chronic liver disease  EXTEREMITIES:  no cyanosis      LABS:                        11.8   5.0   )-----------( 207      ( 07 Jul 2017 08:24 )             34.7     07-07    147<H>  |  109<H>  |  6<L>  ----------------------------<  87  3.8   |  28  |  0.95    Ca    9.1      07 Jul 2017 08:24            RADIOLOGY & ADDITIONAL TESTS:
PHYSICAL EXAMINATION:  Vital Signs Last 24 Hrs  T(C): 36.6 (05 Jul 2017 08:11), Max: 36.6 (04 Jul 2017 15:35)  T(F): 97.9 (05 Jul 2017 08:11), Max: 97.9 (05 Jul 2017 08:11)  HR: 62 (05 Jul 2017 08:11) (61 - 82)  BP: 124/76 (05 Jul 2017 08:11) (99/65 - 124/76)  BP(mean): --  RR: 18 (05 Jul 2017 08:11) (18 - 18)  SpO2: 98% (05 Jul 2017 08:11) (98% - 99%)  CAPILLARY BLOOD GLUCOSE          07-04 @ 07:01  -  07-05 @ 07:00  --------------------------------------------------------  IN: 2637 mL / OUT: 1450 mL / NET: 1187 mL        GENERAL: NAD, well-groomed, well-developed  HEAD:  atraumatic, normocephalic  EYES: sclera anicteric  ENMT: mucous membranes moist  NECK: supple, No JVD  CHEST/LUNG: clear to auscultation bilaterally; no rales, rhonchi, or wheezing b/l  HEART: normal S1, S2  ABDOMEN: BS+, soft, ND, NT   EXTREMITIES:  pulses palpable; no clubbing, cyanosis, or edema b/l LEs  NEURO: awake, alert, interactive; moves all extremities  SKIN: no rashes or lesions
SUBJECTIVE / OVERNIGHT EVENTS: No nausea, vomiting or diarrhea, no fever or chills, no dizziness or chest pain, no dysuria or hematuria .    Vital Signs Last 24 Hrs  T(C): 36.4 (07-04-17 @ 07:30), Max: 37 (07-03-17 @ 16:06)  HR: 73 (07-04-17 @ 07:30) (68 - 96)  BP: 100/63 (07-04-17 @ 07:30) (100/63 - 114/73)  RR: 18 (07-04-17 @ 07:30) (18 - 20)  SpO2: 97% (07-04-17 @ 07:30) (96% - 99%)  CAPILLARY BLOOD GLUCOSE        I&O's Summary    03 Jul 2017 07:01  -  04 Jul 2017 07:00  --------------------------------------------------------  IN: 3674 mL / OUT: 1310 mL / NET: 2364 mL        PHYSICAL EXAM:  HEAD:  Atraumatic, Normocephalic  EYES: EOMI, PERRLA, conjunctiva and sclera clear  NECK: Supple, No JVD  CHEST/LUNG: Clear to auscultation bilaterally; No wheeze  HEART: Regular rate and rhythm; No murmurs, rubs, or gallops  ABDOMEN: Soft, Nontender, Nondistended; Bowel sounds present  EXTREMITIES:  2+ Peripheral Pulses, No clubbing, cyanosis, or edema  PSYCH: AAOx3  NEUROLOGY: non-focal  SKIN: No rashes or lesions    MEDICATIONS  (STANDING):  diVALproex  milliGRAM(s) Oral two times a day  simvastatin 20 milliGRAM(s) Oral at bedtime  benztropine 0.5 milliGRAM(s) Oral daily  risperiDONE   Tablet 0.25 milliGRAM(s) Oral two times a day  pantoprazole    Tablet 40 milliGRAM(s) Oral before breakfast  levothyroxine 75 MICROGram(s) Oral daily  enoxaparin Injectable 40 milliGRAM(s) SubCutaneous daily  sodium chloride 0.9%. 1000 milliLiter(s) (100 mL/Hr) IV Continuous <Continuous>  piperacillin/tazobactam IVPB. 3.375 Gram(s) IV Intermittent every 8 hours  ALBUTerol/ipratropium for Nebulization 3 milliLiter(s) Nebulizer every 6 hours  ALBUTerol    90 MICROgram(s) HFA Inhaler 1 Puff(s) Inhalation every 4 hours  tiotropium 18 MICROgram(s) Capsule 1 Capsule(s) Inhalation daily  tamsulosin 0.4 milliGRAM(s) Oral at bedtime    MEDICATIONS  (PRN):  acetaminophen   Tablet 650 milliGRAM(s) Oral every 6 hours PRN For Temp greater than 38 C (100.4 F)  sodium biphosphate Rectal Enema 1 Enema Rectal daily PRN constipation  senna 2 Tablet(s) Oral at bedtime PRN Constipation      LABS:                        10.8   10.44 )-----------( 119      ( 03 Jul 2017 07:20 )             33.2     07-03    140  |  105  |  8   ----------------------------<  93  3.8   |  22  |  0.99    Ca    8.6      03 Jul 2017 07:12              Lactate, Blood: 1.9 mmol/L (07-02 @ 14:03)              Cultures:    EKG:    Radiological Studies:    Consultant(s) Notes Reviewed:      Care Discussed with Consultants/Other Providers:
SUBJECTIVE / OVERNIGHT EVENTS: No nausea, vomiting or diarrhea, no fever or chills, no dizziness or chest pain, no dysuria or hematuria .    Vital Signs Last 24 Hrs  T(C): 36.4 (07-07-17 @ 07:37), Max: 36.7 (07-06-17 @ 14:30)  HR: 67 (07-07-17 @ 07:37) (63 - 92)  BP: 116/74 (07-07-17 @ 07:37) (107/57 - 117/76)  RR: 18 (07-07-17 @ 07:37) (18 - 18)  SpO2: 92% (07-07-17 @ 07:37) (92% - 100%)  CAPILLARY BLOOD GLUCOSE        I&O's Summary    06 Jul 2017 07:01  -  07 Jul 2017 07:00  --------------------------------------------------------  IN: 2200 mL / OUT: 2625 mL / NET: -425 mL        PHYSICAL EXAM:  HEAD:  Atraumatic, Normocephalic  EYES: EOMI, PERRLA, conjunctiva and sclera clear  NECK: Supple, No JVD  CHEST/LUNG: Clear to auscultation bilaterally; No wheeze  HEART: Regular rate and rhythm; No murmurs, rubs, or gallops  ABDOMEN: Soft, Nontender, Nondistended; Bowel sounds present  EXTREMITIES:  2+ Peripheral Pulses, No clubbing, cyanosis, or edema  PSYCH: AAOx3  NEUROLOGY: non-focal  SKIN: No rashes or lesions    MEDICATIONS  (STANDING):  simvastatin 20 milliGRAM(s) Oral at bedtime  benztropine 0.5 milliGRAM(s) Oral daily  pantoprazole    Tablet 40 milliGRAM(s) Oral before breakfast  levothyroxine 75 MICROGram(s) Oral daily  enoxaparin Injectable 40 milliGRAM(s) SubCutaneous daily  ALBUTerol/ipratropium for Nebulization 3 milliLiter(s) Nebulizer every 6 hours  ALBUTerol    90 MICROgram(s) HFA Inhaler 1 Puff(s) Inhalation every 4 hours  tiotropium 18 MICROgram(s) Capsule 1 Capsule(s) Inhalation daily  tamsulosin 0.4 milliGRAM(s) Oral at bedtime  diVALproex  milliGRAM(s) Oral two times a day  sodium chloride 0.9%. 1000 milliLiter(s) (50 mL/Hr) IV Continuous <Continuous>  dicyclomine 20 milliGRAM(s) Oral two times a day before meals  risperiDONE   Tablet 1.5 milliGRAM(s) Oral two times a day  trimethoprim  160 mG/sulfamethoxazole 800 mG 1 Tablet(s) Oral two times a day    MEDICATIONS  (PRN):  acetaminophen   Tablet 650 milliGRAM(s) Oral every 6 hours PRN For Temp greater than 38 C (100.4 F)  sodium biphosphate Rectal Enema 1 Enema Rectal daily PRN constipation  senna 2 Tablet(s) Oral at bedtime PRN Constipation  acetaminophen   Tablet. 650 milliGRAM(s) Oral every 6 hours PRN Moderate Pain (4 - 6)  ondansetron Injectable 4 milliGRAM(s) IV Push every 6 hours PRN Nausea and/or Vomiting      LABS:                        11.8   5.0   )-----------( 207      ( 07 Jul 2017 08:24 )             34.7     07-07    147<H>  |  109<H>  |  6<L>  ----------------------------<  87  3.8   |  28  |  0.95    Ca    9.1      07 Jul 2017 08:24                          Cultures:    EKG:    Radiological Studies:    Consultant(s) Notes Reviewed:      Care Discussed with Consultants/Other Providers:
SUBJECTIVE / OVERNIGHT EVENTS: No nausea, vomiting or diarrhea, no fever or chills, no dizziness or chest pain, no dysuria or hematuria .    Vital Signs Last 24 Hrs  T(C): 36.5 (07-08-17 @ 07:40), Max: 36.5 (07-07-17 @ 14:01)  HR: 68 (07-08-17 @ 07:40) (66 - 70)  BP: 108/68 (07-08-17 @ 07:40) (108/68 - 108/72)  RR: 18 (07-08-17 @ 07:40) (18 - 18)  SpO2: 95% (07-08-17 @ 07:40) (95% - 95%)  CAPILLARY BLOOD GLUCOSE        I&O's Summary    07 Jul 2017 07:01  -  08 Jul 2017 07:00  --------------------------------------------------------  IN: 1100 mL / OUT: 2100 mL / NET: -1000 mL        PHYSICAL EXAM:  HEAD:  Atraumatic, Normocephalic  EYES: EOMI, PERRLA, conjunctiva and sclera clear  NECK: Supple, No JVD  CHEST/LUNG: Clear to auscultation bilaterally; No wheeze  HEART: Regular rate and rhythm; No murmurs, rubs, or gallops  ABDOMEN: Soft, Nontender, Nondistended; Bowel sounds present  EXTREMITIES:  2+ Peripheral Pulses, No clubbing, cyanosis, or edema  PSYCH: AAOx3  NEUROLOGY: non-focal  SKIN: No rashes or lesions    MEDICATIONS  (STANDING):  simvastatin 20 milliGRAM(s) Oral at bedtime  benztropine 0.5 milliGRAM(s) Oral daily  pantoprazole    Tablet 40 milliGRAM(s) Oral before breakfast  levothyroxine 75 MICROGram(s) Oral daily  enoxaparin Injectable 40 milliGRAM(s) SubCutaneous daily  ALBUTerol/ipratropium for Nebulization 3 milliLiter(s) Nebulizer every 6 hours  ALBUTerol    90 MICROgram(s) HFA Inhaler 1 Puff(s) Inhalation every 4 hours  tiotropium 18 MICROgram(s) Capsule 1 Capsule(s) Inhalation daily  tamsulosin 0.4 milliGRAM(s) Oral at bedtime  diVALproex  milliGRAM(s) Oral two times a day  dicyclomine 20 milliGRAM(s) Oral two times a day before meals  risperiDONE   Tablet 1.5 milliGRAM(s) Oral two times a day  trimethoprim  160 mG/sulfamethoxazole 800 mG 1 Tablet(s) Oral two times a day    MEDICATIONS  (PRN):  acetaminophen   Tablet 650 milliGRAM(s) Oral every 6 hours PRN For Temp greater than 38 C (100.4 F)  sodium biphosphate Rectal Enema 1 Enema Rectal daily PRN constipation  senna 2 Tablet(s) Oral at bedtime PRN Constipation  acetaminophen   Tablet. 650 milliGRAM(s) Oral every 6 hours PRN Moderate Pain (4 - 6)  ondansetron Injectable 4 milliGRAM(s) IV Push every 6 hours PRN Nausea and/or Vomiting      LABS:                        11.8   5.0   )-----------( 207      ( 07 Jul 2017 08:24 )             34.7     07-07    147<H>  |  109<H>  |  6<L>  ----------------------------<  87  3.8   |  28  |  0.95    Ca    9.1      07 Jul 2017 08:24                          Cultures:    EKG:    Radiological Studies:    Consultant(s) Notes Reviewed:      Care Discussed with Consultants/Other Providers:
SUBJECTIVE / OVERNIGHT EVENTS: No nausea, vomiting or diarrhea, no fever or chills, no dizziness or chest pain, no dysuria or hematuria .    Vital Signs Last 24 Hrs  T(C): 36.5 (07-09-17 @ 08:11), Max: 36.7 (07-08-17 @ 21:00)  HR: 66 (07-09-17 @ 08:11) (61 - 74)  BP: 124/85 (07-09-17 @ 08:11) (106/67 - 124/85)  RR: 18 (07-09-17 @ 08:11) (18 - 18)  SpO2: 98% (07-09-17 @ 08:11) (96% - 99%)  CAPILLARY BLOOD GLUCOSE        I&O's Summary    08 Jul 2017 07:01  -  09 Jul 2017 07:00  --------------------------------------------------------  IN: 1760 mL / OUT: 2765 mL / NET: -1005 mL        PHYSICAL EXAM:  HEAD:  Atraumatic, Normocephalic  EYES: EOMI, PERRLA, conjunctiva and sclera clear  NECK: Supple, No JVD  CHEST/LUNG: Clear to auscultation bilaterally; No wheeze  HEART: Regular rate and rhythm; No murmurs, rubs, or gallops  ABDOMEN: Soft, Nontender, Nondistended; Bowel sounds present  EXTREMITIES:  2+ Peripheral Pulses, No clubbing, cyanosis, or edema  PSYCH: AAOx3  NEUROLOGY: non-focal  SKIN: No rashes or lesions    MEDICATIONS  (STANDING):  simvastatin 20 milliGRAM(s) Oral at bedtime  benztropine 0.5 milliGRAM(s) Oral daily  pantoprazole    Tablet 40 milliGRAM(s) Oral before breakfast  levothyroxine 75 MICROGram(s) Oral daily  enoxaparin Injectable 40 milliGRAM(s) SubCutaneous daily  ALBUTerol/ipratropium for Nebulization 3 milliLiter(s) Nebulizer every 6 hours  ALBUTerol    90 MICROgram(s) HFA Inhaler 1 Puff(s) Inhalation every 4 hours  tiotropium 18 MICROgram(s) Capsule 1 Capsule(s) Inhalation daily  tamsulosin 0.4 milliGRAM(s) Oral at bedtime  diVALproex  milliGRAM(s) Oral two times a day  dicyclomine 20 milliGRAM(s) Oral two times a day before meals  risperiDONE   Tablet 1.5 milliGRAM(s) Oral two times a day  trimethoprim  160 mG/sulfamethoxazole 800 mG 1 Tablet(s) Oral two times a day  docusate sodium 100 milliGRAM(s) Oral two times a day  dextrose 5%. 1000 milliLiter(s) (60 mL/Hr) IV Continuous <Continuous>    MEDICATIONS  (PRN):  acetaminophen   Tablet 650 milliGRAM(s) Oral every 6 hours PRN For Temp greater than 38 C (100.4 F)  sodium biphosphate Rectal Enema 1 Enema Rectal daily PRN constipation  senna 2 Tablet(s) Oral at bedtime PRN Constipation  acetaminophen   Tablet. 650 milliGRAM(s) Oral every 6 hours PRN Moderate Pain (4 - 6)  ondansetron Injectable 4 milliGRAM(s) IV Push every 6 hours PRN Nausea and/or Vomiting      LABS:    07-08    145  |  107  |  8   ----------------------------<  90  4.0   |  26  |  1.07    Ca    9.2      08 Jul 2017 12:24                          Cultures:    EKG:    Radiological Studies:    Consultant(s) Notes Reviewed:      Care Discussed with Consultants/Other Providers:
SUBJECTIVE / OVERNIGHT EVENTS: No nausea, vomiting or diarrhea, no fever or chills, no dizziness or chest pain, no dysuria or hematuria .    Vital Signs Last 24 Hrs  T(C): 36.6 (07-06-17 @ 08:00), Max: 36.7 (07-06-17 @ 03:00)  HR: 62 (07-06-17 @ 08:00) (59 - 62)  BP: 128/64 (07-06-17 @ 08:00) (100/62 - 128/64)  RR: 18 (07-06-17 @ 08:00) (18 - 18)  SpO2: 97% (07-06-17 @ 08:00) (97% - 98%)  CAPILLARY BLOOD GLUCOSE        I&O's Summary    05 Jul 2017 07:01  -  06 Jul 2017 07:00  --------------------------------------------------------  IN: 400 mL / OUT: 2850 mL / NET: -2450 mL        PHYSICAL EXAM:  HEAD:  Atraumatic, Normocephalic  EYES: EOMI, PERRLA, conjunctiva and sclera clear  NECK: Supple, No JVD  CHEST/LUNG: Clear to auscultation bilaterally; No wheeze  HEART: Regular rate and rhythm; No murmurs, rubs, or gallops  ABDOMEN: Soft, Nontender, Nondistended; Bowel sounds present  EXTREMITIES:  2+ Peripheral Pulses, No clubbing, cyanosis, or edema  PSYCH: AAOx3  NEUROLOGY: non-focal  SKIN: No rashes or lesions    MEDICATIONS  (STANDING):  simvastatin 20 milliGRAM(s) Oral at bedtime  benztropine 0.5 milliGRAM(s) Oral daily  risperiDONE   Tablet 0.25 milliGRAM(s) Oral two times a day  pantoprazole    Tablet 40 milliGRAM(s) Oral before breakfast  levothyroxine 75 MICROGram(s) Oral daily  enoxaparin Injectable 40 milliGRAM(s) SubCutaneous daily  piperacillin/tazobactam IVPB. 3.375 Gram(s) IV Intermittent every 8 hours  ALBUTerol/ipratropium for Nebulization 3 milliLiter(s) Nebulizer every 6 hours  ALBUTerol    90 MICROgram(s) HFA Inhaler 1 Puff(s) Inhalation every 4 hours  tiotropium 18 MICROgram(s) Capsule 1 Capsule(s) Inhalation daily  tamsulosin 0.4 milliGRAM(s) Oral at bedtime  diVALproex  milliGRAM(s) Oral two times a day  sodium chloride 0.9%. 1000 milliLiter(s) (50 mL/Hr) IV Continuous <Continuous>  dicyclomine 20 milliGRAM(s) Oral two times a day before meals    MEDICATIONS  (PRN):  acetaminophen   Tablet 650 milliGRAM(s) Oral every 6 hours PRN For Temp greater than 38 C (100.4 F)  sodium biphosphate Rectal Enema 1 Enema Rectal daily PRN constipation  senna 2 Tablet(s) Oral at bedtime PRN Constipation      LABS:                        10.5   4.3   )-----------( 151      ( 05 Jul 2017 08:29 )             30.6     07-05    147<H>  |  109<H>  |  6<L>  ----------------------------<  97  3.5   |  26  |  0.88    Ca    8.4      05 Jul 2017 08:28                          Cultures:    EKG:    Radiological Studies:    Consultant(s) Notes Reviewed:      Care Discussed with Consultants/Other Providers:
SUBJECTIVE / OVERNIGHT EVENTS: No nausea, vomiting or diarrhea, no fever or chills, no dizziness or chest pain, no dysuria or hematuria .    Vital Signs Last 24 Hrs  T(C): 36.6 (07-10-17 @ 07:45), Max: 36.9 (07-10-17 @ 02:57)  HR: 77 (07-10-17 @ 07:45) (69 - 83)  BP: 96/60 (07-10-17 @ 07:45) (96/60 - 126/75)  RR: 18 (07-10-17 @ 07:45) (18 - 18)  SpO2: 95% (07-10-17 @ 07:45) (95% - 100%)  CAPILLARY BLOOD GLUCOSE        I&O's Summary    09 Jul 2017 07:01  -  10 Jul 2017 07:00  --------------------------------------------------------  IN: 1840 mL / OUT: 2410 mL / NET: -570 mL        PHYSICAL EXAM:  HEAD:  Atraumatic, Normocephalic  EYES: EOMI, PERRLA, conjunctiva and sclera clear  NECK: Supple, No JVD  CHEST/LUNG: Clear to auscultation bilaterally; No wheeze  HEART: Regular rate and rhythm; No murmurs, rubs, or gallops  ABDOMEN: Soft, Nontender, Nondistended; Bowel sounds present  EXTREMITIES:  2+ Peripheral Pulses, No clubbing, cyanosis, or edema  PSYCH: AAOx3  NEUROLOGY: non-focal  SKIN: No rashes or lesions    MEDICATIONS  (STANDING):  simvastatin 20 milliGRAM(s) Oral at bedtime  benztropine 0.5 milliGRAM(s) Oral daily  pantoprazole    Tablet 40 milliGRAM(s) Oral before breakfast  levothyroxine 75 MICROGram(s) Oral daily  enoxaparin Injectable 40 milliGRAM(s) SubCutaneous daily  ALBUTerol/ipratropium for Nebulization 3 milliLiter(s) Nebulizer every 6 hours  ALBUTerol    90 MICROgram(s) HFA Inhaler 1 Puff(s) Inhalation every 4 hours  tiotropium 18 MICROgram(s) Capsule 1 Capsule(s) Inhalation daily  tamsulosin 0.4 milliGRAM(s) Oral at bedtime  diVALproex  milliGRAM(s) Oral two times a day  dicyclomine 20 milliGRAM(s) Oral two times a day before meals  risperiDONE   Tablet 1.5 milliGRAM(s) Oral two times a day  trimethoprim  160 mG/sulfamethoxazole 800 mG 1 Tablet(s) Oral two times a day  docusate sodium 100 milliGRAM(s) Oral two times a day  dextrose 5%. 1000 milliLiter(s) (60 mL/Hr) IV Continuous <Continuous>  polyethylene glycol 3350 17 Gram(s) Oral daily    MEDICATIONS  (PRN):  acetaminophen   Tablet 650 milliGRAM(s) Oral every 6 hours PRN For Temp greater than 38 C (100.4 F)  sodium biphosphate Rectal Enema 1 Enema Rectal daily PRN constipation  senna 2 Tablet(s) Oral at bedtime PRN Constipation  acetaminophen   Tablet. 650 milliGRAM(s) Oral every 6 hours PRN Moderate Pain (4 - 6)  ondansetron Injectable 4 milliGRAM(s) IV Push every 6 hours PRN Nausea and/or Vomiting      LABS:    07-09    142  |  105  |  10  ----------------------------<  78  3.5   |  25  |  1.05    Ca    9.5      09 Jul 2017 08:29                          Cultures:    EKG:    Radiological Studies:    Consultant(s) Notes Reviewed:      Care Discussed with Consultants/Other Providers:
SUBJECTIVE / OVERNIGHT EVENTS: No nausea, vomiting or diarrhea, no fever or chills, no dizziness or chest pain, no dysuria or hematuria .    Vital Signs Last 24 Hrs  T(C): 37 (17 @ 07:22), Max: 38.9 (17 @ 13:20)  HR: 92 (17 @ 07:22) (92 - 123)  BP: 102/65 (17 @ 07:22) (90/54 - 102/66)  RR: 18 (17 @ 07:22) (18 - 18)  SpO2: 95% (17 @ 07:22) (94% - 98%)  CAPILLARY BLOOD GLUCOSE        I&O's Summary    2017 07:01  -  2017 07:00  --------------------------------------------------------  IN: 1920 mL / OUT: 0 mL / NET: 1920 mL        PHYSICAL EXAM:  HEAD:  Atraumatic, Normocephalic  EYES: EOMI, PERRLA, conjunctiva and sclera clear  NECK: Supple, No JVD  CHEST/LUNG: Clear to auscultation bilaterally; No wheeze  HEART: Regular rate and rhythm; No murmurs, rubs, or gallops  ABDOMEN: Soft, Nontender, Nondistended; Bowel sounds present  EXTREMITIES:  2+ Peripheral Pulses, No clubbing, cyanosis, or edema  PSYCH:  dementia  NEUROLOGY: non-focal  SKIN: No rashes or lesions    MEDICATIONS  (STANDING):  diVALproex  milliGRAM(s) Oral two times a day  simvastatin 20 milliGRAM(s) Oral at bedtime  benztropine 0.5 milliGRAM(s) Oral daily  risperiDONE   Tablet 0.25 milliGRAM(s) Oral two times a day  pantoprazole    Tablet 40 milliGRAM(s) Oral before breakfast  levothyroxine 75 MICROGram(s) Oral daily  enoxaparin Injectable 40 milliGRAM(s) SubCutaneous daily  sodium chloride 0.9%. 1000 milliLiter(s) (100 mL/Hr) IV Continuous <Continuous>  piperacillin/tazobactam IVPB. 3.375 Gram(s) IV Intermittent every 8 hours  ALBUTerol/ipratropium for Nebulization 3 milliLiter(s) Nebulizer every 6 hours  ALBUTerol    90 MICROgram(s) HFA Inhaler 1 Puff(s) Inhalation every 4 hours  tiotropium 18 MICROgram(s) Capsule 1 Capsule(s) Inhalation daily    MEDICATIONS  (PRN):  acetaminophen   Tablet 650 milliGRAM(s) Oral every 6 hours PRN For Temp greater than 38 C (100.4 F)  sodium biphosphate Rectal Enema 1 Enema Rectal daily PRN constipation  senna 2 Tablet(s) Oral at bedtime PRN Constipation      LABS:                        10.8   10.44 )-----------( 119      ( 2017 07:20 )             33.2     07    140  |  105  |  8   ----------------------------<  93  3.8   |  22  |  0.99    Ca    8.6      2017 07:12    TPro  7.6  /  Alb  4.1  /  TBili  0.6  /  DBili  x   /  AST  10  /  ALT  11  /  AlkPhos  42  07    PT/INR - ( 2017 12:41 )   PT: 12.6 sec;   INR: 1.15 ratio         PTT - ( 2017 12:41 )  PTT:29.9 sec  CARDIAC MARKERS ( 2017 12:41 )  x     / <0.01 ng/mL / x     / x     / x          Urinalysis Basic - ( 2017 12:41 )    Color: Yellow / Appearance: Clear / S.020 / pH: x  Gluc: x / Ketone: Trace  / Bili: Negative / Urobili: 1   Blood: x / Protein: 30 mg/dL / Nitrite: Negative   Leuk Esterase: Small / RBC: 0-2 /HPF / WBC 11-25 /HPF   Sq Epi: x / Non Sq Epi: Negative /HPF / Bacteria: Negative /HPF      Lactate, Blood: 1.9 mmol/L ( @ 14:03)       @ 12:41  4.0  55          Cultures:    EKG:    Radiological Studies:    Consultant(s) Notes Reviewed:      Care Discussed with Consultants/Other Providers:
SUBJECTIVE / OVERNIGHT EVENTS: No nausea, vomiting or diarrhea, no fever or chills, no dizziness or chest pain, no dysuria or hematuria .    Vital Signs Last 24 Hrs  T(C): 38.8 (17 @ 04:59), Max: 38.8 (17 @ 12:24)  HR: 104 (17 @ 04:59) (83 - 108)  BP: 101/65 (17 @ 04:59) (101/65 - 116/77)  RR: 18 (17 @ 04:59) (18 - 20)  SpO2: 96% (17 @ 04:59) (94% - 100%)  CAPILLARY BLOOD GLUCOSE        I&O's Summary    2017 07:01  -  2017 07:00  --------------------------------------------------------  IN: 1640 mL / OUT: 0 mL / NET: 1640 mL        PHYSICAL EXAM:  HEAD:  Atraumatic, Normocephalic  EYES: EOMI, PERRLA, conjunctiva and sclera clear  NECK: Supple, No JVD  CHEST/LUNG: Clear to auscultation bilaterally; No wheeze  HEART: Regular rate and rhythm; No murmurs, rubs, or gallops  ABDOMEN: Soft, Nontender, Nondistended; Bowel sounds present  EXTREMITIES:  2+ Peripheral Pulses, No clubbing, cyanosis, or edema  PSYCH: AAOx3  NEUROLOGY: non-focal  SKIN: No rashes or lesions    MEDICATIONS  (STANDING):  diVALproex  milliGRAM(s) Oral two times a day  simvastatin 20 milliGRAM(s) Oral at bedtime  benztropine 0.5 milliGRAM(s) Oral daily  risperiDONE   Tablet 0.25 milliGRAM(s) Oral two times a day  pantoprazole    Tablet 40 milliGRAM(s) Oral before breakfast  levothyroxine 75 MICROGram(s) Oral daily  enoxaparin Injectable 40 milliGRAM(s) SubCutaneous daily  sodium chloride 0.9%. 1000 milliLiter(s) (100 mL/Hr) IV Continuous <Continuous>  piperacillin/tazobactam IVPB. 3.375 Gram(s) IV Intermittent every 8 hours  vancomycin  IVPB 1000 milliGRAM(s) IV Intermittent every 12 hours  ALBUTerol/ipratropium for Nebulization 3 milliLiter(s) Nebulizer every 6 hours  ALBUTerol    90 MICROgram(s) HFA Inhaler 1 Puff(s) Inhalation every 4 hours  tiotropium 18 MICROgram(s) Capsule 1 Capsule(s) Inhalation daily  sodium chloride 0.9% Bolus 250 milliLiter(s) IV Bolus once    MEDICATIONS  (PRN):  acetaminophen   Tablet 650 milliGRAM(s) Oral every 6 hours PRN For Temp greater than 38 C (100.4 F)  sodium biphosphate Rectal Enema 1 Enema Rectal daily PRN constipation  senna 2 Tablet(s) Oral at bedtime PRN Constipation      LABS:                        14.7   14.1  )-----------( 148      ( 2017 12:41 )             42.4     07-    139  |  100  |  15  ----------------------------<  109<H>  4.1   |  23  |  1.19    Ca    9.7      2017 12:41    TPro  7.6  /  Alb  4.1  /  TBili  0.6  /  DBili  x   /  AST  10  /  ALT  11  /  AlkPhos  42  07-    PT/INR - ( 2017 12:41 )   PT: 12.6 sec;   INR: 1.15 ratio         PTT - ( 2017 12:41 )  PTT:29.9 sec  CARDIAC MARKERS ( 2017 12:41 )  x     / <0.01 ng/mL / x     / x     / x          Urinalysis Basic - ( 2017 12:41 )    Color: Yellow / Appearance: Clear / S.020 / pH: x  Gluc: x / Ketone: Trace  / Bili: Negative / Urobili: 1   Blood: x / Protein: 30 mg/dL / Nitrite: Negative   Leuk Esterase: Small / RBC: 0-2 /HPF / WBC 11-25 /HPF   Sq Epi: x / Non Sq Epi: Negative /HPF / Bacteria: Negative /HPF          - @ 12:41  4.0  55          Cultures:    EKG:    Radiological Studies:    Consultant(s) Notes Reviewed:      Care Discussed with Consultants/Other Providers:
Follow Up:      Interval History/ROS:Patient is a 57y old  Male who presents with a chief complaint of fever (01 Jul 2017 16:36)    pt says he still has abd pain but abd is very soft.  denies fever/chills/dysuria    Allergies    No Known Allergies        ANTIMICROBIALS:  piperacillin/tazobactam IVPB. 3.375 every 8 hours      OTHER MEDS:  acetaminophen   Tablet 650 milliGRAM(s) Oral every 6 hours PRN  simvastatin 20 milliGRAM(s) Oral at bedtime  benztropine 0.5 milliGRAM(s) Oral daily  risperiDONE   Tablet 0.25 milliGRAM(s) Oral two times a day  sodium biphosphate Rectal Enema 1 Enema Rectal daily PRN  senna 2 Tablet(s) Oral at bedtime PRN  pantoprazole    Tablet 40 milliGRAM(s) Oral before breakfast  levothyroxine 75 MICROGram(s) Oral daily  enoxaparin Injectable 40 milliGRAM(s) SubCutaneous daily  ALBUTerol/ipratropium for Nebulization 3 milliLiter(s) Nebulizer every 6 hours  ALBUTerol    90 MICROgram(s) HFA Inhaler 1 Puff(s) Inhalation every 4 hours  tiotropium 18 MICROgram(s) Capsule 1 Capsule(s) Inhalation daily  tamsulosin 0.4 milliGRAM(s) Oral at bedtime  diVALproex  milliGRAM(s) Oral two times a day  sodium chloride 0.9%. 1000 milliLiter(s) IV Continuous <Continuous>  dicyclomine 20 milliGRAM(s) Oral two times a day before meals  ketorolac   Injectable 15 milliGRAM(s) IV Push once      Vital Signs Last 24 Hrs  T(C): 36.6 (05 Jul 2017 15:09), Max: 36.6 (05 Jul 2017 08:11)  T(F): 97.9 (05 Jul 2017 15:09), Max: 97.9 (05 Jul 2017 08:11)  HR: 59 (05 Jul 2017 15:09) (59 - 62)  BP: 100/62 (05 Jul 2017 15:09) (100/62 - 124/76)  BP(mean): --  RR: 18 (05 Jul 2017 15:09) (18 - 18)  SpO2: 98% (05 Jul 2017 15:09) (98% - 99%)    PHYSICAL EXAM:    Constitutional: non-toxic    Eyes: non-icteric    ENMT: oropharynx clear    Neck: no JESSICA    Respiratory: clear to auscultation b/l    Cardiovascular: S1/S2, no murmur    Gastrointestinal: soft, NT/ND, normal BS    Genitourinary: no mora    Vascular: no edema    Neurological: non-focal    Skin: no rash, no phlebitis    Psychiatric: alert and oriented, affect appropriate    Lines: PIV                              10.5   4.3   )-----------( 151      ( 05 Jul 2017 08:29 )             30.6       07-05    147<H>  |  109<H>  |  6<L>  ----------------------------<  97  3.5   |  26  |  0.88    Ca    8.4      05 Jul 2017 08:28    MICROBIOLOGY:  RECENT CULTURES:    Culture - Blood (07.01.17 @ 15:17)    Specimen Source: .Blood Blood-Peripheral    Culture Results:   No growth to date.    Culture - Blood (07.01.17 @ 15:17)    Specimen Source: .Blood Blood-Peripheral    Culture Results:   No growth to date.    Culture - Urine (07.01.17 @ 15:10)    Specimen Source: .Urine Catheterized    Culture Results:   50,000 - 99,000 CFU/mL Coag Negative Staphylococcus

## 2017-07-10 NOTE — DISCHARGE NOTE ADULT - MEDICATION SUMMARY - MEDICATIONS TO TAKE
I will START or STAY ON the medications listed below when I get home from the hospital:    acetaminophen 325 mg oral tablet  -- 2 tab(s) by mouth every 6 hours, As needed, For Temp greater than 38 C (100.4 F)  -- Indication: For fever    acetaminophen 325 mg oral tablet  -- 2 tab(s) by mouth every 6 hours, As needed, Moderate Pain (4 - 6)  -- Indication: For Pain    tamsulosin 0.4 mg oral capsule  -- 1 cap(s) by mouth once a day (at bedtime)  -- Indication: For BPH    enoxaparin  -- 40 milligram(s) subcutaneous once a day  -- Indication: For Ppx    divalproex sodium 250 mg oral delayed release tablet  -- 3 tab(s) by mouth 2 times a day  -- Indication: For Bipolar disorder    Zofran ODT 4 mg oral tablet, disintegrating  -- 1 tab(s) by mouth every 8 hours, As Needed - for nausea  -- Indication: For nausea/vomiting    simvastatin 20 mg oral tablet  -- 1 tab(s) by mouth once a day (at bedtime)  -- Indication: For Hyperlipidemia    benztropine 0.5 mg oral tablet  -- 1 tab(s) by mouth once a day  -- Indication: For Bipolar disorder    risperiDONE 0.5 mg oral tablet  -- 3 tab(s) by mouth 2 times a day  -- Indication: For Bipolar disorder    albuterol 90 mcg/inh inhalation aerosol  -- 1 puff(s) inhaled every 4 hours  -- Indication: For COPD    albuterol-ipratropium 2.5 mg-0.5 mg/3 mL inhalation solution  -- 3 milliliter(s) inhaled every 6 hours  -- Indication: For COPD    tiotropium 18 mcg inhalation capsule  -- 1 cap(s) inhaled once a day  -- Indication: For COPD    dicyclomine 20 mg oral tablet  -- 1 tab(s) by mouth 2 times a day (before meals)  -- Indication: For nausea    Metamucil 3.4 g/3.7 g oral powder for reconstitution  -- 30 milliliter(s) by mouth once a day mixed in water  -- Indication: For constipation    sodium biphosphate-sodium phosphate 7 g-19 g rectal enema  -- 1 each rectally once a day, As needed, constipation  -- Indication: For constipation    senna oral tablet  -- 2 tab(s) by mouth once a day (at bedtime), As needed, Constipation  -- Indication: For constipation    polyethylene glycol 3350 oral powder for reconstitution  -- 17 gram(s) by mouth once a day  -- Indication: For constipation    docusate sodium 100 mg oral capsule  -- 1 cap(s) by mouth 2 times a day  -- Indication: For constipation    sulfamethoxazole-trimethoprim 800 mg-160 mg oral tablet  -- 1 tab(s) by mouth 2 times a day  -- Indication: For Protatitis    omeprazole 40 mg oral delayed release capsule  -- 1 cap(s) by mouth 2 times a day  -- Indication: For GERD    pantoprazole 40 mg oral delayed release tablet  -- 1 tab(s) by mouth once a day (before a meal)  -- Indication: For GERD    levothyroxine 75 mcg (0.075 mg) oral tablet  -- 1 tab(s) by mouth once a day  -- Indication: For Hypothyroid    multivitamin  -- 1 tab(s) by mouth once a day  -- Indication: For Supplement    cholecalciferol 50,000 intl units oral capsule  -- 1 cap(s) by mouth once a week on Sunday  -- Indication: For Supplement    Vitamin B2 100 mg oral tablet  -- 1 tab(s) by mouth once a day  -- Indication: For Supplement

## 2017-07-10 NOTE — DISCHARGE NOTE ADULT - MEDICATION SUMMARY - MEDICATIONS TO STOP TAKING
I will STOP taking the medications listed below when I get home from the hospital:    Advil 200 mg oral tablet  -- 1 tab(s) by mouth every 8 hours, As Needed    meclizine 12.5 mg oral tablet  -- 1 tab(s) by mouth once a day    Antivert 12.5 mg oral tablet  -- 2 tab(s) by mouth once a day (at bedtime)    amoxicillin-clavulanate 875 mg-125 mg oral tablet  -- 1 tab(s) by mouth 2 times a day Last dose to be given on 6/11/17    sucralfate 1 g oral tablet  -- 1 tab(s) by mouth 4 times a day

## 2017-07-18 ENCOUNTER — APPOINTMENT (OUTPATIENT)
Dept: GASTROENTEROLOGY | Facility: HOSPITAL | Age: 57
End: 2017-07-18

## 2017-09-16 NOTE — H&P ADULT. - ADMIT DATE
25-May-2017 Problem: Patient Care Overview  Goal: Plan of Care Review  Outcome: Ongoing (interventions implemented as appropriate)  Plan of care reviewed with patient. Patient has no complaints at this time. Patient remains on dobutamine drip at ordered rate. Patient checked off on alarms and wife checked off on dressing change. Plan for patient to be discharged next week. Patient remains free of falls and injury.

## 2017-10-12 NOTE — DIETITIAN INITIAL EVALUATION ADULT. - +GENDER
Janey Torres  1984  [id number]    HISTORY OF PRESENT ILLNESS:  Ms. Pepe Rausch is a 29 y.o. female returns for a follow up visit for multiple medical problems. Her current presenting problems are   1. Chronic pain syndrome    2. Fibromyalgia    3. Pelvic pain syndrome    4. Endometriosis    5. Primary insomnia    6. Recurrent major depressive disorder, in partial remission ([facility] [address]    . As per information/history obtained from the PADT(patient assessment and documentation tool) - She complains of pain in the neck, abdomen and lower back with radiation to the shoulders Bilateral, arms Bilateral, hands Bilateral, buttocks, upper leg Right and knees Right She rates the pain 7/10 and describes it as sharp, aching. Pain is made worse by: bending, lifting. Current treatment regimen has helped relieve about 30% of the pain. She denies side effects from the current pain regimen. Patient reports that since the last follow up visit the physical functioning is unchanged, family/social relationships are worse, mood is unchanged and sleep patterns are worse, and that the overall functioning is unchanged. Patient denies neurological bowel or bladder. Patient denies misusing/abusing her narcotic pain medications or using any illegal drugs. There are No indicators for possible drug abuse, addiction or diversion problems. Upon obtaining the medical history from Ms. Pepe Rausch regarding today's office visit for her presenting problems, patient states her pain has been tolerable somewhat. She states she has been using OTC supplement, not sure if it is helping. Ms. Samm Soto mentions using Neurontin 4 times per day. She mentions her low back and abdomen hurts the most. She says she still does not have any insurance. Patient reports using Flexeril PRN 1/2-1 tablet nightly. Patient's  subjective report of her mood is fair. she describes occasional symptoms of depression, occasional  irritability and some mood swings. Ms. Liseth May, family and past medical history was reviewed. REVIEW OF SYSTEMS:    Respiratory: Negative for apnea, chest tightness and shortness of breath or change in baseline breathing. Gastrointestinal: Negative for nausea, vomiting, abdominal pain, diarrhea, constipation, blood in stool and abdominal distention. PHYSICAL EXAM:   Nursing note and vitals reviewed. /71 (Site: Right Arm, Position: Sitting)   Pulse 92   Wt 147 lb (66.7 kg)   LMP 10/05/2017 (Within Days)   Breastfeeding? Yes   BMI 25.23 kg/m²   Constitutional: She appears well-developed and well-nourished. No acute distress. Skin: Skin is warm and dry, good turgor. No rash noted. She is not diaphoretic. Cardiovascular: Normal rate, regular rhythm, normal heart sounds, and does not have murmur. Pulmonary/Chest: Effort normal. No respiratory distress. She does not have wheezes in the lung fields. She has no rales. Neurological/Psychiatric:She is alert and oriented to person, place, and time. Coordination is  normal. Her mood isAppropriate and affect is Flat/blunted . IMPRESSION:   1. Chronic pain syndrome    2. Fibromyalgia    3. Pelvic pain syndrome    4. Endometriosis    5. Primary insomnia    6. Recurrent major depressive disorder, in partial remission ([facility] [address]        PLAN:  Informed verbal consent was obtained  -Continue with current regimen   -Adv Biofeedback, relaxation and meditation techniques.  Referral to psychologist for CBT was also discussed with patient  -Advised to try decreasing Percocet to PRN, using 1/2 BID, will decrease to 1/4 BID   -Walking/stretching exercises as advised      Current Outpatient Prescriptions   Medication Sig Dispense Refill    Cranberry-Milk Thistle 250-75 MG CAPS Take by mouth      promethazine (PHENERGAN) 25 MG tablet TAKE 1 TABLET BY MOUTH EVERY 6 HOURS AS NEEDED FOR NAUSEA 45 tablet 0    oxyCODONE-acetaminophen (PERCOCET) 5-325 MG per tablet Take 1/2  tablet Q6H as needed for pain. Max 1 tablet per day. 42 tablet 0    sertraline (ZOLOFT) 50 MG tablet Take 1 tablet by mouth daily 30 tablet 1    cyclobenzaprine (FLEXERIL) 10 MG tablet Take 1/2 -1 tab po qhs 30 tablet 1    naproxen (NAPROSYN) 500 MG tablet Take 1 tablet by mouth 2 times daily (with meals) 60 tablet 1    gabapentin (NEURONTIN) 400 MG capsule Take one tab in the am, one tab in the afternoon and two tabs in the pm 120 capsule 1    Probiotic Product (PROBIOTIC DAILY PO) Take by mouth daily      lidocaine viscous-aluminum & magnesium hydroxide-simethicone-diphenhydrAMINE-nystatin Take 5 mLs by mouth every 4 hours 180 mL 0    calcium carbonate (OSCAL) 500 MG TABS tablet Take 500 mg by mouth daily.  calcium carbonate (TUMS) 500 MG chewable tablet Take 1 tablet by mouth daily.  Magnesium Oxide (MAG-200 PO) Take 1 tablet by mouth daily.  Multiple Vitamin (MULTI-VITAMIN DAILY PO) Take  by mouth.  Omega-3 Fatty Acids (FISH OIL) 1000 MG CAPS Take 1,000 mg by mouth daily. No current facility-administered medications for this visit. I will continue her current medication regimen  which is part of the above treatment schedule. It has been helping with Ms. Indira Mcneal chronic  medical problems which for this visit include:   Diagnoses of Chronic pain syndrome, Fibromyalgia, Pelvic pain syndrome, Endometriosis, Primary insomnia, and Recurrent major depressive disorder, in partial remission ([facility] [address] were pertinent to this visit. Risks and benefits of the medications and other alternative treatments  including no treatment were discussed with the patient. The common side effects of these medications were also explained to the patient. Informed verbal consent was obtained.    Goals of current treatment regimen include improvement in pain, restoration of functioning- with focus on improvement in physical performance, general activity, work or disability,emotional distress, health care utilization and  decreased medication consumption. Will continue to monitor progress towards achieving/maintaining therapeutic goals with special emphasis on  1. Improvement in perceived interfernce  of pain with ADL's. Ability to do home exercises independently. Ability to do household chores indoor and/or outdoor work and social and leisure activities. Improve psychosocial and physical functioning. - she is showing progression towards this treatment goal with the current regimen. She was advised against drinking alcohol with the narcotic pain medicines, advised against driving or handling machinery while adjusting the dose of medicines or if having cognitive  issues related to the current medications. Risk of overdose and death, if medicines not taken as prescribed, were also discussed. If the patient develops new symptoms or if the symptoms worsen, the patient should call the office. While transcribing every attempt was made to maintain the accuracy of the note in terms of it's contents,there may have been some errors made inadvertently. Thank you for allowing me to participate in the care of this patient. Lawyer Luis MD.    Cc: MD HERMAN Manuel Cheryl Clink, am scribing for and in the presence of Dr. Lawyer Smith.    10/12/17  12:54 PM  KELLY Tan Dr. Lawyer Even, personally performed the services described in this documentation as scribed by   Katherine Rodriguez MA in my presence and it is both accurate and complete Statement Selected
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
## 2018-01-02 ENCOUNTER — APPOINTMENT (OUTPATIENT)
Dept: UROLOGY | Facility: CLINIC | Age: 58
End: 2018-01-02

## 2018-07-17 ENCOUNTER — APPOINTMENT (OUTPATIENT)
Dept: GASTROENTEROLOGY | Facility: CLINIC | Age: 58
End: 2018-07-17
Payer: MEDICARE

## 2018-07-17 VITALS
DIASTOLIC BLOOD PRESSURE: 78 MMHG | BODY MASS INDEX: 29.12 KG/M2 | HEIGHT: 71 IN | WEIGHT: 208 LBS | SYSTOLIC BLOOD PRESSURE: 110 MMHG | OXYGEN SATURATION: 98 % | HEART RATE: 68 BPM

## 2018-07-17 DIAGNOSIS — R13.10 DYSPHAGIA, UNSPECIFIED: ICD-10-CM

## 2018-07-17 DIAGNOSIS — M81.0 AGE-RELATED OSTEOPOROSIS W/OUT CURRENT PATHOLOGICAL FRACTURE: ICD-10-CM

## 2018-07-17 DIAGNOSIS — R11.10 VOMITING, UNSPECIFIED: ICD-10-CM

## 2018-07-17 DIAGNOSIS — Z91.81 HISTORY OF FALLING: ICD-10-CM

## 2018-07-17 DIAGNOSIS — G91.2 (IDIOPATHIC) NORMAL PRESSURE HYDROCEPHALUS: ICD-10-CM

## 2018-07-17 DIAGNOSIS — E03.9 HYPOTHYROIDISM, UNSPECIFIED: ICD-10-CM

## 2018-07-17 DIAGNOSIS — K59.09 OTHER CONSTIPATION: ICD-10-CM

## 2018-07-17 DIAGNOSIS — Z12.11 ENCOUNTER FOR SCREENING FOR MALIGNANT NEOPLASM OF COLON: ICD-10-CM

## 2018-07-17 DIAGNOSIS — M62.81 MUSCLE WEAKNESS (GENERALIZED): ICD-10-CM

## 2018-07-17 DIAGNOSIS — G20 PARKINSON'S DISEASE: ICD-10-CM

## 2018-07-17 PROCEDURE — 99204 OFFICE O/P NEW MOD 45 MIN: CPT

## 2018-07-19 ENCOUNTER — APPOINTMENT (OUTPATIENT)
Dept: UROLOGY | Facility: CLINIC | Age: 58
End: 2018-07-19
Payer: MEDICARE

## 2018-07-19 ENCOUNTER — OUTPATIENT (OUTPATIENT)
Dept: OUTPATIENT SERVICES | Facility: HOSPITAL | Age: 58
LOS: 1 days | End: 2018-07-19
Payer: MEDICARE

## 2018-07-19 DIAGNOSIS — N20.1 CALCULUS OF URETER: ICD-10-CM

## 2018-07-19 DIAGNOSIS — E21.3 HYPERPARATHYROIDISM, UNSPECIFIED: ICD-10-CM

## 2018-07-19 PROCEDURE — 76775 US EXAM ABDO BACK WALL LIM: CPT

## 2018-07-19 PROCEDURE — 99214 OFFICE O/P EST MOD 30 MIN: CPT | Mod: 25

## 2018-07-19 PROCEDURE — 76775 US EXAM ABDO BACK WALL LIM: CPT | Mod: 26

## 2018-07-19 RX ORDER — DESMOPRESSIN ACETATE 0.1 MG/ML
0.01 SOLUTION NASAL
Qty: 5 | Refills: 11 | Status: DISCONTINUED | OUTPATIENT
Start: 2018-07-19 | End: 2018-07-19

## 2018-07-20 LAB
APPEARANCE: CLEAR
BACTERIA: NEGATIVE
BILIRUBIN URINE: NEGATIVE
BLOOD URINE: NEGATIVE
COLOR: YELLOW
GLUCOSE QUALITATIVE U: NEGATIVE MG/DL
KETONES URINE: NEGATIVE
LEUKOCYTE ESTERASE URINE: NEGATIVE
MICROSCOPIC-UA: NORMAL
NITRITE URINE: NEGATIVE
PH URINE: 6
PROTEIN URINE: NEGATIVE MG/DL
RED BLOOD CELLS URINE: 0 /HPF
SPECIFIC GRAVITY URINE: 1.02
SQUAMOUS EPITHELIAL CELLS: 0 /HPF
UROBILINOGEN URINE: NEGATIVE MG/DL
WHITE BLOOD CELLS URINE: 0 /HPF

## 2018-07-23 LAB — BACTERIA UR CULT: NORMAL

## 2018-07-26 DIAGNOSIS — N20.1 CALCULUS OF URETER: ICD-10-CM

## 2018-07-26 DIAGNOSIS — N39.44 NOCTURNAL ENURESIS: ICD-10-CM

## 2018-07-26 DIAGNOSIS — E21.3 HYPERPARATHYROIDISM, UNSPECIFIED: ICD-10-CM

## 2018-08-16 ENCOUNTER — CHART COPY (OUTPATIENT)
Age: 58
End: 2018-08-16

## 2018-08-28 ENCOUNTER — OUTPATIENT (OUTPATIENT)
Dept: OUTPATIENT SERVICES | Facility: HOSPITAL | Age: 58
LOS: 1 days | End: 2018-08-28
Payer: MEDICARE

## 2018-08-28 ENCOUNTER — APPOINTMENT (OUTPATIENT)
Dept: NEUROLOGY | Facility: HOSPITAL | Age: 58
End: 2018-08-28

## 2018-08-28 VITALS
HEART RATE: 63 BPM | SYSTOLIC BLOOD PRESSURE: 125 MMHG | BODY MASS INDEX: 29.12 KG/M2 | HEIGHT: 71 IN | RESPIRATION RATE: 14 BRPM | WEIGHT: 208 LBS | DIASTOLIC BLOOD PRESSURE: 85 MMHG

## 2018-08-28 DIAGNOSIS — R51 HEADACHE: ICD-10-CM

## 2018-08-28 DIAGNOSIS — R26.9 UNSPECIFIED ABNORMALITIES OF GAIT AND MOBILITY: ICD-10-CM

## 2018-08-28 DIAGNOSIS — G20 PARKINSON'S DISEASE: ICD-10-CM

## 2018-08-28 PROCEDURE — G0463: CPT

## 2018-08-28 RX ORDER — DIVALPROEX SODIUM 250 MG/1
250 TABLET, DELAYED RELEASE ORAL
Refills: 0 | Status: ACTIVE | COMMUNITY
Start: 2018-08-28

## 2018-08-28 RX ORDER — SIMVASTATIN 20 MG/1
20 TABLET, FILM COATED ORAL
Refills: 0 | Status: ACTIVE | COMMUNITY

## 2018-08-28 RX ORDER — RISPERIDONE 0.5 MG/1
0.5 TABLET, FILM COATED ORAL TWICE DAILY
Refills: 0 | Status: ACTIVE | COMMUNITY
Start: 2018-08-28

## 2018-08-28 RX ORDER — RISPERIDONE 1 MG/1
1 TABLET ORAL TWICE DAILY
Refills: 0 | Status: ACTIVE | COMMUNITY

## 2018-08-28 RX ORDER — LEVOTHYROXINE SODIUM 0.1 MG/1
100 TABLET ORAL
Refills: 0 | Status: DISCONTINUED | COMMUNITY
End: 2018-08-28

## 2018-08-28 RX ORDER — TAMSULOSIN HCL 0.4 MG
CAPSULE ORAL
Refills: 0 | Status: DISCONTINUED | COMMUNITY
End: 2018-08-28
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
## 2018-10-08 ENCOUNTER — APPOINTMENT (OUTPATIENT)
Dept: GASTROENTEROLOGY | Facility: HOSPITAL | Age: 58
End: 2018-10-08

## 2018-10-08 ENCOUNTER — OUTPATIENT (OUTPATIENT)
Dept: OUTPATIENT SERVICES | Facility: HOSPITAL | Age: 58
LOS: 1 days | End: 2018-10-08
Payer: MEDICARE

## 2018-10-08 ENCOUNTER — RESULT REVIEW (OUTPATIENT)
Age: 58
End: 2018-10-08

## 2018-10-08 DIAGNOSIS — Z12.11 ENCOUNTER FOR SCREENING FOR MALIGNANT NEOPLASM OF COLON: ICD-10-CM

## 2018-10-08 DIAGNOSIS — R13.10 DYSPHAGIA, UNSPECIFIED: ICD-10-CM

## 2018-10-08 DIAGNOSIS — R11.10 VOMITING, UNSPECIFIED: ICD-10-CM

## 2018-10-08 PROCEDURE — 88305 TISSUE EXAM BY PATHOLOGIST: CPT

## 2018-10-08 PROCEDURE — 45380 COLONOSCOPY AND BIOPSY: CPT | Mod: PT

## 2018-10-08 PROCEDURE — 43239 EGD BIOPSY SINGLE/MULTIPLE: CPT

## 2018-10-08 PROCEDURE — 45380 COLONOSCOPY AND BIOPSY: CPT

## 2018-10-08 PROCEDURE — 88305 TISSUE EXAM BY PATHOLOGIST: CPT | Mod: 26

## 2018-10-10 LAB — SURGICAL PATHOLOGY STUDY: SIGNIFICANT CHANGE UP

## 2018-12-06 ENCOUNTER — OUTPATIENT (OUTPATIENT)
Dept: OUTPATIENT SERVICES | Facility: HOSPITAL | Age: 58
LOS: 1 days | End: 2018-12-06
Payer: MEDICAID

## 2018-12-06 ENCOUNTER — APPOINTMENT (OUTPATIENT)
Dept: NEUROLOGY | Facility: HOSPITAL | Age: 58
End: 2018-12-06

## 2018-12-06 VITALS
WEIGHT: 205 LBS | DIASTOLIC BLOOD PRESSURE: 85 MMHG | SYSTOLIC BLOOD PRESSURE: 126 MMHG | BODY MASS INDEX: 28.7 KG/M2 | HEIGHT: 71 IN | HEART RATE: 71 BPM

## 2018-12-06 DIAGNOSIS — G20 PARKINSON'S DISEASE: ICD-10-CM

## 2018-12-06 DIAGNOSIS — R51 HEADACHE: ICD-10-CM

## 2018-12-06 PROCEDURE — G0463: CPT

## 2018-12-06 RX ORDER — POLYETHYLENE GLYCOL 3350, SODIUM SULFATE ANHYDROUS, SODIUM BICARBONATE, SODIUM CHLORIDE, POTASSIUM CHLORIDE 227.1; 21.5; 6.36; 5.53; .754 G/L; G/L; G/L; G/L; G/L
227.1 POWDER, FOR SOLUTION ORAL
Qty: 1 | Refills: 0 | Status: DISCONTINUED | COMMUNITY
Start: 2018-07-17 | End: 2018-12-06

## 2018-12-06 RX ORDER — LEVOTHYROXINE SODIUM 0.07 MG/1
75 TABLET ORAL DAILY
Refills: 0 | Status: ACTIVE | COMMUNITY
Start: 2018-12-06

## 2019-02-28 ENCOUNTER — APPOINTMENT (OUTPATIENT)
Dept: NEUROLOGY | Facility: HOSPITAL | Age: 59
End: 2019-02-28

## 2019-08-01 ENCOUNTER — APPOINTMENT (OUTPATIENT)
Dept: NEUROLOGY | Facility: HOSPITAL | Age: 59
End: 2019-08-01

## 2019-08-01 ENCOUNTER — OUTPATIENT (OUTPATIENT)
Dept: OUTPATIENT SERVICES | Facility: HOSPITAL | Age: 59
LOS: 1 days | End: 2019-08-01
Payer: SELF-PAY

## 2019-08-01 VITALS
SYSTOLIC BLOOD PRESSURE: 108 MMHG | BODY MASS INDEX: 28.7 KG/M2 | HEART RATE: 54 BPM | HEIGHT: 71 IN | DIASTOLIC BLOOD PRESSURE: 76 MMHG | WEIGHT: 205 LBS

## 2019-08-01 DIAGNOSIS — R26.9 UNSPECIFIED ABNORMALITIES OF GAIT AND MOBILITY: ICD-10-CM

## 2019-08-01 DIAGNOSIS — R56.9 UNSPECIFIED CONVULSIONS: ICD-10-CM

## 2019-08-01 PROCEDURE — G0463: CPT

## 2019-08-01 NOTE — HISTORY OF PRESENT ILLNESS
[FreeTextEntry1] : Patient is a 58M with a history of bipolar disorder, medication induced Parkinsonism, hyperthyroidism, and NPH who presents to the neurology clinic for a follow-up visit for gait instability He lives at [facility] for the past two years and is accompanied by a nurse form his facility.\par \par Patient states his difficulty with ambulation is worsening. He is still able to walk with a walker, however, now feels off balance and has had several near falls. Patient also endorsing urinary incontinence at night, though he states he's been having this off and on for 10 years, but more frequent now. Denies any cognitive issues, though he was noted to have some memory issues during his previous visit. Patient previously stopped PT due to insurance issues, but is not back on PT 2/day 5d/weeks. \par \par Patient has been having these gait issues for the past 10 years. Was previously attributed to NPH, however patient did not improve with spinal tap x2. Last MRI in 2016 demonstrating mild ventriculomegaly. \par \par Medications: \par Risperidone 1.5 mg BID\par Depakote 500mg BID, 250 mg TID \par Benztropine 0.5mg

## 2019-08-01 NOTE — PHYSICAL EXAM
[FreeTextEntry1] : Gen: Masked face\par MS: R. Facial droop. Otherwise, CNII-XII intact\par Motor: 5/5 Bilaterally throughout \par Reflexes: 3+ RUE/RLE, 2+ Left. 2+ throughout\par Sensory: intact to LT throughout\par Coordination: No dysmetria FNF\par Gait: Was unable to ambulate

## 2019-08-01 NOTE — END OF VISIT
[] : Resident [FreeTextEntry3] : Suggest to repeat Brain MRI and neurosurgical reevaluation for NPH?\par Patient will follow up with private neurologist and neurosurgeon given insurance changes \par

## 2019-08-01 NOTE — DISCUSSION/SUMMARY
[FreeTextEntry1] : Patient is a 58M with a history of bipolar disorder, medication induced Parkinsonism, hyperthyroidism, and NPH who presents to the neurology clinc for follow up for gait instablity that is worsening, urinary incontinence. Patient noted to have cogwheel rigidity, othwerwise normal exam. Patient failed spinal tap x2 for NPH, with no improvement of his gait afterward. Last MRI 2016 demonstrating ventriculomegaly. \par \par \par Plan: \par -We are unable to provide any referrals or medications given patients private insurance. Patient was given phone number for [address] [place] [address] to follow up with Private Neurologist there. Patient has not had an MRI since 2016 and thus should get a repeat MRI and repeat Neurosurgical evaluation. He should discuss these plans w/ private neurologist.

## 2019-09-12 ENCOUNTER — APPOINTMENT (OUTPATIENT)
Dept: NEUROLOGY | Facility: HOSPITAL | Age: 59
End: 2019-09-12

## 2019-09-15 ENCOUNTER — INPATIENT (INPATIENT)
Facility: HOSPITAL | Age: 59
LOS: 3 days | Discharge: NURSING, REHAB OR LONG-TERM CARE | DRG: 103 | End: 2019-09-19
Attending: HOSPITALIST | Admitting: HOSPITALIST
Payer: MEDICARE

## 2019-09-15 VITALS
SYSTOLIC BLOOD PRESSURE: 149 MMHG | OXYGEN SATURATION: 98 % | WEIGHT: 199.96 LBS | HEART RATE: 90 BPM | DIASTOLIC BLOOD PRESSURE: 85 MMHG | RESPIRATION RATE: 20 BRPM | TEMPERATURE: 98 F

## 2019-09-15 LAB
ALBUMIN SERPL ELPH-MCNC: 4.3 G/DL — SIGNIFICANT CHANGE UP (ref 3.3–5)
ALP SERPL-CCNC: 47 U/L — SIGNIFICANT CHANGE UP (ref 40–120)
ALT FLD-CCNC: 13 U/L — SIGNIFICANT CHANGE UP (ref 10–45)
ANION GAP SERPL CALC-SCNC: 15 MMOL/L — SIGNIFICANT CHANGE UP (ref 5–17)
APPEARANCE UR: ABNORMAL
AST SERPL-CCNC: 16 U/L — SIGNIFICANT CHANGE UP (ref 10–40)
BACTERIA # UR AUTO: NEGATIVE — SIGNIFICANT CHANGE UP
BASOPHILS # BLD AUTO: 0 K/UL — SIGNIFICANT CHANGE UP (ref 0–0.2)
BASOPHILS NFR BLD AUTO: 0.4 % — SIGNIFICANT CHANGE UP (ref 0–2)
BILIRUB SERPL-MCNC: 0.3 MG/DL — SIGNIFICANT CHANGE UP (ref 0.2–1.2)
BILIRUB UR-MCNC: NEGATIVE — SIGNIFICANT CHANGE UP
BUN SERPL-MCNC: 19 MG/DL — SIGNIFICANT CHANGE UP (ref 7–23)
CALCIUM SERPL-MCNC: 9.4 MG/DL — SIGNIFICANT CHANGE UP (ref 8.4–10.5)
CHLORIDE SERPL-SCNC: 101 MMOL/L — SIGNIFICANT CHANGE UP (ref 96–108)
CO2 SERPL-SCNC: 25 MMOL/L — SIGNIFICANT CHANGE UP (ref 22–31)
COLOR SPEC: YELLOW — SIGNIFICANT CHANGE UP
CREAT SERPL-MCNC: 1.02 MG/DL — SIGNIFICANT CHANGE UP (ref 0.5–1.3)
DIFF PNL FLD: NEGATIVE — SIGNIFICANT CHANGE UP
EOSINOPHIL # BLD AUTO: 0.1 K/UL — SIGNIFICANT CHANGE UP (ref 0–0.5)
EOSINOPHIL NFR BLD AUTO: 1.1 % — SIGNIFICANT CHANGE UP (ref 0–6)
EPI CELLS # UR: 1 /HPF — SIGNIFICANT CHANGE UP
GLUCOSE SERPL-MCNC: 105 MG/DL — HIGH (ref 70–99)
GLUCOSE UR QL: NEGATIVE — SIGNIFICANT CHANGE UP
HCT VFR BLD CALC: 40.1 % — SIGNIFICANT CHANGE UP (ref 39–50)
HGB BLD-MCNC: 13.6 G/DL — SIGNIFICANT CHANGE UP (ref 13–17)
HYALINE CASTS # UR AUTO: 1 /LPF — SIGNIFICANT CHANGE UP (ref 0–2)
KETONES UR-MCNC: ABNORMAL
LEUKOCYTE ESTERASE UR-ACNC: NEGATIVE — SIGNIFICANT CHANGE UP
LYMPHOCYTES # BLD AUTO: 2.1 K/UL — SIGNIFICANT CHANGE UP (ref 1–3.3)
LYMPHOCYTES # BLD AUTO: 24.3 % — SIGNIFICANT CHANGE UP (ref 13–44)
MCHC RBC-ENTMCNC: 31.4 PG — SIGNIFICANT CHANGE UP (ref 27–34)
MCHC RBC-ENTMCNC: 33.9 GM/DL — SIGNIFICANT CHANGE UP (ref 32–36)
MCV RBC AUTO: 92.7 FL — SIGNIFICANT CHANGE UP (ref 80–100)
MONOCYTES # BLD AUTO: 1 K/UL — HIGH (ref 0–0.9)
MONOCYTES NFR BLD AUTO: 11.2 % — SIGNIFICANT CHANGE UP (ref 2–14)
NEUTROPHILS # BLD AUTO: 5.5 K/UL — SIGNIFICANT CHANGE UP (ref 1.8–7.4)
NEUTROPHILS NFR BLD AUTO: 63 % — SIGNIFICANT CHANGE UP (ref 43–77)
NITRITE UR-MCNC: NEGATIVE — SIGNIFICANT CHANGE UP
PH UR: 6.5 — SIGNIFICANT CHANGE UP (ref 5–8)
PLATELET # BLD AUTO: 160 K/UL — SIGNIFICANT CHANGE UP (ref 150–400)
POTASSIUM SERPL-MCNC: 4.1 MMOL/L — SIGNIFICANT CHANGE UP (ref 3.5–5.3)
POTASSIUM SERPL-SCNC: 4.1 MMOL/L — SIGNIFICANT CHANGE UP (ref 3.5–5.3)
PROT SERPL-MCNC: 7.4 G/DL — SIGNIFICANT CHANGE UP (ref 6–8.3)
PROT UR-MCNC: ABNORMAL
RBC # BLD: 4.33 M/UL — SIGNIFICANT CHANGE UP (ref 4.2–5.8)
RBC # FLD: 12.3 % — SIGNIFICANT CHANGE UP (ref 10.3–14.5)
RBC CASTS # UR COMP ASSIST: 8 /HPF — HIGH (ref 0–4)
SODIUM SERPL-SCNC: 141 MMOL/L — SIGNIFICANT CHANGE UP (ref 135–145)
SP GR SPEC: 1.03 — HIGH (ref 1.01–1.02)
UROBILINOGEN FLD QL: ABNORMAL
WBC # BLD: 8.8 K/UL — SIGNIFICANT CHANGE UP (ref 3.8–10.5)
WBC # FLD AUTO: 8.8 K/UL — SIGNIFICANT CHANGE UP (ref 3.8–10.5)
WBC UR QL: 4 /HPF — SIGNIFICANT CHANGE UP (ref 0–5)

## 2019-09-15 PROCEDURE — 71045 X-RAY EXAM CHEST 1 VIEW: CPT | Mod: 26

## 2019-09-15 PROCEDURE — 99285 EMERGENCY DEPT VISIT HI MDM: CPT

## 2019-09-15 PROCEDURE — 70450 CT HEAD/BRAIN W/O DYE: CPT | Mod: 26

## 2019-09-15 PROCEDURE — 93010 ELECTROCARDIOGRAM REPORT: CPT

## 2019-09-15 RX ORDER — KETOROLAC TROMETHAMINE 30 MG/ML
15 SYRINGE (ML) INJECTION ONCE
Refills: 0 | Status: DISCONTINUED | OUTPATIENT
Start: 2019-09-15 | End: 2019-09-15

## 2019-09-15 RX ORDER — ACETAMINOPHEN 500 MG
975 TABLET ORAL ONCE
Refills: 0 | Status: COMPLETED | OUTPATIENT
Start: 2019-09-15 | End: 2019-09-15

## 2019-09-15 RX ORDER — METOCLOPRAMIDE HCL 10 MG
10 TABLET ORAL ONCE
Refills: 0 | Status: COMPLETED | OUTPATIENT
Start: 2019-09-15 | End: 2019-09-15

## 2019-09-15 RX ORDER — SODIUM CHLORIDE 9 MG/ML
1000 INJECTION INTRAMUSCULAR; INTRAVENOUS; SUBCUTANEOUS ONCE
Refills: 0 | Status: COMPLETED | OUTPATIENT
Start: 2019-09-15 | End: 2019-09-15

## 2019-09-15 RX ADMIN — Medication 10 MILLIGRAM(S): at 17:54

## 2019-09-15 RX ADMIN — SODIUM CHLORIDE 1000 MILLILITER(S): 9 INJECTION INTRAMUSCULAR; INTRAVENOUS; SUBCUTANEOUS at 17:54

## 2019-09-15 RX ADMIN — Medication 975 MILLIGRAM(S): at 17:54

## 2019-09-15 RX ADMIN — Medication 15 MILLIGRAM(S): at 19:00

## 2019-09-15 NOTE — CONSULT NOTE ADULT - SUBJECTIVE AND OBJECTIVE BOX
HPI: 59 year old RH M with a PMH of NPH (s/p drainage twice), hypothyroidism, and bipolar disorder presents with headaches for the past week. Patient states that since May 2019 he has become weaker. He was previously able to ambulate with a walker (for the past 5-6 years), but has been falling and requiring a wheelchair. He notes that his left lower extremity is weaker than his right and he endorses left hip pain. He notes the weakness particularly attempting to move from a sitting to a standing position (he falls back into the chair). He describes the headache as a 9/10, throbbing, bifrontal, periorbital, and occipital in location. He sometimes gets headaches, which are alleviated by tylenol but states that nothing has been helping his current headache. He endorses phonophobia and nausea. The nausea started two days ago. He has been having urinary incontinence chronically. He has not seen a neurologist since last year due to insurance issues. He also notes that since  or  he has been having a "puffy" left eye, blurry vision, left eye pain, and increased tearing bilaterally. He states that he wears bifocals with prism. He denies changes in hearing, vomiting, photophobia, change in speech, or dysphagia.       REVIEW OF SYSTEMS    A 10-system ROS was performed and is negative except for those items noted above and/or in the HPI.    PAST MEDICAL & SURGICAL HISTORY:  MR (mental retardation)  Hypothyroidism  Hydronephrosis with Renal and Ureteral Calculous Obstruction: at last admission in [facility] ([place])   Nephrolithiasis  Bipolar Disorder  S/P Appendectomy:     FAMILY HISTORY:  Family history of hyperparathyroidism (Sibling)    SOCIAL HISTORY:   T/E/D: denies    MEDICATIONS (HOME):  Home Medications:  acetaminophen 325 mg oral tablet: 2 tab(s) orally every 6 hours, As needed, For Temp greater than 38 C (100.4 F) (10 Jul 2017 12:41)  acetaminophen 325 mg oral tablet: 2 tab(s) orally every 6 hours, As needed, Moderate Pain (4 - 6) (10 Jul 2017 12:41)  albuterol 90 mcg/inh inhalation aerosol: 1 puff(s) inhaled every 4 hours (10 Jul 2017 12:41)  albuterol-ipratropium 2.5 mg-0.5 mg/3 mL inhalation solution: 3 milliliter(s) inhaled every 6 hours (10 Jul 2017 12:41)  benztropine 0.5 mg oral tablet: 1 tab(s) orally once a day (10 Jul 2017 12:41)  cholecalciferol 50,000 intl units oral capsule: 1 cap(s) orally once a week on  (25 May 2017 09:12)  dicyclomine 20 mg oral tablet: 1 tab(s) orally 2 times a day (before meals) (10 Jul 2017 12:41)  divalproex sodium 250 mg oral delayed release tablet: 3 tab(s) orally 2 times a day (10 Jul 2017 12:41)  docusate sodium 100 mg oral capsule: 1 cap(s) orally 2 times a day (10 Jul 2017 12:41)  enoxaparin: 40 milligram(s) subcutaneous once a day (10 Jul 2017 12:41)  levothyroxine 75 mcg (0.075 mg) oral tablet: 1 tab(s) orally once a day (10 Jul 2017 12:41)  Metamucil 3.4 g/3.7 g oral powder for reconstitution: 30 milliliter(s) orally once a day mixed in water (25 May 2017 09:12)  multivitamin: 1 tab(s) orally once a day (25 May 2017 09:12)  omeprazole 40 mg oral delayed release capsule: 1 cap(s) orally 2 times a day (25 May 2017 09:12)  pantoprazole 40 mg oral delayed release tablet: 1 tab(s) orally once a day (before a meal) (10 Jul 2017 12:41)  polyethylene glycol 3350 oral powder for reconstitution: 17 gram(s) orally once a day (10 Jul 2017 12:41)  risperiDONE 0.5 mg oral tablet: 3 tab(s) orally 2 times a day (10 Jul 2017 12:41)  senna oral tablet: 2 tab(s) orally once a day (at bedtime), As needed, Constipation (10 Jul 2017 12:41)  simvastatin 20 mg oral tablet: 1 tab(s) orally once a day (at bedtime) (10 Jul 2017 12:41)  sodium biphosphate-sodium phosphate 7 g-19 g rectal enema: 1 each rectal once a day, As needed, constipation (10 Jul 2017 12:41)  sulfamethoxazole-trimethoprim 800 mg-160 mg oral tablet: 1 tab(s) orally 2 times a day (10 Jul 2017 12:41)  tamsulosin 0.4 mg oral capsule: 1 cap(s) orally once a day (at bedtime) (10 Jul 2017 12:41)  tiotropium 18 mcg inhalation capsule: 1 cap(s) inhaled once a day (10 Jul 2017 12:41)  Vitamin B2 100 mg oral tablet: 1 tab(s) orally once a day (25 May 2017 09:12)    MEDICATIONS  (STANDING):    MEDICATIONS  (PRN):    ALLERGIES/INTOLERANCES:  Allergies  No Known Allergies    Intolerances    VITALS & EXAMINATION:  Vital Signs Last 24 Hrs  T(C): 37.4 (15 Sep 2019 17:59), Max: 37.4 (15 Sep 2019 17:59)  T(F): 99.3 (15 Sep 2019 17:59), Max: 99.3 (15 Sep 2019 17:59)  HR: 93 (15 Sep 2019 17:59) (90 - 93)  BP: 131/84 (15 Sep 2019 17:59) (131/84 - 149/85)  BP(mean): --  RR: 18 (15 Sep 2019 17:59) (18 - 20)  SpO2: 98% (15 Sep 2019 17:59) (98% - 98%)    General: Obese Male, appears stated age, in no apparent distress    Neurological (>12):  MS: Awake, alert, oriented to person, place, situation. Knew the year, but not the month. Follows all commands.    Language: Speech is mildly dysarthric (at baseline as per patient/his sister), fluent with good repetition & comprehension    CNs: PERRL (R = 3mm, L = 3mm), unable to clearly check for APD as patient had difficulty keeping his eyes open. VF by BTT absent in left superior temporal quadrant. Visual acuity (BCVA) 20/70-2 OU. EOMI, pain with eye movements, diplopia in primary gaze and downgaze (tested with glasses), diplopia appears to be monocular OS, ptosis OS, no nystagmus. V1-3 intact to LT. Mild facial asymmetry, full eye closure strength b/l. Hearing grossly normal (rubbing fingers) b/l. Symmetric palate elevation in midline. Gag reflex deferred. Head turning & shoulder shrug intact b/l. Tongue midline, normal movements, no atrophy.    Motor: Normal muscle bulk & tone. No pronator drift.              Deltoid	Biceps	Triceps	Wrist		             R	5	5	5	5			5 	  L	5	5	5	5			5    	H-Flex	H-Ext			K-Flex	K-Ext	D-Flex	P-Flex  R	3	3			4-	4	5	5 	   L	3	3			4-	4-	4-	4-	     Sensation: Decreased to LT in LLE.     Cortical: Extinction on DSS (neglect): none    Reflexes:              Biceps(C5)       BR(C6)     Triceps(C7)               Patellar(L4)    Achilles(S1)    Plantar Resp  R	2	          2	             2		        2		    2		Down   L	2	          2	             2		        3		    2		Mute    Coordination: No dysmetria to FTN. Action tremor of LUE with FTN.    Gait: Patient could not cooperate.     LABORATORY:  CBC                       13.6   8.8   )-----------( 160      ( 15 Sep 2019 17:57 )             40.1     Chem 09-15    141  |  101  |  19  ----------------------------<  105<H>  4.1   |  25  |  1.02    Ca    9.4      15 Sep 2019 17:57    TPro  7.4  /  Alb  4.3  /  TBili  0.3  /  DBili  x   /  AST  16  /  ALT  13  /  AlkPhos  47  09-15    LFTs LIVER FUNCTIONS - ( 15 Sep 2019 17:57 )  Alb: 4.3 g/dL / Pro: 7.4 g/dL / ALK PHOS: 47 U/L / ALT: 13 U/L / AST: 16 U/L / GGT: x           Coagulopathy   Lipid Panel   A1c   Cardiac enzymes     U/A Urinalysis Basic - ( 15 Sep 2019 17:57 )    Color: Yellow / Appearance: Slightly Turbid / S.032 / pH: x  Gluc: x / Ketone: Small  / Bili: Negative / Urobili: 2 mg/dL   Blood: x / Protein: 30 mg/dL / Nitrite: Negative   Leuk Esterase: Negative / RBC: 8 /hpf / WBC 4 /HPF   Sq Epi: x / Non Sq Epi: 1 /hpf / Bacteria: Negative      CSF  Immunological  Other    STUDIES & IMAGING:  Studies (EKG, EEG, EMG, etc):     Radiology (XR, CT, MR, U/S, TTE/BEVERLEY):  < from: CT Head No Cont (09.15.19 @ 18:37) >  FINDINGS:     There is no acute intracranial mass-effect, hemorrhage, midline shift, or   abnormal extra-axial fluid collection.    The gray-white matter junction is grossly preserved. Mild patchy   hypodensities in the periventricular and subcortical white matter   compatible with microvascular ischemic changes.    The ventricular size and configuration is unchanged as compared to   2016. Basal cisterns are patent.     Mild mucosal thickening of the left maxillary sinus and near complete   opacification of the bilateral ethmoid air cells. The remaining paranasal   sinuses and mastoid air cells are clear. Calvarium is intact.   Hyperostosis frontalis interna.    IMPRESSION:     No acute intracranial bleeding, mass effect, or shift. Stable ventricular   size and configuration.    If symptoms persist, correlate with neurologic evaluation to determine if   further evaluation with MRI is warranted, if there are no   contraindications.    < end of copied text >

## 2019-09-15 NOTE — ED PROVIDER NOTE - PROGRESS NOTE DETAILS
Remedios Tyler MD PGY-2 per neuro, patient complaining of diplopia in L eye. No diplopia appreciated on my exam. Optho consult was recommended. optho was not concerned for orbital cellulitis. Neuro rec MRI brain, and LP eventually. Given ataxia, will admit to medicine for further eval Remedios Tyler MD PGY-2 will give additional reglan for HA and nausea Attending note (Angel): patient has remained stable in ED; per d/w neuro, to admit to medicine for further evaluation, possible therapeutic LP.

## 2019-09-15 NOTE — ED ADULT NURSE REASSESSMENT NOTE - NS ED NURSE REASSESS COMMENT FT1
Pt returned from xray. Opthalmology at bedside for pt evaluation. Will place mora cath, obtain rectal temp per MD request when optho is done seeing pt.

## 2019-09-15 NOTE — ED PROVIDER NOTE - PMH
Bipolar Disorder    Hydronephrosis with Renal and Ureteral Calculous Obstruction  at last admission in [facility] ([place]) 2010  Hypothyroidism    MR (mental retardation)    Nephrolithiasis

## 2019-09-15 NOTE — ED ADULT NURSE NOTE - OBJECTIVE STATEMENT
Pt c/o headache x "couple of weeks."  Headache is constant, frontal and occipital, Tylenol not helping, last took yesterday, hx of similar headaches.  Pt is conversive, abd soft/nd/nt, skin wdi, denies cp, fevers, falls, head trauma, abd pain, nvd, urinary symptoms.  Pt wheelchair bound. Pt c/o headache x "couple of weeks."  Headache is constant, frontal and occipital, Tylenol not helping, last took yesterday, hx of similar headaches.  Pt is conversive, reports blurry vision since thursday, abd soft/nd/nt, skin wdi, denies cp, fevers, falls, head trauma, abd pain, nvd, urinary symptoms.  Pt wheelchair bound with hx MR, bipolar, parkinsons, hydrocephalus.

## 2019-09-15 NOTE — CONSULT NOTE ADULT - ATTENDING COMMENTS
I have personally seen, examined, and participated in the care of this patient on rounds 9/16/19 with the neurology team.  I have reviewed all pertinent clinical information, including history, physical examination, assessment and plan.   Hx as noted above.  In addition, Pt reported that starting around 2010 he was "in and out of rehab" until 2016 since which time he has permanently resided in "rehab."  He responds he has no pain at this time.  C-spine MRI 2008: disc dessication and degenerative changes at C6-C7.    For exam findings, see above; in addition/in particular/instead I note:     Morbid obesity.  Mild/moderate psychomotor retardation.   Some difficulty following instructions.   Full-range smooth horizontal and vertical visual pursuit.  Nasal voice (he says lifelong).   We did not replicate the visual field disturbances.    As best could be ascertained, his best motor effort was worse in the LUE and LLE than on the R side.  Distinguishes pin and touch reliably on L side; essentially random responses on R side.     Unstable seated at side of [place] (falls slowly backward).  It was not possible w two-person assist to get him to stand up from sitting (went backward).    Normal neck and (once relaxation achieved) appendicular tonus.  No tremor.    Reflex                           Right    Left   Comment    Scapulohumeral               0        0  Biceps                            2        2  Triceps                           2        2  Faye                      absent  absent  Patellar                           2-      2+  Gastroc                          2        2  Plantar                        flexor    mute      DISCUSSION and RECOMMENDATION    With normal axial and appendicular tonus, and normal visual pursuit, he does not have stigmata of a "Parkinson plus" syndrome (such as PSP).  He has marked postural instability but none of the other cardinal features of idiopathic Parkinson's disease.       By my interpretation the degree of ventriculomegaly is commensurate with the amount of cerebral volume loss; I am not convinced that he has NPH    In any case, headache is not a feature of NPH.      I note the Hx of mental retardation.  Given mental retardation and supposedly life-long nasal speech, I wonder if the L sided weakness is due to (mild) cerebral palsy.      Request follow-up after brain/c-spine MRI.

## 2019-09-15 NOTE — ED ADULT NURSE REASSESSMENT NOTE - NS ED NURSE REASSESS COMMENT FT1
Pt states he feels he is unable to urinate, has not urinated in several hours. MD Leon aware, requesting mora placement for retention. Pt transported to xray exam, will place mora when pt returns.

## 2019-09-15 NOTE — CONSULT NOTE ADULT - ASSESSMENT
Assessment: 59 year old RH M with a PMH of NPH (s/p drainage twice), hypothyroidism, and bipolar disorder presents with headaches for the past week. Physical exam significant for b/l LE weakness (L>R), left eye ptosis/painful eye movements, and diplopia.     Impression: Increased difficulty with gait, worsening NPH appears less likely. Headache differential include cluster, migraine, orbital cellulitis      Plan:  Imaging:  [] CTH w/o- shows no acute changes.   [] MRI brain with contrast    Labs:  [] ESR/CRP    Medications:  [] Toradol 30 mg q6h IV  [] Benadryl 25 mg q6h IV     Other:  [] Ophthalmology evaluation: monocular diplopia/ptosis/painful eye movements.  [] PT/OT evaluation (must be done before LP, if LP is performed)  [] Consider LP (ventricles appear enlarged/stable)    Case discussed with neurology attending,

## 2019-09-15 NOTE — CONSULT NOTE ADULT - SUBJECTIVE AND OBJECTIVE BOX
[facility] Ophthalmology Consult Note    HPI:  59 year old with a PMH of NPH (s/p drainage twice), hypothyroidism, and bipolar disorder presents with headaches for the past week. Patient states that since May 2019 he has become weaker. He was previously able to ambulate with a walker (for the past 5-6 years), but has been falling and requiring a wheelchair. He notes that his left lower extremity is weaker than his right and he endorses left hip pain. He notes the weakness particularly attempting to move from a sitting to a standing position (he falls back into the chair). He describes the headache as a 9/10, throbbing, bifrontal, periorbital, and occipital in location. He sometimes gets headaches, which are alleviated by tylenol but states that nothing has been helping his current headache. He endorses phonophobia and nausea. The nausea started two days ago. He has been having urinary incontinence chronically. He has not seen a neurologist since last year due to insurance issues. He also notes that since 9/11 or 9/12 he has been having a "puffy" left eye, blurry vision, left eye pain, and increased tearing bilaterally. He states that he wears bifocals with prism. He denies changes in hearing, vomiting, photophobia, change in speech, or dysphagia.       PAST MEDICAL & SURGICAL HISTORY:  MR (mental retardation)  Hypothyroidism  Hydronephrosis with Renal and Ureteral Calculous Obstruction: at last admission in [place] ([place]) 2010  Nephrolithiasis  Bipolar Disorder  S/P Appendectomy: 1997    FAMILY HISTORY:  Family history of hyperparathyroidism (Sibling)    SOCIAL HISTORY:   T/E/D: denies    MEDICATIONS (HOME):  Home Medications:  acetaminophen 325 mg oral tablet: 2 tab(s) orally every 6 hours, As needed, For Temp greater than 38 C (100.4 F) (10 Jul 2017 12:41)  acetaminophen 325 mg oral tablet: 2 tab(s) orally every 6 hours, As needed, Moderate Pain (4 - 6) (10 Jul 2017 12:41)  albuterol 90 mcg/inh inhalation aerosol: 1 puff(s) inhaled every 4 hours (10 Jul 2017 12:41)  albuterol-ipratropium 2.5 mg-0.5 mg/3 mL inhalation solution: 3 milliliter(s) inhaled every 6 hours (10 Jul 2017 12:41)  benztropine 0.5 mg oral tablet: 1 tab(s) orally once a day (10 Jul 2017 12:41)  cholecalciferol 50,000 intl units oral capsule: 1 cap(s) orally once a week on Sunday (25 May 2017 09:12)  dicyclomine 20 mg oral tablet: 1 tab(s) orally 2 times a day (before meals) (10 Jul 2017 12:41)  divalproex sodium 250 mg oral delayed release tablet: 3 tab(s) orally 2 times a day (10 Jul 2017 12:41)  docusate sodium 100 mg oral capsule: 1 cap(s) orally 2 times a day (10 Jul 2017 12:41)  enoxaparin: 40 milligram(s) subcutaneous once a day (10 Jul 2017 12:41)  levothyroxine 75 mcg (0.075 mg) oral tablet: 1 tab(s) orally once a day (10 Jul 2017 12:41)  Metamucil 3.4 g/3.7 g oral powder for reconstitution: 30 milliliter(s) orally once a day mixed in water (25 May 2017 09:12)  multivitamin: 1 tab(s) orally once a day (25 May 2017 09:12)  omeprazole 40 mg oral delayed release capsule: 1 cap(s) orally 2 times a day (25 May 2017 09:12)  pantoprazole 40 mg oral delayed release tablet: 1 tab(s) orally once a day (before a meal) (10 Jul 2017 12:41)  polyethylene glycol 3350 oral powder for reconstitution: 17 gram(s) orally once a day (10 Jul 2017 12:41)  risperiDONE 0.5 mg oral tablet: 3 tab(s) orally 2 times a day (10 Jul 2017 12:41)  senna oral tablet: 2 tab(s) orally once a day (at bedtime), As needed, Constipation (10 Jul 2017 12:41)  simvastatin 20 mg oral tablet: 1 tab(s) orally once a day (at bedtime) (10 Jul 2017 12:41)  sodium biphosphate-sodium phosphate 7 g-19 g rectal enema: 1 each rectal once a day, As needed, constipation (10 Jul 2017 12:41)  sulfamethoxazole-trimethoprim 800 mg-160 mg oral tablet: 1 tab(s) orally 2 times a day (10 Jul 2017 12:41)  tamsulosin 0.4 mg oral capsule: 1 cap(s) orally once a day (at bedtime) (10 Jul 2017 12:41)  tiotropium 18 mcg inhalation capsule: 1 cap(s) inhaled once a day (10 Jul 2017 12:41)  Vitamin B2 100 mg oral tablet: 1 tab(s) orally once a day (25 May 2017 09:12)    POcHx (including surgeries/lasers/trauma):  None  Drops: None  FamHx: None  Social Hx: None  Allergies: NKDA    ROS:  General (neg), Vision (per HPI), Head and Neck (neg), Pulm (neg), CV (neg), GI (neg),  (neg), Musculoskeletal (neg), Skin/Integ (neg), Neuro (neg), Endocrine (neg), Heme (neg), All/Immuno (neg)    Mood and Affect Appropriate ( x ),  Oriented to Time, Place, and Person x 3 ( x )    Ophthalmology Exam    Visual acuity (sc): 20/20 OU  Pupils: PERRL OU, no APD  Ttono: 16 OU  Extraocular movements (EOMs): Full OU, no pain, no diplopia   Confrontational Visual Field (CVF):  Full OU  Color Plates: 12/12 OU    Pen Light Exam (PLE)  External:  Flat OU  Lids/Lashes/Lacrimal Ducts: Flat OU    Sclera/Conjunctiva:  W+Q OU  Cornea: Cl OU  Anterior Chamber: D+F OU  Iris:  Flat OU  Lens:  Cl OU    Fundus Exam: dilated with 1% tropicamide and 2.5% phenylephrine  Approval obtained from primary team for dilation  Patient aware that pupils can remained dilated for at least 4-6 hours  Exam performed with 20D lens    Vitreous: wnl OU  Disc, cup/disc: sharp and pink, 0.4 OU  Macula:  wnl OU  Vessels:  wnl OU  Periphery: wnl OU    Diagnostic Testing:  < from: CT Head No Cont (09.15.19 @ 18:37) >  FINDINGS:     There is no acute intracranial mass-effect, hemorrhage, midline shift, or   abnormal extra-axial fluid collection.    The gray-white matter junction is grossly preserved. Mild patchy   hypodensities in the periventricular and subcortical white matter   compatible with microvascular ischemic changes.    The ventricular size and configuration is unchanged as compared to   9/8/2016. Basal cisterns are patent.     Mild mucosal thickening of the left maxillary sinus and near complete   opacification of the bilateral ethmoid air cells. The remaining paranasal   sinuses and mastoid air cells are clear. Calvarium is intact.   Hyperostosis frontalis interna.    IMPRESSION:     No acute intracranial bleeding, mass effect, or shift. Stable ventricular   size and configuration.    If symptoms persist, correlate with neurologic evaluation to determine if   further evaluation with MRI is warranted, if there are no   contraindications.    < end of copied text >      Assessment/Plan:  59 year old M with a PMH of NPH (s/p drainage twice), hypothyroidism, and bipolar disorder presents with headaches for the past week. Physical exam significant for b/l LE weakness (L>R), left eye ptosis/painful eye movements, and diplopia.       Follow-Up:  Patient should follow up his/her ophthalmologist or in the [facility] Op[facility]mology Practice within 1 week of discharge.  [address] [place] [address] [phone] [facility] Ophthalmology Consult Note    HPI:  59 year old with a PMH of NPH (s/p drainage twice), hypothyroidism, and bipolar disorder presents with headaches for the past week. Patient states that since May 2019 he has become weaker. He was previously able to ambulate with a walker (for the past 5-6 years), but has been falling and requiring a wheelchair. He notes that his left lower extremity is weaker than his right and he endorses left hip pain. He notes the weakness particularly attempting to move from a sitting to a standing position (he falls back into the chair). He describes the headache as a 9/10, throbbing, bifrontal, periorbital, and occipital in location. He sometimes gets headaches, which are alleviated by tylenol but states that nothing has been helping his current headache. He endorses phonophobia and nausea. The nausea started two days ago. He has been having urinary incontinence chronically. He has not seen a neurologist since last year due to insurance issues. He also notes that since 9/11 or 9/12 he has been having a "puffy" left eye, blurry vision, left eye pain, and increased tearing bilaterally. He states that he wears bifocals with prism. He denies changes in hearing, vomiting, photophobia, change in speech, or dysphagia. Pt says he has double vision in his left eye only (vertical) even when right eye is covered. He also c/o b/l tearing and discomfort for few days which he stated also started when the symptoms started       PAST MEDICAL & SURGICAL HISTORY:  MR (mental retardation)  Hypothyroidism  Hydronephrosis with Renal and Ureteral Calculous Obstruction: at last admission in [place] ([place]) 2010  Nephrolithiasis  Bipolar Disorder  S/P Appendectomy: 1997    FAMILY HISTORY:  Family history of hyperparathyroidism (Sibling)    SOCIAL HISTORY:   T/E/D: denies    MEDICATIONS (HOME):  Home Medications:  acetaminophen 325 mg oral tablet: 2 tab(s) orally every 6 hours, As needed, For Temp greater than 38 C (100.4 F) (10 Jul 2017 12:41)  acetaminophen 325 mg oral tablet: 2 tab(s) orally every 6 hours, As needed, Moderate Pain (4 - 6) (10 Jul 2017 12:41)  albuterol 90 mcg/inh inhalation aerosol: 1 puff(s) inhaled every 4 hours (10 Jul 2017 12:41)  albuterol-ipratropium 2.5 mg-0.5 mg/3 mL inhalation solution: 3 milliliter(s) inhaled every 6 hours (10 Jul 2017 12:41)  benztropine 0.5 mg oral tablet: 1 tab(s) orally once a day (10 Jul 2017 12:41)  cholecalciferol 50,000 intl units oral capsule: 1 cap(s) orally once a week on Sunday (25 May 2017 09:12)  dicyclomine 20 mg oral tablet: 1 tab(s) orally 2 times a day (before meals) (10 Jul 2017 12:41)  divalproex sodium 250 mg oral delayed release tablet: 3 tab(s) orally 2 times a day (10 Jul 2017 12:41)  docusate sodium 100 mg oral capsule: 1 cap(s) orally 2 times a day (10 Jul 2017 12:41)  enoxaparin: 40 milligram(s) subcutaneous once a day (10 Jul 2017 12:41)  levothyroxine 75 mcg (0.075 mg) oral tablet: 1 tab(s) orally once a day (10 Jul 2017 12:41)  Metamucil 3.4 g/3.7 g oral powder for reconstitution: 30 milliliter(s) orally once a day mixed in water (25 May 2017 09:12)  multivitamin: 1 tab(s) orally once a day (25 May 2017 09:12)  omeprazole 40 mg oral delayed release capsule: 1 cap(s) orally 2 times a day (25 May 2017 09:12)  pantoprazole 40 mg oral delayed release tablet: 1 tab(s) orally once a day (before a meal) (10 Jul 2017 12:41)  polyethylene glycol 3350 oral powder for reconstitution: 17 gram(s) orally once a day (10 Jul 2017 12:41)  risperiDONE 0.5 mg oral tablet: 3 tab(s) orally 2 times a day (10 Jul 2017 12:41)  senna oral tablet: 2 tab(s) orally once a day (at bedtime), As needed, Constipation (10 Jul 2017 12:41)  simvastatin 20 mg oral tablet: 1 tab(s) orally once a day (at bedtime) (10 Jul 2017 12:41)  sodium biphosphate-sodium phosphate 7 g-19 g rectal enema: 1 each rectal once a day, As needed, constipation (10 Jul 2017 12:41)  sulfamethoxazole-trimethoprim 800 mg-160 mg oral tablet: 1 tab(s) orally 2 times a day (10 Jul 2017 12:41)  tamsulosin 0.4 mg oral capsule: 1 cap(s) orally once a day (at bedtime) (10 Jul 2017 12:41)  tiotropium 18 mcg inhalation capsule: 1 cap(s) inhaled once a day (10 Jul 2017 12:41)  Vitamin B2 100 mg oral tablet: 1 tab(s) orally once a day (25 May 2017 09:12)    POcHx (including surgeries/lasers/trauma):  None  Drops: None  FamHx: None  Social Hx: None  Allergies: NKDA    ROS:  General (neg), Vision (per HPI), Head and Neck (neg), Pulm (neg), CV (neg), GI (neg),  (neg), Musculoskeletal (neg), Skin/Integ (neg), Neuro (neg), Endocrine (neg), Heme (neg), All/Immuno (neg)    Mood and Affect Appropriate ( x ),  Oriented to Time, Place, and Person x 3 ( x )    Ophthalmology Exam    Visual acuity (sc): 20/50 OU PHNI  Pupils: PERRL OU, no APD, no anisocoria   Ttono: 19 OU  Extraocular movements (EOMs): Full OU, no pain, no diplopia   Confrontational Visual Field (CVF):  PAIGE 2/2 Poor cooperation  Color Plates: 8/12 OU    Pen Light Exam (PLE)  External:  Flat OU  Lids/Lashes/Lacrimal Ducts: Flat OU, Ptosis OS, high lid crease, difficult to asses levator function 2/2 poor cooperation    Sclera/Conjunctiva:  W+Q OU  Cornea: Dec TBUT OU  Anterior Chamber: D+F OU  Iris:  Flat OU  Lens:  Cl OU    Fundus Exam: Patient adamantly refused dilation. Permission eventually given to use Tropicamide only.     Vitreous: wnl OU  Disc, cup/disc: sharp and pink, 0.4 OU  Macula:  wnl OU  Vessels:  wnl OU  Periphery: wnl OU, limited view 2/2 poor cooperation    Diagnostic Testing:  < from: CT Head No Cont (09.15.19 @ 18:37) >  FINDINGS:     There is no acute intracranial mass-effect, hemorrhage, midline shift, or   abnormal extra-axial fluid collection.    The gray-white matter junction is grossly preserved. Mild patchy   hypodensities in the periventricular and subcortical white matter   compatible with microvascular ischemic changes.    The ventricular size and configuration is unchanged as compared to   9/8/2016. Basal cisterns are patent.     Mild mucosal thickening of the left maxillary sinus and near complete   opacification of the bilateral ethmoid air cells. The remaining paranasal   sinuses and mastoid air cells are clear. Calvarium is intact.   Hyperostosis frontalis interna.    IMPRESSION:     No acute intracranial bleeding, mass effect, or shift. Stable ventricular   size and configuration.    If symptoms persist, correlate with neurologic evaluation to determine if   further evaluation with MRI is warranted, if there are no   contraindications.    < end of copied text >      Assessment/Plan:  59 year old M with a PMH of NPH (s/p drainage twice), hypothyroidism, and bipolar disorder presents with headaches for the past week. Physical exam significant for b/l LE weakness (L>R), left eye ptosis/?painful eye movements, and diplopia in left eye only. Mocnocular causes of diplopia, less concerning for CNS abnormality, ptosis appears to be aponeurotic, EOM full and no anisocoria. My be related to ocular discomfort - pt unable to provide prior photos of himself. Neurology requests MRI brain  - will follow imaging       Follow-Up:  Patient should follow up his/her ophthalmologist or in the [facility]mology Practice within 1 week of discharge.  [address]  [place], [place] [id number] [phone] [facility] Ophthalmology Consult Note    HPI:  59 year old with a PMH of NPH (s/p drainage twice), hypothyroidism, and bipolar disorder presents with headaches for the past week. Patient states that since May 2019 he has become weaker. He was previously able to ambulate with a walker (for the past 5-6 years), but has been falling and requiring a wheelchair. He notes that his left lower extremity is weaker than his right and he endorses left hip pain. He notes the weakness particularly attempting to move from a sitting to a standing position (he falls back into the chair). He describes the headache as a 9/10, throbbing, bifrontal, periorbital, and occipital in location. He sometimes gets headaches, which are alleviated by tylenol but states that nothing has been helping his current headache. He endorses phonophobia and nausea. The nausea started two days ago. He has been having urinary incontinence chronically. He has not seen a neurologist since last year due to insurance issues. He also notes that since 9/11 or 9/12 he has been having a "puffy" left eye, blurry vision, left eye pain, and increased tearing bilaterally. He states that he wears bifocals with prism. He denies changes in hearing, vomiting, photophobia, change in speech, or dysphagia. Pt says he has double vision in his left eye only (vertical) even when right eye is covered. He also c/o b/l tearing and discomfort for few days which he stated also started when the symptoms started       PAST MEDICAL & SURGICAL HISTORY:  MR (mental retardation)  Hypothyroidism  Hydronephrosis with Renal and Ureteral Calculous Obstruction: at last admission in [place] ([place]) 2010  Nephrolithiasis  Bipolar Disorder  S/P Appendectomy: 1997    FAMILY HISTORY:  Family history of hyperparathyroidism (Sibling)    SOCIAL HISTORY:   T/E/D: denies    MEDICATIONS (HOME):  Home Medications:  acetaminophen 325 mg oral tablet: 2 tab(s) orally every 6 hours, As needed, For Temp greater than 38 C (100.4 F) (10 Jul 2017 12:41)  acetaminophen 325 mg oral tablet: 2 tab(s) orally every 6 hours, As needed, Moderate Pain (4 - 6) (10 Jul 2017 12:41)  albuterol 90 mcg/inh inhalation aerosol: 1 puff(s) inhaled every 4 hours (10 Jul 2017 12:41)  albuterol-ipratropium 2.5 mg-0.5 mg/3 mL inhalation solution: 3 milliliter(s) inhaled every 6 hours (10 Jul 2017 12:41)  benztropine 0.5 mg oral tablet: 1 tab(s) orally once a day (10 Jul 2017 12:41)  cholecalciferol 50,000 intl units oral capsule: 1 cap(s) orally once a week on Sunday (25 May 2017 09:12)  dicyclomine 20 mg oral tablet: 1 tab(s) orally 2 times a day (before meals) (10 Jul 2017 12:41)  divalproex sodium 250 mg oral delayed release tablet: 3 tab(s) orally 2 times a day (10 Jul 2017 12:41)  docusate sodium 100 mg oral capsule: 1 cap(s) orally 2 times a day (10 Jul 2017 12:41)  enoxaparin: 40 milligram(s) subcutaneous once a day (10 Jul 2017 12:41)  levothyroxine 75 mcg (0.075 mg) oral tablet: 1 tab(s) orally once a day (10 Jul 2017 12:41)  Metamucil 3.4 g/3.7 g oral powder for reconstitution: 30 milliliter(s) orally once a day mixed in water (25 May 2017 09:12)  multivitamin: 1 tab(s) orally once a day (25 May 2017 09:12)  omeprazole 40 mg oral delayed release capsule: 1 cap(s) orally 2 times a day (25 May 2017 09:12)  pantoprazole 40 mg oral delayed release tablet: 1 tab(s) orally once a day (before a meal) (10 Jul 2017 12:41)  polyethylene glycol 3350 oral powder for reconstitution: 17 gram(s) orally once a day (10 Jul 2017 12:41)  risperiDONE 0.5 mg oral tablet: 3 tab(s) orally 2 times a day (10 Jul 2017 12:41)  senna oral tablet: 2 tab(s) orally once a day (at bedtime), As needed, Constipation (10 Jul 2017 12:41)  simvastatin 20 mg oral tablet: 1 tab(s) orally once a day (at bedtime) (10 Jul 2017 12:41)  sodium biphosphate-sodium phosphate 7 g-19 g rectal enema: 1 each rectal once a day, As needed, constipation (10 Jul 2017 12:41)  sulfamethoxazole-trimethoprim 800 mg-160 mg oral tablet: 1 tab(s) orally 2 times a day (10 Jul 2017 12:41)  tamsulosin 0.4 mg oral capsule: 1 cap(s) orally once a day (at bedtime) (10 Jul 2017 12:41)  tiotropium 18 mcg inhalation capsule: 1 cap(s) inhaled once a day (10 Jul 2017 12:41)  Vitamin B2 100 mg oral tablet: 1 tab(s) orally once a day (25 May 2017 09:12)    POcHx (including surgeries/lasers/trauma):  None  Drops: None  FamHx: None  Social Hx: None  Allergies: NKDA    ROS:  General (neg), Vision (per HPI), Head and Neck (neg), Pulm (neg), CV (neg), GI (neg),  (neg), Musculoskeletal (neg), Skin/Integ (neg), Neuro (neg), Endocrine (neg), Heme (neg), All/Immuno (neg)    Mood and Affect Appropriate ( x ),  Oriented to Time, Place, and Person x 3 ( x )    Ophthalmology Exam    Visual acuity (sc): 20/50 OU PHNI  Pupils: PERRL OU, no APD, no anisocoria   Ttono: 19 OU  Extraocular movements (EOMs): Full OU, no pain, no diplopia   Confrontational Visual Field (CVF):  PAIGE 2/2 Poor cooperation  Color Plates: 8/12 OU    Pen Light Exam (PLE)  External:  Flat OU  Lids/Lashes/Lacrimal Ducts: Flat OU, Ptosis OS, high lid crease, difficult to asses levator function 2/2 poor cooperation    Sclera/Conjunctiva:  W+Q OU  Cornea: Dec TBUT OU  Anterior Chamber: D+F OU  Iris:  Flat OU  Lens:  Cl OU    Fundus Exam: Patient adamantly refused dilation. Permission eventually given to use Tropicamide only.     Vitreous: wnl OU  Disc, cup/disc: sharp and pink, 0.4 OU  Macula:  wnl OU  Vessels:  wnl OU  Periphery: wnl OU, limited view 2/2 poor cooperation    Diagnostic Testing:  < from: CT Head No Cont (09.15.19 @ 18:37) >  FINDINGS:     There is no acute intracranial mass-effect, hemorrhage, midline shift, or   abnormal extra-axial fluid collection.    The gray-white matter junction is grossly preserved. Mild patchy   hypodensities in the periventricular and subcortical white matter   compatible with microvascular ischemic changes.    The ventricular size and configuration is unchanged as compared to   9/8/2016. Basal cisterns are patent.     Mild mucosal thickening of the left maxillary sinus and near complete   opacification of the bilateral ethmoid air cells. The remaining paranasal   sinuses and mastoid air cells are clear. Calvarium is intact.   Hyperostosis frontalis interna.    IMPRESSION:     No acute intracranial bleeding, mass effect, or shift. Stable ventricular   size and configuration.    If symptoms persist, correlate with neurologic evaluation to determine if   further evaluation with MRI is warranted, if there are no   contraindications.    < end of copied text >      Assessment/Plan:  59 year old M with a PMH of NPH (s/p drainage twice), hypothyroidism, and bipolar disorder presents with headaches for the past week. Physical exam significant for b/l LE weakness (L>R), left eye ptosis/?painful eye movements, and diplopia in left eye only. Mocnocular causes of diplopia, less concerning for CNS abnormality, ptosis appears to be aponeurotic, EOM full and no anisocoria. My be related to ocular discomfort - pt unable to provide prior photos of himself. Neurology requests MRI brain  - will follow imaging   - start preservative free artificial tears Q2h OU      Follow-Up:  Patient should follow up his/her ophthalmologist or in the [facility] Practice within 1 week of discharge.  [address]  [place], [place] [id number] [phone]

## 2019-09-15 NOTE — ED PROVIDER NOTE - OBJECTIVE STATEMENT
60yo M  H/O Mental retardation , BPD, hypothyroidism, normal pressure hydrocephalus s/p drainage x2, Parkinson's disease vs drug-induced movement disorder, p/w several weeks of worsening constant generalized HA. Tried tylenol to no relief. Per family and pt, now unsteady on his feet and more confused than usual. Family states that sx are consistent with last time pt had worsening NPH and resolved with drainage (drained twice).    Sister: Sherry Daigle: [phone]

## 2019-09-15 NOTE — ED PROVIDER NOTE - ATTENDING CONTRIBUTION TO CARE
Attending MD Leon: I personally have seen and examined this patient.  Resident note reviewed and agree on plan of care and except where noted.  See below for details.     Seen in [facility] 7    reports headache has been present over the last few months, but worse over the last few weeks.  Reports last Wednesday woke up with a headache.  Harinder was seen by the NP at that time and prescribed him nasal spray and eye drops because his eyes were watering and stinging.  Harinder still has eye burning and tearing.  Denies fevers but reports face felt warm today. Harinder has a history of hydrocephalus that needed drainage and is concerned he has the same.  Denies abdominal pain, reports yesterday felt nauseated, denies emesis, unknown if has blood in stools.  Harinder wears pull ups for years for incontinence occasionally, usually at night.      TO BE COMPLETED Attending MD Leon: I personally have seen and examined this patient.  Resident note reviewed and agree on plan of care and except where noted.  See below for details.     Seen in [facility] 7    59M with PMH/PSH including NPH, hypothyroidism, nephrolithiasis, bipolar, s/p appendectomy presents to the ED with headache that has been present over the last few months, but worse over the last few weeks.  Reports last Wednesday woke up with a headache.  Harinder was seen by the NP at that time and prescribed him nasal spray and eye drops because his eyes were watering and stinging.  Reports still has eye burning and tearing.  Denies fevers but reports face felt warm today. Reports has a history of hydrocephalus that needed drainage and is concerned he has the same.  Denies change in vision.  Denies abdominal pain, reports yesterday felt nauseated, denies emesis, unknown if has blood in stools.  Reports wears pull ups for years for incontinence occasionally, usually at night.  Family reports patient not at baseline for ambulation or behavior.  Denies chest pain, shortness of breath.  On exam, NAD, AAOx3 (person, place, year), speaking intelligibly (family reports baseline), CN 2-12 grossly intact (mild facial asymmetry vs baseline, unclear, ptosis OS), head NCAT, PERRL, FROM at neck, no tenderness to midline palpation, no stepoffs along length of spine, lungs CTAB with good inspiratory effort, +S1S2, no m/r/g, abdomen soft with +BS, NT, ND, no CVAT, moving all extremities with good and equal strength bilaterally; A/P: 59M with headache, DDx includes migraines vs NPH, will obtain CT Head to evaluate ventricles, will obtain labs for metabolic derangement, will consult neuro given history of NPH, reassess

## 2019-09-15 NOTE — ED PROCEDURE NOTE - PROCEDURE ADDITIONAL DETAILS
POCUS: Emergency Department Focused Ultrasound performed at patient's bedside.  The complete report will be available in PACS.   PVR ~90 cc.

## 2019-09-15 NOTE — ED ADULT NURSE REASSESSMENT NOTE - NS ED NURSE REASSESS COMMENT FT1
Report received from BLAINE CARRILLO. Pt resting comfortably, NAD. Awaiting MD reassessment. VSS. Safety maintained at all times, bed in lowest position, call bell in reach. Will continue to monitor closely.

## 2019-09-15 NOTE — ED PROVIDER NOTE - PHYSICAL EXAMINATION
Gen: Well appearing, NAD  Head: NCAT  HEENT: PERRL, MMM, normal conjunctiva, anicteric, neck supple  Lung: CTAB, no adventitious sounds  CV: RRR, no murmurs  Abd: soft, NTND, no rebound or guarding, no CVAT  MSK: No edema, no visible deformities  Neuro: CN II-XII grossly intact. 5/5 strength and normal sensation in all extremities. Extremities tremulous  Skin: Warm and dry, no evidence of rash  Psych: normal mood and affect

## 2019-09-16 ENCOUNTER — TRANSCRIPTION ENCOUNTER (OUTPATIENT)
Age: 59
End: 2019-09-16

## 2019-09-16 DIAGNOSIS — R27.0 ATAXIA, UNSPECIFIED: ICD-10-CM

## 2019-09-16 DIAGNOSIS — R51 HEADACHE: ICD-10-CM

## 2019-09-16 DIAGNOSIS — G91.2 (IDIOPATHIC) NORMAL PRESSURE HYDROCEPHALUS: ICD-10-CM

## 2019-09-16 DIAGNOSIS — Z87.898 PERSONAL HISTORY OF OTHER SPECIFIED CONDITIONS: ICD-10-CM

## 2019-09-16 DIAGNOSIS — H53.2 DIPLOPIA: ICD-10-CM

## 2019-09-16 DIAGNOSIS — Z29.9 ENCOUNTER FOR PROPHYLACTIC MEASURES, UNSPECIFIED: ICD-10-CM

## 2019-09-16 DIAGNOSIS — F31.9 BIPOLAR DISORDER, UNSPECIFIED: ICD-10-CM

## 2019-09-16 DIAGNOSIS — E03.9 HYPOTHYROIDISM, UNSPECIFIED: ICD-10-CM

## 2019-09-16 LAB
ANION GAP SERPL CALC-SCNC: 12 MMOL/L — SIGNIFICANT CHANGE UP (ref 5–17)
BUN SERPL-MCNC: 21 MG/DL — SIGNIFICANT CHANGE UP (ref 7–23)
CALCIUM SERPL-MCNC: 9.3 MG/DL — SIGNIFICANT CHANGE UP (ref 8.4–10.5)
CHLORIDE SERPL-SCNC: 99 MMOL/L — SIGNIFICANT CHANGE UP (ref 96–108)
CO2 SERPL-SCNC: 27 MMOL/L — SIGNIFICANT CHANGE UP (ref 22–31)
CREAT SERPL-MCNC: 0.99 MG/DL — SIGNIFICANT CHANGE UP (ref 0.5–1.3)
CULTURE RESULTS: SIGNIFICANT CHANGE UP
GLUCOSE SERPL-MCNC: 91 MG/DL — SIGNIFICANT CHANGE UP (ref 70–99)
HCT VFR BLD CALC: 37.3 % — LOW (ref 39–50)
HGB BLD-MCNC: 12.2 G/DL — LOW (ref 13–17)
MAGNESIUM SERPL-MCNC: 2.1 MG/DL — SIGNIFICANT CHANGE UP (ref 1.6–2.6)
MCHC RBC-ENTMCNC: 30.7 PG — SIGNIFICANT CHANGE UP (ref 27–34)
MCHC RBC-ENTMCNC: 32.7 GM/DL — SIGNIFICANT CHANGE UP (ref 32–36)
MCV RBC AUTO: 93.7 FL — SIGNIFICANT CHANGE UP (ref 80–100)
PHOSPHATE SERPL-MCNC: 3.2 MG/DL — SIGNIFICANT CHANGE UP (ref 2.5–4.5)
PLATELET # BLD AUTO: 153 K/UL — SIGNIFICANT CHANGE UP (ref 150–400)
POTASSIUM SERPL-MCNC: 3.8 MMOL/L — SIGNIFICANT CHANGE UP (ref 3.5–5.3)
POTASSIUM SERPL-SCNC: 3.8 MMOL/L — SIGNIFICANT CHANGE UP (ref 3.5–5.3)
RBC # BLD: 3.98 M/UL — LOW (ref 4.2–5.8)
RBC # FLD: 13.2 % — SIGNIFICANT CHANGE UP (ref 10.3–14.5)
SODIUM SERPL-SCNC: 138 MMOL/L — SIGNIFICANT CHANGE UP (ref 135–145)
SPECIMEN SOURCE: SIGNIFICANT CHANGE UP
WBC # BLD: 5 K/UL — SIGNIFICANT CHANGE UP (ref 3.8–10.5)
WBC # FLD AUTO: 5 K/UL — SIGNIFICANT CHANGE UP (ref 3.8–10.5)

## 2019-09-16 PROCEDURE — 99222 1ST HOSP IP/OBS MODERATE 55: CPT

## 2019-09-16 PROCEDURE — 99223 1ST HOSP IP/OBS HIGH 75: CPT | Mod: AI,GC

## 2019-09-16 RX ORDER — DIVALPROEX SODIUM 500 MG/1
1000 TABLET, DELAYED RELEASE ORAL ONCE
Refills: 0 | Status: COMPLETED | OUTPATIENT
Start: 2019-09-16 | End: 2019-09-16

## 2019-09-16 RX ORDER — METOCLOPRAMIDE HCL 10 MG
10 TABLET ORAL ONCE
Refills: 0 | Status: COMPLETED | OUTPATIENT
Start: 2019-09-16 | End: 2019-09-16

## 2019-09-16 RX ORDER — ACETAMINOPHEN 500 MG
650 TABLET ORAL ONCE
Refills: 0 | Status: COMPLETED | OUTPATIENT
Start: 2019-09-16 | End: 2019-09-16

## 2019-09-16 RX ORDER — PANTOPRAZOLE SODIUM 20 MG/1
40 TABLET, DELAYED RELEASE ORAL
Refills: 0 | Status: DISCONTINUED | OUTPATIENT
Start: 2019-09-16 | End: 2019-09-19

## 2019-09-16 RX ORDER — TAMSULOSIN HYDROCHLORIDE 0.4 MG/1
0.4 CAPSULE ORAL AT BEDTIME
Refills: 0 | Status: DISCONTINUED | OUTPATIENT
Start: 2019-09-16 | End: 2019-09-19

## 2019-09-16 RX ORDER — KETOROLAC TROMETHAMINE 30 MG/ML
30 SYRINGE (ML) INJECTION ONCE
Refills: 0 | Status: DISCONTINUED | OUTPATIENT
Start: 2019-09-16 | End: 2019-09-16

## 2019-09-16 RX ORDER — ONDANSETRON 8 MG/1
4 TABLET, FILM COATED ORAL ONCE
Refills: 0 | Status: COMPLETED | OUTPATIENT
Start: 2019-09-16 | End: 2019-09-16

## 2019-09-16 RX ORDER — ACETAMINOPHEN 500 MG
650 TABLET ORAL EVERY 6 HOURS
Refills: 0 | Status: DISCONTINUED | OUTPATIENT
Start: 2019-09-16 | End: 2019-09-19

## 2019-09-16 RX ORDER — DIVALPROEX SODIUM 500 MG/1
500 TABLET, DELAYED RELEASE ORAL AT BEDTIME
Refills: 0 | Status: DISCONTINUED | OUTPATIENT
Start: 2019-09-16 | End: 2019-09-19

## 2019-09-16 RX ORDER — FINASTERIDE 5 MG/1
1 TABLET, FILM COATED ORAL
Qty: 0 | Refills: 0 | DISCHARGE

## 2019-09-16 RX ORDER — CALCIUM CARBONATE 500(1250)
1 TABLET ORAL
Refills: 0 | Status: DISCONTINUED | OUTPATIENT
Start: 2019-09-16 | End: 2019-09-19

## 2019-09-16 RX ORDER — LEVOTHYROXINE SODIUM 125 MCG
75 TABLET ORAL DAILY
Refills: 0 | Status: DISCONTINUED | OUTPATIENT
Start: 2019-09-16 | End: 2019-09-19

## 2019-09-16 RX ORDER — HEPARIN SODIUM 5000 [USP'U]/ML
5000 INJECTION INTRAVENOUS; SUBCUTANEOUS EVERY 8 HOURS
Refills: 0 | Status: DISCONTINUED | OUTPATIENT
Start: 2019-09-16 | End: 2019-09-19

## 2019-09-16 RX ORDER — DIVALPROEX SODIUM 500 MG/1
750 TABLET, DELAYED RELEASE ORAL DAILY
Refills: 0 | Status: DISCONTINUED | OUTPATIENT
Start: 2019-09-17 | End: 2019-09-19

## 2019-09-16 RX ORDER — SIMVASTATIN 20 MG/1
20 TABLET, FILM COATED ORAL AT BEDTIME
Refills: 0 | Status: DISCONTINUED | OUTPATIENT
Start: 2019-09-16 | End: 2019-09-19

## 2019-09-16 RX ORDER — DIVALPROEX SODIUM 500 MG/1
750 TABLET, DELAYED RELEASE ORAL ONCE
Refills: 0 | Status: COMPLETED | OUTPATIENT
Start: 2019-09-16 | End: 2019-09-16

## 2019-09-16 RX ORDER — CALCIUM CARBONATE 500(1250)
1 TABLET ORAL
Qty: 0 | Refills: 0 | DISCHARGE

## 2019-09-16 RX ORDER — RIBOFLAVIN (VITAMIN B2) 25 MG
1 TABLET ORAL
Qty: 0 | Refills: 0 | DISCHARGE

## 2019-09-16 RX ORDER — LORATADINE 10 MG/1
1 TABLET ORAL
Qty: 0 | Refills: 0 | DISCHARGE

## 2019-09-16 RX ORDER — FINASTERIDE 5 MG/1
5 TABLET, FILM COATED ORAL DAILY
Refills: 0 | Status: DISCONTINUED | OUTPATIENT
Start: 2019-09-16 | End: 2019-09-19

## 2019-09-16 RX ORDER — DIVALPROEX SODIUM 500 MG/1
250 TABLET, DELAYED RELEASE ORAL DAILY
Refills: 0 | Status: DISCONTINUED | OUTPATIENT
Start: 2019-09-16 | End: 2019-09-16

## 2019-09-16 RX ORDER — RISPERIDONE 4 MG/1
1.5 TABLET ORAL
Refills: 0 | Status: DISCONTINUED | OUTPATIENT
Start: 2019-09-16 | End: 2019-09-19

## 2019-09-16 RX ORDER — DIVALPROEX SODIUM 500 MG/1
2 TABLET, DELAYED RELEASE ORAL
Qty: 0 | Refills: 0 | DISCHARGE

## 2019-09-16 RX ORDER — BENZTROPINE MESYLATE 1 MG
0.5 TABLET ORAL DAILY
Refills: 0 | Status: DISCONTINUED | OUTPATIENT
Start: 2019-09-16 | End: 2019-09-19

## 2019-09-16 RX ORDER — INFLUENZA VIRUS VACCINE 15; 15; 15; 15 UG/.5ML; UG/.5ML; UG/.5ML; UG/.5ML
0.5 SUSPENSION INTRAMUSCULAR ONCE
Refills: 0 | Status: DISCONTINUED | OUTPATIENT
Start: 2019-09-16 | End: 2019-09-19

## 2019-09-16 RX ORDER — LANOLIN ALCOHOL/MO/W.PET/CERES
2 CREAM (GRAM) TOPICAL
Qty: 0 | Refills: 0 | DISCHARGE

## 2019-09-16 RX ADMIN — SODIUM CHLORIDE 1000 MILLILITER(S): 9 INJECTION INTRAMUSCULAR; INTRAVENOUS; SUBCUTANEOUS at 02:57

## 2019-09-16 RX ADMIN — HEPARIN SODIUM 5000 UNIT(S): 5000 INJECTION INTRAVENOUS; SUBCUTANEOUS at 13:21

## 2019-09-16 RX ADMIN — Medication 20 MILLIGRAM(S): at 13:20

## 2019-09-16 RX ADMIN — Medication 650 MILLIGRAM(S): at 05:46

## 2019-09-16 RX ADMIN — Medication 10 MILLIGRAM(S): at 03:15

## 2019-09-16 RX ADMIN — DIVALPROEX SODIUM 750 MILLIGRAM(S): 500 TABLET, DELAYED RELEASE ORAL at 13:17

## 2019-09-16 RX ADMIN — Medication 0.5 MILLIGRAM(S): at 18:28

## 2019-09-16 RX ADMIN — ONDANSETRON 4 MILLIGRAM(S): 8 TABLET, FILM COATED ORAL at 13:21

## 2019-09-16 RX ADMIN — SIMVASTATIN 20 MILLIGRAM(S): 20 TABLET, FILM COATED ORAL at 23:36

## 2019-09-16 RX ADMIN — DIVALPROEX SODIUM 1000 MILLIGRAM(S): 500 TABLET, DELAYED RELEASE ORAL at 03:19

## 2019-09-16 RX ADMIN — Medication 650 MILLIGRAM(S): at 11:18

## 2019-09-16 RX ADMIN — Medication 15 MILLIGRAM(S): at 02:10

## 2019-09-16 RX ADMIN — Medication 975 MILLIGRAM(S): at 02:10

## 2019-09-16 RX ADMIN — Medication 75 MICROGRAM(S): at 07:57

## 2019-09-16 RX ADMIN — HEPARIN SODIUM 5000 UNIT(S): 5000 INJECTION INTRAVENOUS; SUBCUTANEOUS at 23:35

## 2019-09-16 RX ADMIN — Medication 650 MILLIGRAM(S): at 10:38

## 2019-09-16 RX ADMIN — TAMSULOSIN HYDROCHLORIDE 0.4 MILLIGRAM(S): 0.4 CAPSULE ORAL at 23:36

## 2019-09-16 RX ADMIN — FINASTERIDE 5 MILLIGRAM(S): 5 TABLET, FILM COATED ORAL at 10:38

## 2019-09-16 RX ADMIN — Medication 650 MILLIGRAM(S): at 06:39

## 2019-09-16 RX ADMIN — Medication 20 MILLIGRAM(S): at 18:29

## 2019-09-16 RX ADMIN — PANTOPRAZOLE SODIUM 40 MILLIGRAM(S): 20 TABLET, DELAYED RELEASE ORAL at 10:39

## 2019-09-16 RX ADMIN — ONDANSETRON 4 MILLIGRAM(S): 8 TABLET, FILM COATED ORAL at 05:45

## 2019-09-16 NOTE — DISCHARGE NOTE PROVIDER - HOSPITAL COURSE
59 year old with a PMH of NPH (s/p drainage twice), hypothyroidism, and bipolar disorder presents with headaches for the past week. Patient states that since May 2019 he has become weaker. He was previously able to ambulate with a walker (for the past 5-6 years), but has been falling and requiring a wheelchair. He notes that his left lower extremity is weaker than his right and he endorses left hip pain. He notes the weakness particularly attempting to move from a sitting to a standing position (he falls back into the chair). He describes the headache as a 9/10, throbbing, bifrontal, periorbital, and occipital in location. He sometimes gets headaches, which are alleviated by tylenol but states that nothing has been helping his current headache. He endorses phonophobia and nausea. The nausea started two days ago. He has been having urinary incontinence chronically. He has not seen a neurologist since last year due to insurance issues. He also notes that since 9/11 or 9/12 he has been having a "puffy" left eye, blurry vision, left eye pain, and increased tearing bilaterally. He states that he wears bifocals with prism. He denies changes in hearing, vomiting, photophobia, change in speech, or dysphagia. Pt says he has double vision in his left eye only (vertical) even when right eye is covered. He also c/o b/l tearing and discomfort for few days which he stated also started when the symptoms started         ED VS: Afebrile HR 60-90s, -150/80s, SpO2 100% on RA    ED gave 1L NS        On the floor, patient remained hemodynamically stable. Was evaluated by neurology, recommended MRI brain and C-spine, which showed _____. Patient's headache and nausea were improved with tylenol and zofran. Patient's symptoms improved during the course of hospitalization. He is currently medically stable and appropriate for discharge home with close outpatient follow up. 59 year old with a PMH of NPH (s/p drainage twice), hypothyroidism, and bipolar disorder presents with headaches for the past week. Patient states that since May 2019 he has become weaker. He was previously able to ambulate with a walker (for the past 5-6 years), but has been falling and requiring a wheelchair. He notes that his left lower extremity is weaker than his right and he endorses left hip pain. He notes the weakness particularly attempting to move from a sitting to a standing position (he falls back into the chair). He describes the headache as a 9/10, throbbing, bifrontal, periorbital, and occipital in location. He sometimes gets headaches, which are alleviated by tylenol but states that nothing has been helping his current headache. He endorses phonophobia and nausea. The nausea started two days ago. He has been having urinary incontinence chronically. He has not seen a neurologist since last year due to insurance issues. He also notes that since 9/11 or 9/12 he has been having a "puffy" left eye, blurry vision, left eye pain, and increased tearing bilaterally. He states that he wears bifocals with prism. He denies changes in hearing, vomiting, photophobia, change in speech, or dysphagia. Pt says he has double vision in his left eye only (vertical) even when right eye is covered. He also c/o b/l tearing and discomfort for few days which he stated also started when the symptoms started.         ED VS: Afebrile HR 60-90s, -150/80s, SpO2 100% on RA    ED gave 1L NS        On the floor, patient remained hemodynamically stable. Was evaluated by neurology, recommended MRI brain and C-spine, which showed no acute pathology, evidence of sinusitis. Patient was evaluated by ophthalmology, no acute pathology noted. Patient's headache and nausea were improved with tylenol and zofran. Patient's symptoms improved during the course of hospitalization. He is currently medically stable and appropriate for discharge home with close outpatient follow up. 59 year old with a PMH of NPH (s/p drainage twice), hypothyroidism, and bipolar disorder presents with headaches for the past week. Patient states that since May 2019 he has become weaker. He was previously able to ambulate with a walker (for the past 5-6 years), but has been falling and requiring a wheelchair. He notes that his left lower extremity is weaker than his right and he endorses left hip pain. He notes the weakness particularly attempting to move from a sitting to a standing position (he falls back into the chair). He describes the headache as a 9/10, throbbing, bifrontal, periorbital, and occipital in location. He sometimes gets headaches, which are alleviated by tylenol but states that nothing has been helping his current headache. He endorses phonophobia and nausea. The nausea started two days ago. He has been having urinary incontinence chronically. He has not seen a neurologist since last year due to insurance issues. He also notes that since 9/11 or 9/12 he has been having a "puffy" left eye, blurry vision, left eye pain, and increased tearing bilaterally. He states that he wears bifocals with prism. He denies changes in hearing, vomiting, photophobia, change in speech, or dysphagia. Pt says he has double vision in his left eye only (vertical) even when right eye is covered. He also c/o b/l tearing and discomfort for few days which he stated also started when the symptoms started.         ED VS: Afebrile HR 60-90s, -150/80s, SpO2 100% on RA    ED gave 1L NS        On the floor, patient remained hemodynamically stable. Was evaluated by neurology, recommended MRI brain and C-spine, which showed no acute pathology, evidence of sinusitis. Patient was evaluated by ophthalmology, no acute pathology noted. Patient's headache and nausea were improved with tylenol and zofran. Patient's symptoms improved during the course of hospitalization. He is currently medically stable and appropriate for discharge to subacute rehabilitation with close outpatient follow up.

## 2019-09-16 NOTE — H&P ADULT - NSHPPHYSICALEXAM_GEN_ALL_CORE
ICU Vital Signs Last 24 Hrs  T(C): 37.1 (16 Sep 2019 06:45), Max: 37.4 (15 Sep 2019 17:59)  T(F): 98.8 (16 Sep 2019 06:45), Max: 99.3 (15 Sep 2019 17:59)  HR: 60 (16 Sep 2019 06:45) (60 - 93)  BP: 103/79 (16 Sep 2019 06:45) (103/79 - 149/85)  BP(mean): 89 (16 Sep 2019 06:45) (89 - 89)  RR: 16 (16 Sep 2019 06:45) (16 - 20)  SpO2: 99% (16 Sep 2019 06:45) (96% - 100%)    GENERAL: NAD  HEENT: EOMI, PERRL, MMM, no oropharyngeal lesions or erythema appreciated  Pulm: normal work of breathing, CTABL  CV: RRR, S1&S2+, no m/r/g appreciated  ABDOMEN: +BS, soft, nt, nd, no hepatosplenomegaly  MSK: nl ROM  EXTREMITIES:  no appreciable edema in b/l LE  Neuro: A&Ox3, 5/5 strength of right upper and right lower extremity, 3/5 strength for left lower extremity, 4/5 strenght of left upper extremity   SKIN: warm and dry, no visible rash ICU Vital Signs Last 24 Hrs  T(C): 37.1 (16 Sep 2019 06:45), Max: 37.4 (15 Sep 2019 17:59)  T(F): 98.8 (16 Sep 2019 06:45), Max: 99.3 (15 Sep 2019 17:59)  HR: 60 (16 Sep 2019 06:45) (60 - 93)  BP: 103/79 (16 Sep 2019 06:45) (103/79 - 149/85)  BP(mean): 89 (16 Sep 2019 06:45) (89 - 89)  RR: 16 (16 Sep 2019 06:45) (16 - 20)  SpO2: 99% (16 Sep 2019 06:45) (96% - 100%)    GENERAL: NAD  HEENT: EOMI, L eye, MMM, no oropharyngeal lesions or erythema appreciated  Pulm: normal work of breathing, CTABL  CV: RRR, S1&S2+, no m/r/g appreciated  ABDOMEN: +BS, soft, nt, nd, no hepatosplenomegaly  MSK: nl ROM  EXTREMITIES:  no appreciable edema in b/l LE  Neuro: A&Ox3, 5/5 strength of right upper and right lower extremity, 3/5 strength for left lower extremity, 4/5 strenght of left upper extremity   SKIN: warm and dry, no visible rash ICU Vital Signs Last 24 Hrs  T(C): 37.1 (16 Sep 2019 06:45), Max: 37.4 (15 Sep 2019 17:59)  T(F): 98.8 (16 Sep 2019 06:45), Max: 99.3 (15 Sep 2019 17:59)  HR: 60 (16 Sep 2019 06:45) (60 - 93)  BP: 103/79 (16 Sep 2019 06:45) (103/79 - 149/85)  BP(mean): 89 (16 Sep 2019 06:45) (89 - 89)  RR: 16 (16 Sep 2019 06:45) (16 - 20)  SpO2: 99% (16 Sep 2019 06:45) (96% - 100%)    GENERAL: NAD  HEENT: EOMI, L eye ptosis, MMM, no oropharyngeal lesions or erythema appreciated  Pulm: normal work of breathing, CTABL  CV: RRR, S1&S2+, no m/r/g appreciated  ABDOMEN: +BS, soft, nt, nd, no hepatosplenomegaly  MSK: nl ROM  EXTREMITIES:  no appreciable edema in b/l LE  Neuro: A&Ox3, 5/5 strength of right upper and right lower extremity, 3/5 strength for left lower extremity, 4/5 strenght of left upper extremity   SKIN: warm and dry, no visible rash

## 2019-09-16 NOTE — H&P ADULT - PROBLEM SELECTOR PLAN 2
Etiology unclear. Possibly progression of NPH. Appreciate Neuro recs.   - MRI brain as above Etiology unclear. Possibly old stroke vs progression of NPH. Appreciate Neuro recs. NPH progression seems less likely given CT findings    - MRI brain as above

## 2019-09-16 NOTE — DISCHARGE NOTE PROVIDER - NSDCCPCAREPLAN_GEN_ALL_CORE_FT
PRINCIPAL DISCHARGE DIAGNOSIS  Diagnosis: Headache  Assessment and Plan of Treatment: You came to the hospital because you had a headache. We performed  a CT scan of your head, which was normal. You were seen by the neurology service. They recommended getting an MRI scan of your brain and spine, which showed _____. Please see your primary care doctor within 1 week of discharge for further follow up.      SECONDARY DISCHARGE DIAGNOSES  Diagnosis: Ataxia  Assessment and Plan of Treatment: PRINCIPAL DISCHARGE DIAGNOSIS  Diagnosis: Headache  Assessment and Plan of Treatment: You came to the hospital because you had a headache. We performed  a CT scan of your head, which was normal. You were seen by the neurology service. They recommended getting an MRI scan of your brain and spine, which showed stable size of the cerebral ventricles from a previous scan. The scan did show evidence of sinusitis, for which you may use flonase spray after you leave the hospital. Please see your primary care doctor within 1 week of discharge for further follow up. You should also follow up with a neurologist to discuss further management of your weakness and headaches.

## 2019-09-16 NOTE — PROGRESS NOTE ADULT - SUBJECTIVE AND OBJECTIVE BOX
TOM Martin, PGY 1  Pager [phone]    Patient is a 59y old  Male who presents with a chief complaint of Headache (16 Sep 2019 06:41)    SUBJECTIVE / OVERNIGHT EVENTS: No acute overnight events. Patient seen and examined at bedside this AM, A&Ox3. Patient reports 10/10 frontal headache. Says it has worsened to this level over the past 2 weeks. Usually relieved with tylenol. Also reports increased weakness over the last 2-3 weeks, is generally able to ambulate using a walker, but has been requiring a wheelchair more recently. Patient states he feels "dizzy and nauseous" but denies changes in vision and vomiting. Reports shortness of breath, alleviated with supplemental oxygen. Denies chest pain, abdominal pain, diarrhea.     MEDICATIONS  (STANDING):  benztropine 0.5 milliGRAM(s) Oral daily  calcium carbonate   1250 mG (OsCal) 1 Tablet(s) Oral two times a day  dicyclomine 20 milliGRAM(s) Oral four times a day before meals  diVALproex  milliGRAM(s) Oral at bedtime  diVALproex  milliGRAM(s) Oral daily  finasteride 5 milliGRAM(s) Oral daily  heparin  Injectable 5000 Unit(s) SubCutaneous every 8 hours  levothyroxine 75 MICROGram(s) Oral daily  pantoprazole    Tablet 40 milliGRAM(s) Oral before breakfast  risperiDONE   Tablet 1.5 milliGRAM(s) Oral two times a day  simvastatin 20 milliGRAM(s) Oral at bedtime  tamsulosin 0.4 milliGRAM(s) Oral at bedtime    MEDICATIONS  (PRN):    VITAL SIGNS:  T(C): 37.2 (19 @ 07:52), Max: 37.4 (09-15-19 @ 17:59)  HR: 60 (19 @ 07:52) (60 - 93)  BP: 105/71 (19 @ 07:52) (103/79 - 149/85)  RR: 16 (19 @ 07:52) (16 - 20)  SpO2: 100% (19 @ 07:52) (96% - 100%)    PHYSICAL EXAM  GENERAL: NAD, well-developed  NEURO: PERRLA, EOMI, 5/5 strength RLE and RUE, 4/5 LLE and LUE, sensation intact, +left eye ptosis  HEAD:  Atraumatic, Normocephalic  EYES: conjunctiva and sclera clear  NECK: Supple, No JVD, no lymphadenopathy, no thyromegaly  CHEST/LUNG: Clear to auscultation bilaterally; No wheezes, rales or rhonchi  HEART: Regular rate and rhythm; No murmurs, rubs, or gallops  ABDOMEN: Soft, Nontender, Nondistended; Bowel sounds present, no masses.  EXTREMITIES:  2+ Peripheral Pulses, No clubbing, cyanosis, or edema  SKIN: Warm, dry, intact, no rashes or lesions  PSYCH: affect and behavior appropriate for setting    LABS:                        13.6   8.8   )-----------( 160      ( 15 Sep 2019 17:57 )             40.1     -    138  |  99  |  21  ----------------------------<  91  3.8   |  27  |  0.99    Ca    9.3      16 Sep 2019 06:36  Phos  3.2     -  Mg     2.1     -    TPro  7.4  /  Alb  4.3  /  TBili  0.3  /  DBili  x   /  AST  16  /  ALT  13  /  AlkPhos  47  09-15      Urinalysis Basic - ( 15 Sep 2019 17:57 )    Color: Yellow / Appearance: Slightly Turbid / S.032 / pH: x  Gluc: x / Ketone: Small  / Bili: Negative / Urobili: 2 mg/dL   Blood: x / Protein: 30 mg/dL / Nitrite: Negative   Leuk Esterase: Negative / RBC: 8 /hpf / WBC 4 /HPF   Sq Epi: x / Non Sq Epi: 1 /hpf / Bacteria: Negative      < from: CT Head No Cont (09.15.19 @ 18:37) >    IMPRESSION:     No acute intracranial bleeding, mass effect, or shift. Stable ventricular   size and configuration.    If symptoms persist, correlate with neurologic evaluation to determine if   further evaluation with MRI is warranted, if there are no   contraindications.    < end of copied text > TOM Martin, PGY 1  Pager [phone]    Patient is a 59y old  Male who presents with a chief complaint of Headache (16 Sep 2019 06:41)    SUBJECTIVE / OVERNIGHT EVENTS: No acute overnight events. Patient seen and examined at bedside this AM, A&Ox3. Patient reports 10/10 frontal headache. Says it has worsened to this level over the past 2 weeks. Usually relieved with tylenol. Also reports increased weakness over the last 2-3 weeks, is generally able to ambulate using a walker, but has been requiring a wheelchair more recently. Patient states he feels "dizzy and nauseous" but denies changes in vision and vomiting. Reports shortness of breath, alleviated with supplemental oxygen. Denies chest pain, abdominal pain, diarrhea.     MEDICATIONS  (STANDING):  benztropine 0.5 milliGRAM(s) Oral daily  calcium carbonate   1250 mG (OsCal) 1 Tablet(s) Oral two times a day  dicyclomine 20 milliGRAM(s) Oral four times a day before meals  diVALproex  milliGRAM(s) Oral at bedtime  diVALproex  milliGRAM(s) Oral daily  finasteride 5 milliGRAM(s) Oral daily  heparin  Injectable 5000 Unit(s) SubCutaneous every 8 hours  levothyroxine 75 MICROGram(s) Oral daily  pantoprazole    Tablet 40 milliGRAM(s) Oral before breakfast  risperiDONE   Tablet 1.5 milliGRAM(s) Oral two times a day  simvastatin 20 milliGRAM(s) Oral at bedtime  tamsulosin 0.4 milliGRAM(s) Oral at bedtime    MEDICATIONS  (PRN):    VITAL SIGNS:  T(C): 37.2 (19 @ 07:52), Max: 37.4 (09-15-19 @ 17:59)  HR: 60 (19 @ 07:52) (60 - 93)  BP: 105/71 (19 @ 07:52) (103/79 - 149/85)  RR: 16 (19 @ 07:52) (16 - 20)  SpO2: 100% (19 @ 07:52) (96% - 100%)    PHYSICAL EXAM  GENERAL: NAD, well-developed  NEURO: PERRLA, EOMI, 5/5 strength RLE and RUE, 4/5 LUE, refuses to move LLE, sensation intact, +left eye ptosis  HEAD:  Atraumatic, Normocephalic  EYES: conjunctiva and sclera clear  NECK: Supple, No JVD, no lymphadenopathy, no thyromegaly  CHEST/LUNG: Clear to auscultation bilaterally; No wheezes, rales or rhonchi  HEART: Regular rate and rhythm; No murmurs, rubs, or gallops  ABDOMEN: Soft, Nontender, Nondistended; Bowel sounds present, no masses.  EXTREMITIES:  2+ Peripheral Pulses, No clubbing, cyanosis, or edema  SKIN: Warm, dry, intact, no rashes or lesions  PSYCH: affect and behavior appropriate for setting    LABS:                        13.6   8.8   )-----------( 160      ( 15 Sep 2019 17:57 )             40.1     09-    138  |  99  |  21  ----------------------------<  91  3.8   |  27  |  0.99    Ca    9.3      16 Sep 2019 06:36  Phos  3.2     -  Mg     2.1     -    TPro  7.4  /  Alb  4.3  /  TBili  0.3  /  DBili  x   /  AST  16  /  ALT  13  /  AlkPhos  47  09-15      Urinalysis Basic - ( 15 Sep 2019 17:57 )    Color: Yellow / Appearance: Slightly Turbid / S.032 / pH: x  Gluc: x / Ketone: Small  / Bili: Negative / Urobili: 2 mg/dL   Blood: x / Protein: 30 mg/dL / Nitrite: Negative   Leuk Esterase: Negative / RBC: 8 /hpf / WBC 4 /HPF   Sq Epi: x / Non Sq Epi: 1 /hpf / Bacteria: Negative      < from: CT Head No Cont (09.15.19 @ 18:37) >    IMPRESSION:     No acute intracranial bleeding, mass effect, or shift. Stable ventricular   size and configuration.    If symptoms persist, correlate with neurologic evaluation to determine if   further evaluation with MRI is warranted, if there are no   contraindications.    < end of copied text >

## 2019-09-16 NOTE — H&P ADULT - NSHPREVIEWOFSYSTEMS_GEN_ALL_CORE
REVIEW OF SYSTEMS:    CONSTITUTIONAL: No fevers or chills  EYES/ENT: +double vision;  no throat pain   NECK: No pain or stiffness  RESPIRATORY: No cough, wheezing, hemoptysis; +SOB  CARDIOVASCULAR: No chest pain or palpitations  GASTROINTESTINAL: No abdominal or epigastric pain. No nausea, vomiting, or hematemesis; No diarrhea or constipation. No melena or hematochezia.  GENITOURINARY: No dysuria, change in frequency or hematuria  NEUROLOGICAL: +left sided weakness x years, No numbness  SKIN: No itching, burning, rashes, or lesions   All other review of systems is negative unless indicated above.

## 2019-09-16 NOTE — DISCHARGE NOTE PROVIDER - CARE PROVIDER_API CALL
Carlo Snowden)  Neurology  Epilepsy  [address], [address]  [place] [address]  Phone: [phone]  Follow Up Time:

## 2019-09-16 NOTE — CHART NOTE - NSCHARTNOTEFT_GEN_A_CORE
Spoke with patient's sister Sherry Lyman ([phone]) to obtain collateral information. Ms. Lyman corroborates patient's history- increasing weakness over the last 3 months, now relying on wheelchair for ambulation (had been able to walk with rolling walker in the past), also with worsening headache over the last week. Patient has seen a neurologist before, outpatient records are in allscripts. Last LP was in 2014, reportedly had little to no improvement of NPH symptoms s/p procedure. Left-sided weakness has been documented previously, outpatient neurologist believes Parkinsonian symptoms may be related to chronic use of psychiatric medication. Ms. Lyman states that she and outpatient providers do not want psychiatric medication regimen to change, as the patient's mood and behavior have been well controlled with meds at current doses for the last 10-15 years. Ms. Lyman requests her brother not be informed that psychiatric medication may be contributing to his symptoms, as she fears he will stop taking it and his condition will worsen. Additionally, Ms. Lyman says that her brother has had dependence on opioid and anxiolytic medication in the past, requests that he only receive tylenol and advil for pain control. Patient lives at [facility], has aides 7 days/week and rehab services daily.    Mine Martin, PGY 1  Pager [phone]

## 2019-09-16 NOTE — H&P ADULT - PROBLEM SELECTOR PLAN 4
Birgit C/w depakote CT demonstrates the ventricular size and configuration is unchanged as compared to 9/8/2016. Basal cisterns are patent.   - MRI as above

## 2019-09-16 NOTE — H&P ADULT - PROBLEM SELECTOR PLAN 1
Etiology unclear. DDx includes cluster, migraine, orbital cellulitis vs NPH.    Appreciate Neuro recs:   - MRI brain / contrast   - PT/OT evaluation  - Will consider LP after PT/OT

## 2019-09-16 NOTE — DISCHARGE NOTE PROVIDER - NSDCFUSCHEDAPPT_GEN_ALL_CORE_FT
CHERIE BARRY ; 11/12/2019 ; [facility] [address] CHERIE BARRY ; 11/12/2019 ; [facility] [address] CHERIE BARRY ; 11/12/2019 ; [facility] [address]

## 2019-09-16 NOTE — H&P ADULT - PROBLEM SELECTOR PLAN 3
CT demonstrates the ventricular size and configuration is unchanged as compared to 9/8/2016. Basal cisterns are patent.   - MRI as above Etiology unclear. Appreciate neuro and optho input. Lower concern for central etiology given monocular nature.    - F/u MRI

## 2019-09-16 NOTE — H&P ADULT - ASSESSMENT
59 year old man w/ hx NPH, hypothyroidism, bipolar disorder p/w headaches x 5 days a/w left eye ptosis and diplopia 59 year old man w/ hx NPH, hypothyroidism, bipolar disorder a/w headaches x 5 days and left eye ptosis and diplopia

## 2019-09-16 NOTE — H&P ADULT - NSICDXPASTMEDICALHX_GEN_ALL_CORE_FT
PAST MEDICAL HISTORY:  Bipolar Disorder     Hydronephrosis with Renal and Ureteral Calculous Obstruction at last admission in [facility] ([place]) 2010    Hypothyroidism     MR (mental retardation)     Nephrolithiasis

## 2019-09-16 NOTE — H&P ADULT - HISTORY OF PRESENT ILLNESS
59 year old with a PMH of NPH (s/p drainage twice), hypothyroidism, and bipolar disorder presents with headaches for the past week. Patient states that since May 2019 he has become weaker. He was previously able to ambulate with a walker (for the past 5-6 years), but has been falling and requiring a wheelchair. He notes that his left lower extremity is weaker than his right and he endorses left hip pain. He notes the weakness particularly attempting to move from a sitting to a standing position (he falls back into the chair). He describes the headache as a 9/10, throbbing, bifrontal, periorbital, and occipital in location. He sometimes gets headaches, which are alleviated by tylenol but states that nothing has been helping his current headache. He endorses phonophobia and nausea. The nausea started two days ago. He has been having urinary incontinence chronically. He has not seen a neurologist since last year due to insurance issues. He also notes that since 9/11 or 9/12 he has been having a "puffy" left eye, blurry vision, left eye pain, and increased tearing bilaterally. He states that he wears bifocals with prism. He denies changes in hearing, vomiting, photophobia, change in speech, or dysphagia. Pt says he has double vision in his left eye only (vertical) even when right eye is covered. He also c/o b/l tearing and discomfort for few days which he stated also started when the symptoms started     EDVS Afebrile HR 6-90s, -150/80s, SpO2 100% on RA  ED gave 1L NS 59 year old with a PMH of NPH (s/p drainage twice), hypothyroidism, and bipolar disorder presents with headaches for the past week. Patient states that since May 2019 he has become weaker. He was previously able to ambulate with a walker (for the past 5-6 years), but has been falling and requiring a wheelchair. He notes that his left lower extremity is weaker than his right and he endorses left hip pain. He notes the weakness particularly attempting to move from a sitting to a standing position (he falls back into the chair). He describes the headache as a 9/10, throbbing, bifrontal, periorbital, and occipital in location. He sometimes gets headaches, which are alleviated by tylenol but states that nothing has been helping his current headache. He endorses phonophobia and nausea. The nausea started two days ago. He has been having urinary incontinence chronically. He has not seen a neurologist since last year due to insurance issues. He also notes that since 9/11 or 9/12 he has been having a "puffy" left eye, blurry vision, left eye pain, and increased tearing bilaterally. He states that he wears bifocals with prism. He denies changes in hearing, vomiting, photophobia, change in speech, or dysphagia. Pt says he has double vision in his left eye only (vertical) even when right eye is covered. He also c/o b/l tearing and discomfort for few days which he stated also started when the symptoms started     EDVS Afebrile HR 60-90s, -150/80s, SpO2 100% on RA  ED gave 1L NS

## 2019-09-16 NOTE — H&P ADULT - NSICDXFAMILYHX_GEN_ALL_CORE_FT
FAMILY HISTORY:  Sibling  Still living? Yes, Estimated age: 51-60  Family history of hyperparathyroidism, Age at diagnosis: Age Unknown

## 2019-09-16 NOTE — H&P ADULT - NSHPLABSRESULTS_GEN_ALL_CORE
13.6   8.8   )-----------( 160      ( 15 Sep 2019 17:57 )             40.1       09-15    141  |  101  |  19  ----------------------------<  105<H>  4.1   |  25  |  1.02    Ca    9.4      15 Sep 2019 17:57    TPro  7.4  /  Alb  4.3  /  TBili  0.3  /  DBili  x   /  AST  16  /  ALT  13  /  AlkPhos  47  09-15        Urinalysis Basic - ( 15 Sep 2019 17:57 )    Color: Yellow / Appearance: Slightly Turbid / S.032 / pH: x  Gluc: x / Ketone: Small  / Bili: Negative / Urobili: 2 mg/dL   Blood: x / Protein: 30 mg/dL / Nitrite: Negative   Leuk Esterase: Negative / RBC: 8 /hpf / WBC 4 /HPF   Sq Epi: x / Non Sq Epi: 1 /hpf / Bacteria: Negative      Cultures:    Radiology:  CT Head  There is no acute intracranial mass-effect, hemorrhage, midline shift, or   abnormal extra-axial fluid collection.    The gray-white matter junction is grossly preserved. Mild patchy   hypodensities in the periventricular and subcortical white matter   compatible with microvascular ischemic changes.    The ventricular size and configuration is unchanged as compared to   2016. Basal cisterns are patent.     Mild mucosal thickening of the left maxillary sinus and near complete   opacification of the bilateral ethmoid air cells. The remaining paranasal   sinuses and mastoid air cells are clear. Calvarium is intact.   Hyperostosis frontalis interna.    IMPRESSION:     No acute intracranial bleeding, mass effect, or shift. Stable ventricular   size and configuration.    If symptoms persist, correlate with neurologic evaluation to determine if   further evaluation with MRI is warranted, if there are no   contraindications.    CXR: clear lungs         EKG:   NSR at 61 bpm Labs, imaging and EKG tracing personally reviewed    13.6   8.8   )-----------( 160      ( 15 Sep 2019 17:57 )             40.1       09-15    141  |  101  |  19  ----------------------------<  105<H>  4.1   |  25  |  1.02    Ca    9.4      15 Sep 2019 17:57    TPro  7.4  /  Alb  4.3  /  TBili  0.3  /  DBili  x   /  AST  16  /  ALT  13  /  AlkPhos  47  09-15        Urinalysis Basic - ( 15 Sep 2019 17:57 )    Color: Yellow / Appearance: Slightly Turbid / S.032 / pH: x  Gluc: x / Ketone: Small  / Bili: Negative / Urobili: 2 mg/dL   Blood: x / Protein: 30 mg/dL / Nitrite: Negative   Leuk Esterase: Negative / RBC: 8 /hpf / WBC 4 /HPF   Sq Epi: x / Non Sq Epi: 1 /hpf / Bacteria: Negative      Cultures:    Radiology:  CT Head  There is no acute intracranial mass-effect, hemorrhage, midline shift, or   abnormal extra-axial fluid collection.    The gray-white matter junction is grossly preserved. Mild patchy   hypodensities in the periventricular and subcortical white matter   compatible with microvascular ischemic changes.    The ventricular size and configuration is unchanged as compared to   2016. Basal cisterns are patent.     Mild mucosal thickening of the left maxillary sinus and near complete   opacification of the bilateral ethmoid air cells. The remaining paranasal   sinuses and mastoid air cells are clear. Calvarium is intact.   Hyperostosis frontalis interna.    IMPRESSION:     No acute intracranial bleeding, mass effect, or shift. Stable ventricular   size and configuration.    If symptoms persist, correlate with neurologic evaluation to determine if   further evaluation with MRI is warranted, if there are no   contraindications.    CXR: clear lungs         EKG:   NSR at 61 bpm

## 2019-09-17 LAB
ANION GAP SERPL CALC-SCNC: 10 MMOL/L — SIGNIFICANT CHANGE UP (ref 5–17)
BASOPHILS # BLD AUTO: 0.04 K/UL — SIGNIFICANT CHANGE UP (ref 0–0.2)
BASOPHILS NFR BLD AUTO: 0.9 % — SIGNIFICANT CHANGE UP (ref 0–2)
BUN SERPL-MCNC: 22 MG/DL — SIGNIFICANT CHANGE UP (ref 7–23)
CALCIUM SERPL-MCNC: 9.3 MG/DL — SIGNIFICANT CHANGE UP (ref 8.4–10.5)
CHLORIDE SERPL-SCNC: 102 MMOL/L — SIGNIFICANT CHANGE UP (ref 96–108)
CO2 SERPL-SCNC: 27 MMOL/L — SIGNIFICANT CHANGE UP (ref 22–31)
CREAT SERPL-MCNC: 1.09 MG/DL — SIGNIFICANT CHANGE UP (ref 0.5–1.3)
EOSINOPHIL # BLD AUTO: 0.12 K/UL — SIGNIFICANT CHANGE UP (ref 0–0.5)
EOSINOPHIL NFR BLD AUTO: 2.7 % — SIGNIFICANT CHANGE UP (ref 0–6)
GLUCOSE SERPL-MCNC: 87 MG/DL — SIGNIFICANT CHANGE UP (ref 70–99)
HCT VFR BLD CALC: 37.9 % — LOW (ref 39–50)
HCV AB S/CO SERPL IA: 0.24 S/CO — SIGNIFICANT CHANGE UP (ref 0–0.99)
HCV AB SERPL-IMP: SIGNIFICANT CHANGE UP
HGB BLD-MCNC: 12.2 G/DL — LOW (ref 13–17)
IMM GRANULOCYTES NFR BLD AUTO: 0.2 % — SIGNIFICANT CHANGE UP (ref 0–1.5)
LYMPHOCYTES # BLD AUTO: 2.24 K/UL — SIGNIFICANT CHANGE UP (ref 1–3.3)
LYMPHOCYTES # BLD AUTO: 50.8 % — HIGH (ref 13–44)
MAGNESIUM SERPL-MCNC: 2 MG/DL — SIGNIFICANT CHANGE UP (ref 1.6–2.6)
MCHC RBC-ENTMCNC: 30.3 PG — SIGNIFICANT CHANGE UP (ref 27–34)
MCHC RBC-ENTMCNC: 32.2 GM/DL — SIGNIFICANT CHANGE UP (ref 32–36)
MCV RBC AUTO: 94.3 FL — SIGNIFICANT CHANGE UP (ref 80–100)
MONOCYTES # BLD AUTO: 0.47 K/UL — SIGNIFICANT CHANGE UP (ref 0–0.9)
MONOCYTES NFR BLD AUTO: 10.7 % — SIGNIFICANT CHANGE UP (ref 2–14)
NEUTROPHILS # BLD AUTO: 1.53 K/UL — LOW (ref 1.8–7.4)
NEUTROPHILS NFR BLD AUTO: 34.7 % — LOW (ref 43–77)
PHOSPHATE SERPL-MCNC: 2.9 MG/DL — SIGNIFICANT CHANGE UP (ref 2.5–4.5)
PLATELET # BLD AUTO: 156 K/UL — SIGNIFICANT CHANGE UP (ref 150–400)
POTASSIUM SERPL-MCNC: 4.1 MMOL/L — SIGNIFICANT CHANGE UP (ref 3.5–5.3)
POTASSIUM SERPL-SCNC: 4.1 MMOL/L — SIGNIFICANT CHANGE UP (ref 3.5–5.3)
RBC # BLD: 4.02 M/UL — LOW (ref 4.2–5.8)
RBC # FLD: 13.1 % — SIGNIFICANT CHANGE UP (ref 10.3–14.5)
SODIUM SERPL-SCNC: 139 MMOL/L — SIGNIFICANT CHANGE UP (ref 135–145)
WBC # BLD: 4.41 K/UL — SIGNIFICANT CHANGE UP (ref 3.8–10.5)
WBC # FLD AUTO: 4.41 K/UL — SIGNIFICANT CHANGE UP (ref 3.8–10.5)

## 2019-09-17 PROCEDURE — 99232 SBSQ HOSP IP/OBS MODERATE 35: CPT | Mod: GC

## 2019-09-17 PROCEDURE — 70553 MRI BRAIN STEM W/O & W/DYE: CPT | Mod: 26

## 2019-09-17 PROCEDURE — 72156 MRI NECK SPINE W/O & W/DYE: CPT | Mod: 26

## 2019-09-17 RX ORDER — LANOLIN ALCOHOL/MO/W.PET/CERES
5 CREAM (GRAM) TOPICAL AT BEDTIME
Refills: 0 | Status: DISCONTINUED | OUTPATIENT
Start: 2019-09-17 | End: 2019-09-19

## 2019-09-17 RX ADMIN — Medication 20 MILLIGRAM(S): at 10:19

## 2019-09-17 RX ADMIN — Medication 650 MILLIGRAM(S): at 05:53

## 2019-09-17 RX ADMIN — Medication 1 TABLET(S): at 05:53

## 2019-09-17 RX ADMIN — Medication 0.5 MILLIGRAM(S): at 13:03

## 2019-09-17 RX ADMIN — HEPARIN SODIUM 5000 UNIT(S): 5000 INJECTION INTRAVENOUS; SUBCUTANEOUS at 17:31

## 2019-09-17 RX ADMIN — TAMSULOSIN HYDROCHLORIDE 0.4 MILLIGRAM(S): 0.4 CAPSULE ORAL at 21:20

## 2019-09-17 RX ADMIN — DIVALPROEX SODIUM 500 MILLIGRAM(S): 500 TABLET, DELAYED RELEASE ORAL at 00:09

## 2019-09-17 RX ADMIN — SIMVASTATIN 20 MILLIGRAM(S): 20 TABLET, FILM COATED ORAL at 21:19

## 2019-09-17 RX ADMIN — Medication 650 MILLIGRAM(S): at 14:43

## 2019-09-17 RX ADMIN — Medication 1 TABLET(S): at 17:31

## 2019-09-17 RX ADMIN — Medication 650 MILLIGRAM(S): at 06:45

## 2019-09-17 RX ADMIN — PANTOPRAZOLE SODIUM 40 MILLIGRAM(S): 20 TABLET, DELAYED RELEASE ORAL at 05:52

## 2019-09-17 RX ADMIN — Medication 20 MILLIGRAM(S): at 17:31

## 2019-09-17 RX ADMIN — Medication 5 MILLIGRAM(S): at 23:55

## 2019-09-17 RX ADMIN — DIVALPROEX SODIUM 750 MILLIGRAM(S): 500 TABLET, DELAYED RELEASE ORAL at 13:03

## 2019-09-17 RX ADMIN — RISPERIDONE 1.5 MILLIGRAM(S): 4 TABLET ORAL at 17:32

## 2019-09-17 RX ADMIN — RISPERIDONE 1.5 MILLIGRAM(S): 4 TABLET ORAL at 21:18

## 2019-09-17 RX ADMIN — Medication 650 MILLIGRAM(S): at 21:50

## 2019-09-17 RX ADMIN — FINASTERIDE 5 MILLIGRAM(S): 5 TABLET, FILM COATED ORAL at 13:21

## 2019-09-17 RX ADMIN — HEPARIN SODIUM 5000 UNIT(S): 5000 INJECTION INTRAVENOUS; SUBCUTANEOUS at 23:56

## 2019-09-17 RX ADMIN — DIVALPROEX SODIUM 500 MILLIGRAM(S): 500 TABLET, DELAYED RELEASE ORAL at 21:21

## 2019-09-17 RX ADMIN — Medication 75 MICROGRAM(S): at 05:52

## 2019-09-17 RX ADMIN — RISPERIDONE 1.5 MILLIGRAM(S): 4 TABLET ORAL at 00:09

## 2019-09-17 RX ADMIN — Medication 20 MILLIGRAM(S): at 13:03

## 2019-09-17 RX ADMIN — Medication 650 MILLIGRAM(S): at 21:18

## 2019-09-17 RX ADMIN — Medication 20 MILLIGRAM(S): at 00:08

## 2019-09-17 RX ADMIN — Medication 20 MILLIGRAM(S): at 21:20

## 2019-09-17 RX ADMIN — Medication 650 MILLIGRAM(S): at 13:01

## 2019-09-17 RX ADMIN — HEPARIN SODIUM 5000 UNIT(S): 5000 INJECTION INTRAVENOUS; SUBCUTANEOUS at 10:19

## 2019-09-17 NOTE — PHYSICAL THERAPY INITIAL EVALUATION ADULT - PERTINENT HX OF CURRENT PROBLEM, REHAB EVAL
60yo M w/ PMH of NPH, MR, & bipolar disorder presents with headaches and nausea. Reports urinary incontinence chronically. Physical exam significant for b/l LE weakness (L>R), left eye ptosis/painful eye movements, and diplopia. Increased difficulty with gait, worsening NPH appears less likely. Headache differential include cluster, migraine, orbital cellulitis.

## 2019-09-17 NOTE — OCCUPATIONAL THERAPY INITIAL EVALUATION ADULT - RANGE OF MOTION EXAMINATION, LOWER EXTREMITY
bilateral LE Passive ROM was WFL  (within functional limits)/Right LE Active ROM was WFL   (within functional limits)

## 2019-09-17 NOTE — PHYSICAL THERAPY INITIAL EVALUATION ADULT - GAIT DEVIATIONS NOTED, PT EVAL
decreased velocity of limb motion/decreased step length/decreased weight-shifting ability/decreased pardeep/increased time in double stance/decreased stride length/decreased swing-to-stance ratio

## 2019-09-17 NOTE — PHYSICAL THERAPY INITIAL EVALUATION ADULT - GENERAL OBSERVATIONS, REHAB EVAL
Pt tolerated 45min PT initial evaluation well. Pt rec'd in bed in NAD, Devaughn Lowe present throughout session for co-eval.

## 2019-09-17 NOTE — PHYSICAL THERAPY INITIAL EVALUATION ADULT - ADDITIONAL COMMENTS
Pt poor historian. Pt admit from [facility]. Pt requires assist x1 at baseline. Pt amb with RW prior to admit. Pt with difficulty amb relying on W/C for mobility.

## 2019-09-17 NOTE — PROGRESS NOTE ADULT - SUBJECTIVE AND OBJECTIVE BOX
TOM Martin, PGY 1  Pager [phone]    Patient is a 59y old  Male who presents with a chief complaint of Headache (16 Sep 2019 06:41)    SUBJECTIVE / OVERNIGHT EVENTS: No acute events overnight. Patient seen and examined at bedside this AM, A&Ox3. Patient continues to report 10/10 frontal headache as well as dizziness, says that "the room is spinning." Denies vomiting, but says he has not had much to eat or drink for fear that he will vomit. Patient appears comfortable, is pleasant and conversational throughout interview. Denies chest pain, shortness of breath, abdominal pain, diarrhea.     MEDICATIONS  (STANDING):  benztropine 0.5 milliGRAM(s) Oral daily  calcium carbonate   1250 mG (OsCal) 1 Tablet(s) Oral two times a day  dicyclomine 20 milliGRAM(s) Oral four times a day before meals  diVALproex  milliGRAM(s) Oral at bedtime  diVALproex  milliGRAM(s) Oral daily  finasteride 5 milliGRAM(s) Oral daily  heparin  Injectable 5000 Unit(s) SubCutaneous every 8 hours  influenza   Vaccine 0.5 milliLiter(s) IntraMuscular once  levothyroxine 75 MICROGram(s) Oral daily  pantoprazole    Tablet 40 milliGRAM(s) Oral before breakfast  risperiDONE   Tablet 1.5 milliGRAM(s) Oral two times a day  simvastatin 20 milliGRAM(s) Oral at bedtime  tamsulosin 0.4 milliGRAM(s) Oral at bedtime    MEDICATIONS  (PRN):  acetaminophen   Tablet .. 650 milliGRAM(s) Oral every 6 hours PRN Moderate Pain (4 - 6), Severe Pain (7 - 10)    Vital Signs Last 24 Hrs  T(C): 36.6 (17 Sep 2019 07:41), Max: 36.7 (16 Sep 2019 18:03)  T(F): 97.9 (17 Sep 2019 07:41), Max: 98.1 (16 Sep 2019 18:03)  HR: 63 (17 Sep 2019 07:41) (63 - 80)  BP: 111/75 (17 Sep 2019 07:41) (111/75 - 127/82)  RR: 18 (17 Sep 2019 07:41) (16 - 18)  SpO2: 95% (17 Sep 2019 07:41) (95% - 99%)    PHYSICAL EXAM  GENERAL: NAD, well-developed  NEURO: PERRLA, EOMI, 5/5 strength RLE and RUE, 4/5 LUE, refuses to move LLE, sensation intact, +left eye ptosis  HEAD:  Atraumatic, Normocephalic  EYES: conjunctiva and sclera clear  NECK: Supple, No JVD  CHEST/LUNG: Clear to auscultation bilaterally; No wheezes, rales or rhonchi  HEART: Regular rate and rhythm; No murmurs, rubs, or gallops  ABDOMEN: Soft, Nontender, Nondistended; Bowel sounds present, no masses.  EXTREMITIES:  2+ Peripheral Pulses, No clubbing, cyanosis, or edema  SKIN: Warm, dry, intact, no rashes or lesions  PSYCH: affect and behavior appropriate for setting    LABS:                        12.2   5.00  )-----------( 153      ( 16 Sep 2019 09:46 )             37.3       -    139  |  102  |  22  ----------------------------<  87  4.1   |  27  |  1.09    Ca    9.3      17 Sep 2019 07:13  Phos  2.9     -  Mg     2.0         TPro  7.4  /  Alb  4.3  /  TBili  0.3  /  DBili  x   /  AST  16  /  ALT  13  /  AlkPhos  47  09-15    Urinalysis Basic - ( 15 Sep 2019 17:57 )    Color: Yellow / Appearance: Slightly Turbid / S.032 / pH: x  Gluc: x / Ketone: Small  / Bili: Negative / Urobili: 2 mg/dL   Blood: x / Protein: 30 mg/dL / Nitrite: Negative   Leuk Esterase: Negative / RBC: 8 /hpf / WBC 4 /HPF   Sq Epi: x / Non Sq Epi: 1 /hpf / Bacteria: Negative    Culture Results:   <10,000 CFU/mL Normal Urogenital Jackelin (09-15 @ 23:16)    < from: CT Head No Cont (09.15.19 @ 18:37) >    IMPRESSION:     No acute intracranial bleeding, mass effect, or shift. Stable ventricular   size and configuration.    If symptoms persist, correlate with neurologic evaluation to determine if   further evaluation with MRI is warranted, if there are no   contraindications.

## 2019-09-17 NOTE — OCCUPATIONAL THERAPY INITIAL EVALUATION ADULT - PERTINENT HX OF CURRENT PROBLEM, REHAB EVAL
60yo M w/ PMH of NPH (s/p drainage twice), hypothyroidism, and bipolar disorder presents with headaches and nausea for the past week. He has been having urinary incontinence chronically and has not seen a neurologist since last year. Physical exam significant for b/l LE weakness (L>R), left eye ptosis/painful eye movements, and diplopia. Increased difficulty with gait, worsening NPH appears less likely. Headache differential include cluster, migraine, orbital cellulitis.

## 2019-09-17 NOTE — OCCUPATIONAL THERAPY INITIAL EVALUATION ADULT - ADDITIONAL COMMENTS
CT Head 9/15: No acute intracranial bleeding, mass effect, or shift. Stable ventricular size and configuration.

## 2019-09-17 NOTE — OCCUPATIONAL THERAPY INITIAL EVALUATION ADULT - STRENGTHENING, PT EVAL
Patient will increase strength in BUE/BLE by 1/2 grade in two weeks to facilitate increased ability to perform ADLs and functional mobility.

## 2019-09-17 NOTE — OCCUPATIONAL THERAPY INITIAL EVALUATION ADULT - RANGE OF MOTION EXAMINATION, UPPER EXTREMITY
L shoulder flex 60*, L elbow flex 100*, LUE distally WFL/Right UE Active ROM was WFL (within functional limits)

## 2019-09-18 LAB
ANION GAP SERPL CALC-SCNC: 17 MMOL/L — SIGNIFICANT CHANGE UP (ref 5–17)
BASOPHILS # BLD AUTO: 0.05 K/UL — SIGNIFICANT CHANGE UP (ref 0–0.2)
BASOPHILS NFR BLD AUTO: 1.2 % — SIGNIFICANT CHANGE UP (ref 0–2)
BUN SERPL-MCNC: 17 MG/DL — SIGNIFICANT CHANGE UP (ref 7–23)
CALCIUM SERPL-MCNC: 9.1 MG/DL — SIGNIFICANT CHANGE UP (ref 8.4–10.5)
CHLORIDE SERPL-SCNC: 102 MMOL/L — SIGNIFICANT CHANGE UP (ref 96–108)
CO2 SERPL-SCNC: 23 MMOL/L — SIGNIFICANT CHANGE UP (ref 22–31)
CREAT SERPL-MCNC: 0.85 MG/DL — SIGNIFICANT CHANGE UP (ref 0.5–1.3)
EOSINOPHIL # BLD AUTO: 0.14 K/UL — SIGNIFICANT CHANGE UP (ref 0–0.5)
EOSINOPHIL NFR BLD AUTO: 3.3 % — SIGNIFICANT CHANGE UP (ref 0–6)
GLUCOSE SERPL-MCNC: 103 MG/DL — HIGH (ref 70–99)
HCT VFR BLD CALC: 38.3 % — LOW (ref 39–50)
HGB BLD-MCNC: 12.8 G/DL — LOW (ref 13–17)
IMM GRANULOCYTES NFR BLD AUTO: 0.2 % — SIGNIFICANT CHANGE UP (ref 0–1.5)
LYMPHOCYTES # BLD AUTO: 1.87 K/UL — SIGNIFICANT CHANGE UP (ref 1–3.3)
LYMPHOCYTES # BLD AUTO: 44.4 % — HIGH (ref 13–44)
MAGNESIUM SERPL-MCNC: 2.1 MG/DL — SIGNIFICANT CHANGE UP (ref 1.6–2.6)
MCHC RBC-ENTMCNC: 30.9 PG — SIGNIFICANT CHANGE UP (ref 27–34)
MCHC RBC-ENTMCNC: 33.4 GM/DL — SIGNIFICANT CHANGE UP (ref 32–36)
MCV RBC AUTO: 92.5 FL — SIGNIFICANT CHANGE UP (ref 80–100)
MONOCYTES # BLD AUTO: 0.46 K/UL — SIGNIFICANT CHANGE UP (ref 0–0.9)
MONOCYTES NFR BLD AUTO: 10.9 % — SIGNIFICANT CHANGE UP (ref 2–14)
NEUTROPHILS # BLD AUTO: 1.68 K/UL — LOW (ref 1.8–7.4)
NEUTROPHILS NFR BLD AUTO: 40 % — LOW (ref 43–77)
PHOSPHATE SERPL-MCNC: 2.8 MG/DL — SIGNIFICANT CHANGE UP (ref 2.5–4.5)
PLATELET # BLD AUTO: 172 K/UL — SIGNIFICANT CHANGE UP (ref 150–400)
POTASSIUM SERPL-MCNC: 3.9 MMOL/L — SIGNIFICANT CHANGE UP (ref 3.5–5.3)
POTASSIUM SERPL-SCNC: 3.9 MMOL/L — SIGNIFICANT CHANGE UP (ref 3.5–5.3)
RBC # BLD: 4.14 M/UL — LOW (ref 4.2–5.8)
RBC # FLD: 13.2 % — SIGNIFICANT CHANGE UP (ref 10.3–14.5)
SODIUM SERPL-SCNC: 142 MMOL/L — SIGNIFICANT CHANGE UP (ref 135–145)
WBC # BLD: 4.21 K/UL — SIGNIFICANT CHANGE UP (ref 3.8–10.5)
WBC # FLD AUTO: 4.21 K/UL — SIGNIFICANT CHANGE UP (ref 3.8–10.5)

## 2019-09-18 RX ORDER — FLUTICASONE PROPIONATE 50 MCG
1 SPRAY, SUSPENSION NASAL
Qty: 0 | Refills: 0 | DISCHARGE
Start: 2019-09-18

## 2019-09-18 RX ORDER — ONDANSETRON 8 MG/1
4 TABLET, FILM COATED ORAL ONCE
Refills: 0 | Status: COMPLETED | OUTPATIENT
Start: 2019-09-18 | End: 2019-09-18

## 2019-09-18 RX ORDER — SENNA PLUS 8.6 MG/1
2 TABLET ORAL AT BEDTIME
Refills: 0 | Status: DISCONTINUED | OUTPATIENT
Start: 2019-09-18 | End: 2019-09-19

## 2019-09-18 RX ORDER — ACETAMINOPHEN 500 MG
2 TABLET ORAL
Qty: 0 | Refills: 0 | DISCHARGE
Start: 2019-09-18

## 2019-09-18 RX ORDER — MECLIZINE HCL 12.5 MG
12.5 TABLET ORAL ONCE
Refills: 0 | Status: COMPLETED | OUTPATIENT
Start: 2019-09-18 | End: 2019-09-18

## 2019-09-18 RX ORDER — DOCUSATE SODIUM 100 MG
100 CAPSULE ORAL DAILY
Refills: 0 | Status: DISCONTINUED | OUTPATIENT
Start: 2019-09-18 | End: 2019-09-19

## 2019-09-18 RX ORDER — FLUTICASONE PROPIONATE 50 MCG
1 SPRAY, SUSPENSION NASAL
Refills: 0 | Status: DISCONTINUED | OUTPATIENT
Start: 2019-09-18 | End: 2019-09-19

## 2019-09-18 RX ORDER — POLYETHYLENE GLYCOL 3350 17 G/17G
17 POWDER, FOR SOLUTION ORAL DAILY
Refills: 0 | Status: DISCONTINUED | OUTPATIENT
Start: 2019-09-18 | End: 2019-09-19

## 2019-09-18 RX ADMIN — DIVALPROEX SODIUM 750 MILLIGRAM(S): 500 TABLET, DELAYED RELEASE ORAL at 13:08

## 2019-09-18 RX ADMIN — Medication 20 MILLIGRAM(S): at 13:07

## 2019-09-18 RX ADMIN — RISPERIDONE 1.5 MILLIGRAM(S): 4 TABLET ORAL at 18:12

## 2019-09-18 RX ADMIN — RISPERIDONE 1.5 MILLIGRAM(S): 4 TABLET ORAL at 22:02

## 2019-09-18 RX ADMIN — PANTOPRAZOLE SODIUM 40 MILLIGRAM(S): 20 TABLET, DELAYED RELEASE ORAL at 05:40

## 2019-09-18 RX ADMIN — Medication 20 MILLIGRAM(S): at 22:01

## 2019-09-18 RX ADMIN — POLYETHYLENE GLYCOL 3350 17 GRAM(S): 17 POWDER, FOR SOLUTION ORAL at 13:08

## 2019-09-18 RX ADMIN — Medication 650 MILLIGRAM(S): at 23:00

## 2019-09-18 RX ADMIN — FINASTERIDE 5 MILLIGRAM(S): 5 TABLET, FILM COATED ORAL at 13:07

## 2019-09-18 RX ADMIN — TAMSULOSIN HYDROCHLORIDE 0.4 MILLIGRAM(S): 0.4 CAPSULE ORAL at 22:03

## 2019-09-18 RX ADMIN — Medication 650 MILLIGRAM(S): at 05:40

## 2019-09-18 RX ADMIN — Medication 1 TABLET(S): at 18:12

## 2019-09-18 RX ADMIN — Medication 2 DROP(S): at 13:08

## 2019-09-18 RX ADMIN — SIMVASTATIN 20 MILLIGRAM(S): 20 TABLET, FILM COATED ORAL at 22:03

## 2019-09-18 RX ADMIN — Medication 100 MILLIGRAM(S): at 13:07

## 2019-09-18 RX ADMIN — Medication 1 TABLET(S): at 05:39

## 2019-09-18 RX ADMIN — Medication 75 MICROGRAM(S): at 05:39

## 2019-09-18 RX ADMIN — SENNA PLUS 2 TABLET(S): 8.6 TABLET ORAL at 22:03

## 2019-09-18 RX ADMIN — ONDANSETRON 4 MILLIGRAM(S): 8 TABLET, FILM COATED ORAL at 05:51

## 2019-09-18 RX ADMIN — Medication 650 MILLIGRAM(S): at 04:38

## 2019-09-18 RX ADMIN — Medication 5 MILLIGRAM(S): at 22:02

## 2019-09-18 RX ADMIN — Medication 12.5 MILLIGRAM(S): at 13:07

## 2019-09-18 RX ADMIN — Medication 650 MILLIGRAM(S): at 22:05

## 2019-09-18 RX ADMIN — Medication 0.5 MILLIGRAM(S): at 13:08

## 2019-09-18 RX ADMIN — Medication 2 DROP(S): at 22:01

## 2019-09-18 RX ADMIN — DIVALPROEX SODIUM 500 MILLIGRAM(S): 500 TABLET, DELAYED RELEASE ORAL at 22:02

## 2019-09-18 RX ADMIN — HEPARIN SODIUM 5000 UNIT(S): 5000 INJECTION INTRAVENOUS; SUBCUTANEOUS at 18:12

## 2019-09-18 RX ADMIN — Medication 20 MILLIGRAM(S): at 18:12

## 2019-09-18 RX ADMIN — HEPARIN SODIUM 5000 UNIT(S): 5000 INJECTION INTRAVENOUS; SUBCUTANEOUS at 09:44

## 2019-09-18 RX ADMIN — Medication 20 MILLIGRAM(S): at 09:44

## 2019-09-18 RX ADMIN — Medication 1 SPRAY(S): at 18:12

## 2019-09-18 NOTE — CHART NOTE - NSCHARTNOTEFT_GEN_A_CORE
MRI brain and C spine reviewed with neuroradiology, grossly unchanged from prior. No need to repeat LP as minimal improvement from prior large volume tap and unlikely to be shunt placement candidate.  No further inpatient neurological work up needed at this time.    Follow up outpatient with own neurologist.  PT/OT    Discussed with Dr. London

## 2019-09-18 NOTE — PROGRESS NOTE ADULT - SUBJECTIVE AND OBJECTIVE BOX
TOM Martin, PGY 1  Pager [phone]    Patient is a 59y old  Male who presents with a chief complaint of Headache (16 Sep 2019 06:41)    SUBJECTIVE / OVERNIGHT EVENTS: No acute events overnight. Patient seen and examined at bedside this AM, A&Ox3. Patient states he still has a 10/10 headache and is dizzy. Patient appears comfortable, is pleasant and conversational throughout interview. Denies chest pain, shortness of breath, abdominal pain, diarrhea.     MEDICATIONS  (STANDING):  benztropine 0.5 milliGRAM(s) Oral daily  calcium carbonate   1250 mG (OsCal) 1 Tablet(s) Oral two times a day  dicyclomine 20 milliGRAM(s) Oral four times a day before meals  diVALproex  milliGRAM(s) Oral at bedtime  diVALproex  milliGRAM(s) Oral daily  finasteride 5 milliGRAM(s) Oral daily  heparin  Injectable 5000 Unit(s) SubCutaneous every 8 hours  influenza   Vaccine 0.5 milliLiter(s) IntraMuscular once  levothyroxine 75 MICROGram(s) Oral daily  melatonin 5 milliGRAM(s) Oral at bedtime  pantoprazole    Tablet 40 milliGRAM(s) Oral before breakfast  risperiDONE   Tablet 1.5 milliGRAM(s) Oral two times a day  simvastatin 20 milliGRAM(s) Oral at bedtime  tamsulosin 0.4 milliGRAM(s) Oral at bedtime    MEDICATIONS  (PRN):  acetaminophen   Tablet .. 650 milliGRAM(s) Oral every 6 hours PRN Moderate Pain (4 - 6), Severe Pain (7 - 10)    Vital Signs Last 24 Hrs  T(C): 36.6 (18 Sep 2019 07:09), Max: 36.7 (17 Sep 2019 16:56)  T(F): 97.9 (18 Sep 2019 07:09), Max: 98 (17 Sep 2019 16:56)  HR: 59 (18 Sep 2019 07:09) (59 - 68)  BP: 120/77 (18 Sep 2019 07:09) (98/62 - 120/77)  RR: 18 (18 Sep 2019 07:09) (18 - 18)  SpO2: 96% (18 Sep 2019 07:09) (96% - 97%)    PHYSICAL EXAM  GENERAL: NAD, well-developed  NEURO: PERRLA, EOMI, 5/5 strength RLE and RUE, 4/5 LUE, refuses to move LLE, sensation intact, +left eye ptosis  HEAD:  Atraumatic, Normocephalic  EYES: conjunctiva and sclera clear  CHEST/LUNG: Clear to auscultation bilaterally; No wheezes, rales or rhonchi  HEART: Regular rate and rhythm; No murmurs, rubs, or gallops  ABDOMEN: Soft, Nontender, Nondistended; Bowel sounds present, no masses.  EXTREMITIES:  2+ Peripheral Pulses, No clubbing, cyanosis, or edema  SKIN: Warm, dry, intact, no rashes or lesions  PSYCH: affect and behavior appropriate for setting    LABS:                        12.2   4.41  )-----------( 156      ( 17 Sep 2019 08:29 )             37.9           139  |  102  |  22  ----------------------------<  87  4.1   |  27  |  1.09    Ca    9.3      17 Sep 2019 07:13  Phos  2.9       Mg     2.0         Culture Results:   <10,000 CFU/mL Normal Urogenital Jackelin (09-15 @ 23:16)    Urinalysis Basic - ( 15 Sep 2019 17:57 )    Color: Yellow / Appearance: Slightly Turbid / S.032 / pH: x  Gluc: x / Ketone: Small  / Bili: Negative / Urobili: 2 mg/dL   Blood: x / Protein: 30 mg/dL / Nitrite: Negative   Leuk Esterase: Negative / RBC: 8 /hpf / WBC 4 /HPF   Sq Epi: x / Non Sq Epi: 1 /hpf / Bacteria: Negative    < from: CT Head No Cont (09.15.19 @ 18:37) >    IMPRESSION:     No acute intracranial bleeding, mass effect, or shift. Stable ventricular   size and configuration.    If symptoms persist, correlate with neurologic evaluation to determine if   further evaluation with MRI is warranted, if there are no   contraindications. TOM Martin, PGY 1  Pager [phone]    Patient is a 59y old  Male who presents with a chief complaint of Headache (16 Sep 2019 06:41)    SUBJECTIVE / OVERNIGHT EVENTS: No acute events overnight. Per night float, patient reported headache, nausea and trouble sleeping- was given tylenol, zofran and melatonin with good effect. Patient seen and examined at bedside this AM, A&Ox3. Patient states he still has a 10/10 headache and is dizzy. Patient appears comfortable, is pleasant and conversational throughout interview. Denies chest pain, shortness of breath, abdominal pain, diarrhea.     MEDICATIONS  (STANDING):  benztropine 0.5 milliGRAM(s) Oral daily  calcium carbonate   1250 mG (OsCal) 1 Tablet(s) Oral two times a day  dicyclomine 20 milliGRAM(s) Oral four times a day before meals  diVALproex  milliGRAM(s) Oral at bedtime  diVALproex  milliGRAM(s) Oral daily  finasteride 5 milliGRAM(s) Oral daily  heparin  Injectable 5000 Unit(s) SubCutaneous every 8 hours  influenza   Vaccine 0.5 milliLiter(s) IntraMuscular once  levothyroxine 75 MICROGram(s) Oral daily  melatonin 5 milliGRAM(s) Oral at bedtime  pantoprazole    Tablet 40 milliGRAM(s) Oral before breakfast  risperiDONE   Tablet 1.5 milliGRAM(s) Oral two times a day  simvastatin 20 milliGRAM(s) Oral at bedtime  tamsulosin 0.4 milliGRAM(s) Oral at bedtime    MEDICATIONS  (PRN):  acetaminophen   Tablet .. 650 milliGRAM(s) Oral every 6 hours PRN Moderate Pain (4 - 6), Severe Pain (7 - 10)    Vital Signs Last 24 Hrs  T(C): 36.6 (18 Sep 2019 07:09), Max: 36.7 (17 Sep 2019 16:56)  T(F): 97.9 (18 Sep 2019 07:09), Max: 98 (17 Sep 2019 16:56)  HR: 59 (18 Sep 2019 07:09) (59 - 68)  BP: 120/77 (18 Sep 2019 07:09) (98/62 - 120/77)  RR: 18 (18 Sep 2019 07:09) (18 - 18)  SpO2: 96% (18 Sep 2019 07:09) (96% - 97%)    PHYSICAL EXAM  GENERAL: NAD, well-developed  NEURO: PERRLA, EOMI, 5/5 strength RLE and RUE, 4/5 LUE, refuses to move LLE, sensation intact, +left eye ptosis  HEAD:  Atraumatic, Normocephalic  EYES: conjunctiva and sclera clear  CHEST/LUNG: Clear to auscultation bilaterally; No wheezes, rales or rhonchi  HEART: Regular rate and rhythm; No murmurs, rubs, or gallops  ABDOMEN: Soft, Nontender, Nondistended; Bowel sounds present, no masses.  EXTREMITIES:  2+ Peripheral Pulses, No clubbing, cyanosis, or edema  SKIN: Warm, dry, intact, no rashes or lesions  PSYCH: affect and behavior appropriate for setting    LABS:                        12.2   4.41  )-----------( 156      ( 17 Sep 2019 08:29 )             37.9       09-17    139  |  102  |  22  ----------------------------<  87  4.1   |  27  |  1.09    Ca    9.3      17 Sep 2019 07:13  Phos  2.9     -  Mg     2.0     -    Culture Results:   <10,000 CFU/mL Normal Urogenital Jackelin (09-15 @ 23:16)    Urinalysis Basic - ( 15 Sep 2019 17:57 )    Color: Yellow / Appearance: Slightly Turbid / S.032 / pH: x  Gluc: x / Ketone: Small  / Bili: Negative / Urobili: 2 mg/dL   Blood: x / Protein: 30 mg/dL / Nitrite: Negative   Leuk Esterase: Negative / RBC: 8 /hpf / WBC 4 /HPF   Sq Epi: x / Non Sq Epi: 1 /hpf / Bacteria: Negative    < from: CT Head No Cont (09.15.19 @ 18:37) >    IMPRESSION:     No acute intracranial bleeding, mass effect, or shift. Stable ventricular   size and configuration.    If symptoms persist, correlate with neurologic evaluation to determine if   further evaluation with MRI is warranted, if there are no   contraindications.

## 2019-09-19 ENCOUNTER — TRANSCRIPTION ENCOUNTER (OUTPATIENT)
Age: 59
End: 2019-09-19

## 2019-09-19 VITALS
RESPIRATION RATE: 18 BRPM | SYSTOLIC BLOOD PRESSURE: 105 MMHG | DIASTOLIC BLOOD PRESSURE: 70 MMHG | OXYGEN SATURATION: 96 % | TEMPERATURE: 98 F | HEART RATE: 57 BPM

## 2019-09-19 PROCEDURE — 97166 OT EVAL MOD COMPLEX 45 MIN: CPT

## 2019-09-19 PROCEDURE — 85027 COMPLETE CBC AUTOMATED: CPT

## 2019-09-19 PROCEDURE — 99285 EMERGENCY DEPT VISIT HI MDM: CPT | Mod: 25

## 2019-09-19 PROCEDURE — 96374 THER/PROPH/DIAG INJ IV PUSH: CPT | Mod: XU

## 2019-09-19 PROCEDURE — 94640 AIRWAY INHALATION TREATMENT: CPT

## 2019-09-19 PROCEDURE — 71045 X-RAY EXAM CHEST 1 VIEW: CPT

## 2019-09-19 PROCEDURE — 81001 URINALYSIS AUTO W/SCOPE: CPT

## 2019-09-19 PROCEDURE — 51798 US URINE CAPACITY MEASURE: CPT

## 2019-09-19 PROCEDURE — 80048 BASIC METABOLIC PNL TOTAL CA: CPT

## 2019-09-19 PROCEDURE — 80053 COMPREHEN METABOLIC PANEL: CPT

## 2019-09-19 PROCEDURE — A9585: CPT

## 2019-09-19 PROCEDURE — 84100 ASSAY OF PHOSPHORUS: CPT

## 2019-09-19 PROCEDURE — 70450 CT HEAD/BRAIN W/O DYE: CPT

## 2019-09-19 PROCEDURE — 83735 ASSAY OF MAGNESIUM: CPT

## 2019-09-19 PROCEDURE — 72156 MRI NECK SPINE W/O & W/DYE: CPT

## 2019-09-19 PROCEDURE — 86803 HEPATITIS C AB TEST: CPT

## 2019-09-19 PROCEDURE — 93005 ELECTROCARDIOGRAM TRACING: CPT

## 2019-09-19 PROCEDURE — 96361 HYDRATE IV INFUSION ADD-ON: CPT

## 2019-09-19 PROCEDURE — 97162 PT EVAL MOD COMPLEX 30 MIN: CPT

## 2019-09-19 PROCEDURE — 70553 MRI BRAIN STEM W/O & W/DYE: CPT

## 2019-09-19 PROCEDURE — 87086 URINE CULTURE/COLONY COUNT: CPT

## 2019-09-19 PROCEDURE — 96375 TX/PRO/DX INJ NEW DRUG ADDON: CPT | Mod: XU

## 2019-09-19 PROCEDURE — 99232 SBSQ HOSP IP/OBS MODERATE 35: CPT

## 2019-09-19 RX ADMIN — Medication 20 MILLIGRAM(S): at 06:33

## 2019-09-19 RX ADMIN — Medication 1 TABLET(S): at 06:32

## 2019-09-19 RX ADMIN — Medication 650 MILLIGRAM(S): at 06:33

## 2019-09-19 RX ADMIN — Medication 75 MICROGRAM(S): at 06:33

## 2019-09-19 RX ADMIN — Medication 2 DROP(S): at 06:32

## 2019-09-19 RX ADMIN — PANTOPRAZOLE SODIUM 40 MILLIGRAM(S): 20 TABLET, DELAYED RELEASE ORAL at 06:33

## 2019-09-19 RX ADMIN — Medication 1 SPRAY(S): at 06:32

## 2019-09-19 NOTE — PROGRESS NOTE ADULT - PROBLEM SELECTOR PLAN 1
Etiology unclear. DDx includes cluster, migraine, orbital cellulitis vs NPH.    Appreciate Neuro recs:   - MRI brain / contrast   - PT/OT evaluation  - Will consider LP after PT/OT
Etiology unclear. DDx includes cluster, migraine, orbital cellulitis vs NPH.    Appreciate Neuro recs:   - MRI brain / contrast   - PT/OT evaluation  - Will consider LP after PT/OT
Etiology unclear. DDx includes cluster, migraine, orbital cellulitis vs NPH.    Appreciate Neuro recs:   - MRI brain / contrast- awaiting official read   - PT/OT evaluation- recommend ANTONIO  - Will consider LP after PT/OT
Etiology unclear. DDx includes cluster, migraine, orbital cellulitis vs NPH.    Appreciate Neuro recs:   - MRI brain / contrast- no acute pathology  - PT/OT evaluation- recommend ANTONIO  - Per neuro- no indication for LP at this time

## 2019-09-19 NOTE — PHARMACOTHERAPY INTERVENTION NOTE - COMMENTS
Pharmacy intern discussed discharge medication name, dose, frequency, and side effects. Medication information handout provided from Med Essential Fact Sheet ([employer]).

## 2019-09-19 NOTE — DISCHARGE NOTE NURSING/CASE MANAGEMENT/SOCIAL WORK - PATIENT PORTAL LINK FT
You can access the FollowMyHealth Patient Portal offered by [facility] by registering at the following website: [email or web address]. By joining [facility] FollowMyHealth portal, you will also be able to view your health information using other applications (apps) compatible with our system.

## 2019-09-19 NOTE — PROGRESS NOTE ADULT - ATTENDING COMMENTS
d/c panning to ANTONIO, total d/c time 40 minutes
MRI unchanged from prior.  d/c planning to ANTONIO, total d/c time 45 minutes
await MRI studies, appreciate neuro eval.

## 2019-09-19 NOTE — PROGRESS NOTE ADULT - PROBLEM SELECTOR PLAN 2
Etiology unclear. Possibly old stroke vs progression of NPH. Appreciate Neuro recs. NPH progression seems less likely given CT findings    - MRI brain as above

## 2019-09-19 NOTE — PROGRESS NOTE ADULT - PROBLEM SELECTOR PLAN 4
CT demonstrates the ventricular size and configuration is unchanged as compared to 9/8/2016. Basal cisterns are patent.   - MRI as above

## 2019-09-19 NOTE — PROGRESS NOTE ADULT - SUBJECTIVE AND OBJECTIVE BOX
TOM Martin, PGY 1  Pager [phone]    Patient is a 59y old  Male who presents with a chief complaint of Headache (16 Sep 2019 06:41)    SUBJECTIVE / OVERNIGHT EVENTS: No acute events overnight. Patient seen and examined at bedside this AM, A&Ox3. Patient reports he is still experiencing headache and left-sided weakness. Also states that he has not been able to eat at all, but was observed eating lunch by this writer yesterday afternoon. Patient appears comfortable, in no acute distress, is pleasant and conversational throughout interview. Denies chest pain, shortness of breath, abdominal pain, vomiting, diarrhea. Patient is aware that discharge order was placed at 3pm yesterday and transport is set up for him to return to his residential facility at 10am today.    MEDICATIONS  (STANDING):  artificial tears (preservative free) Ophthalmic Solution 2 Drop(s) Both EYES three times a day  benztropine 0.5 milliGRAM(s) Oral daily  calcium carbonate   1250 mG (OsCal) 1 Tablet(s) Oral two times a day  dicyclomine 20 milliGRAM(s) Oral four times a day before meals  diVALproex  milliGRAM(s) Oral at bedtime  diVALproex  milliGRAM(s) Oral daily  docusate sodium 100 milliGRAM(s) Oral daily  finasteride 5 milliGRAM(s) Oral daily  fluticasone propionate 50 MICROgram(s)/spray Nasal Spray 1 Spray(s) Both Nostrils two times a day  heparin  Injectable 5000 Unit(s) SubCutaneous every 8 hours  influenza   Vaccine 0.5 milliLiter(s) IntraMuscular once  levothyroxine 75 MICROGram(s) Oral daily  melatonin 5 milliGRAM(s) Oral at bedtime  pantoprazole    Tablet 40 milliGRAM(s) Oral before breakfast  risperiDONE   Tablet 1.5 milliGRAM(s) Oral two times a day  senna 2 Tablet(s) Oral at bedtime  simvastatin 20 milliGRAM(s) Oral at bedtime  tamsulosin 0.4 milliGRAM(s) Oral at bedtime    MEDICATIONS  (PRN):  acetaminophen   Tablet .. 650 milliGRAM(s) Oral every 6 hours PRN Moderate Pain (4 - 6), Severe Pain (7 - 10)  polyethylene glycol 3350 17 Gram(s) Oral daily PRN Constipation    Vital Signs Last 24 Hrs  T(C): 36.6 (19 Sep 2019 01:40), Max: 36.6 (18 Sep 2019 16:32)  T(F): 97.9 (19 Sep 2019 01:40), Max: 97.9 (18 Sep 2019 16:32)  HR: 68 (19 Sep 2019 01:40) (66 - 68)  BP: 107/67 (19 Sep 2019 01:40) (106/74 - 107/67)  RR: 18 (19 Sep 2019 01:40) (18 - 18)  SpO2: 99% (19 Sep 2019 01:40) (95% - 99%)    PHYSICAL EXAM  GENERAL: NAD, well-developed  NEURO: PERRLA, EOMI, 5/5 strength RLE and RUE, 4/5 LUE, refuses to move LLE, sensation intact, +left eye ptosis  HEAD:  Atraumatic, Normocephalic  EYES: conjunctiva and sclera clear  CHEST/LUNG: Clear to auscultation bilaterally; No wheezes, rales or rhonchi  HEART: Regular rate and rhythm; No murmurs, rubs, or gallops  ABDOMEN: Soft, Nontender, Nondistended; Bowel sounds present, no masses.  EXTREMITIES:  2+ Peripheral Pulses, No clubbing, cyanosis, or edema  SKIN: Warm, dry, intact, no rashes or lesions  PSYCH: affect and behavior appropriate for setting    LABS:                        12.2   < from: MR Head w/wo IV Cont (19 @ 22:07) >  IMPRESSION:     No acute intracranial abnormality is noted. Stable ventricular size.    Paranasal sinus opacification as described suggesting sinusitis.    < end of copied text >  4.41  )-----------( 156      ( 17 Sep 2019 08:29 )             37.9           139  |  102  |  22  ----------------------------<  87  4.1   |  27  |  1.09    Ca    9.3      17 Sep 2019 07:13  Phos  2.9       Mg     2.0         Culture Results:   <10,000 CFU/mL Normal Urogenital Jackelin (09-15 @ 23:16)    Urinalysis Basic - ( 15 Sep 2019 17:57 )    Color: Yellow / Appearance: Slightly Turbid / S.032 / pH: x  Gluc: x / Ketone: Small  / Bili: Negative / Urobili: 2 mg/dL   Blood: x / Protein: 30 mg/dL / Nitrite: Negative   Leuk Esterase: Negative / RBC: 8 /hpf / WBC 4 /HPF   Sq Epi: x / Non Sq Epi: 1 /hpf / Bacteria: Negative    < from: CT Head No Cont (09.15.19 @ 18:37) >    IMPRESSION:     No acute intracranial bleeding, mass effect, or shift. Stable ventricular   size and configuration.    If symptoms persist, correlate with neurologic evaluation to determine if   further evaluation with MRI is warranted, if there are no   contraindications.      < from: MR Cervical Spine w/wo IV Cont (19 @ 22:08) >  IMPRESSION:    Multilevel lower cervical disc bulges with C6-C7 central disc protrusion   causing mild spinal canal stenosis without contacting the cord or causing   abnormal cord signal.    < from: MR Head w/wo IV Cont (19 @ 22:07) >  IMPRESSION:     No acute intracranial abnormality is noted. Stable ventricular size.    Paranasal sinus opacification as described suggesting sinusitis.    < end of copied text >    No abnormal enhancement.

## 2019-09-19 NOTE — PROGRESS NOTE ADULT - ASSESSMENT
59 year old man w/ hx NPH, hypothyroidism, bipolar disorder a/w headaches x 5 days and left eye ptosis and diplopia, now with continued headache, awaiting MRI.
59 year old man w/ hx NPH, hypothyroidism, bipolar disorder a/w headaches x 5 days and left eye ptosis and diplopia, now with continued headache, awaiting official MRI read.
59 year old man w/ hx NPH, hypothyroidism, bipolar disorder a/w headaches x 5 days and left eye ptosis and diplopia, now with continued headache.
59 year old man w/ hx NPH, hypothyroidism, bipolar disorder a/w headaches x 5 days and left eye ptosis and diplopia, no acute findings on CT and MRI, medically stable for follow up as outpatient and awaiting discharge.

## 2019-09-19 NOTE — PROGRESS NOTE ADULT - PROBLEM SELECTOR PLAN 3
Etiology unclear. Appreciate neuro and optho input. Lower concern for central etiology given monocular nature.    - MRI as above
Etiology unclear. Appreciate neuro and optho input. Lower concern for central etiology given monocular nature.    - F/u MRI

## 2019-09-19 NOTE — PROGRESS NOTE ADULT - PROBLEM SELECTOR PLAN 8
- Diet: Regular  - DVT: subq hep  - PT: recommend ANTONIO
- Diet: Regular  - DVT: subq hep  - PT: pending
- Diet: Regular  - DVT: subq hep  - PT: pending
- Diet: Regular  - DVT: subq hep  - PT: recommend ANTONIO

## 2019-09-26 ENCOUNTER — APPOINTMENT (OUTPATIENT)
Dept: NEUROLOGY | Facility: HOSPITAL | Age: 59
End: 2019-09-26

## 2019-10-10 ENCOUNTER — INPATIENT (INPATIENT)
Facility: HOSPITAL | Age: 59
LOS: 0 days | Discharge: HOME | DRG: 103 | End: 2019-10-11
Attending: SPECIALIST | Admitting: SPECIALIST
Payer: MEDICARE

## 2019-10-10 VITALS
TEMPERATURE: 99 F | DIASTOLIC BLOOD PRESSURE: 81 MMHG | SYSTOLIC BLOOD PRESSURE: 120 MMHG | OXYGEN SATURATION: 98 % | HEIGHT: 71 IN | RESPIRATION RATE: 17 BRPM | HEART RATE: 66 BPM | WEIGHT: 205.03 LBS

## 2019-10-10 LAB
ALBUMIN SERPL ELPH-MCNC: 4.2 G/DL — SIGNIFICANT CHANGE UP (ref 3.3–5)
ALP SERPL-CCNC: 47 U/L — SIGNIFICANT CHANGE UP (ref 40–120)
ALT FLD-CCNC: 16 U/L — SIGNIFICANT CHANGE UP (ref 10–45)
ANION GAP SERPL CALC-SCNC: 13 MMOL/L — SIGNIFICANT CHANGE UP (ref 5–17)
AST SERPL-CCNC: 13 U/L — SIGNIFICANT CHANGE UP (ref 10–40)
BASOPHILS # BLD AUTO: 0.05 K/UL — SIGNIFICANT CHANGE UP (ref 0–0.2)
BASOPHILS NFR BLD AUTO: 0.7 % — SIGNIFICANT CHANGE UP (ref 0–2)
BILIRUB SERPL-MCNC: 0.3 MG/DL — SIGNIFICANT CHANGE UP (ref 0.2–1.2)
BUN SERPL-MCNC: 18 MG/DL — SIGNIFICANT CHANGE UP (ref 7–23)
CALCIUM SERPL-MCNC: 9.1 MG/DL — SIGNIFICANT CHANGE UP (ref 8.4–10.5)
CHLORIDE SERPL-SCNC: 99 MMOL/L — SIGNIFICANT CHANGE UP (ref 96–108)
CO2 SERPL-SCNC: 27 MMOL/L — SIGNIFICANT CHANGE UP (ref 22–31)
CREAT SERPL-MCNC: 0.93 MG/DL — SIGNIFICANT CHANGE UP (ref 0.5–1.3)
EOSINOPHIL # BLD AUTO: 0.24 K/UL — SIGNIFICANT CHANGE UP (ref 0–0.5)
EOSINOPHIL NFR BLD AUTO: 3.2 % — SIGNIFICANT CHANGE UP (ref 0–6)
GLUCOSE SERPL-MCNC: 94 MG/DL — SIGNIFICANT CHANGE UP (ref 70–99)
HCT VFR BLD CALC: 40.7 % — SIGNIFICANT CHANGE UP (ref 39–50)
HGB BLD-MCNC: 13.8 G/DL — SIGNIFICANT CHANGE UP (ref 13–17)
IMM GRANULOCYTES NFR BLD AUTO: 0.3 % — SIGNIFICANT CHANGE UP (ref 0–1.5)
LYMPHOCYTES # BLD AUTO: 2.49 K/UL — SIGNIFICANT CHANGE UP (ref 1–3.3)
LYMPHOCYTES # BLD AUTO: 33.2 % — SIGNIFICANT CHANGE UP (ref 13–44)
MCHC RBC-ENTMCNC: 30.8 PG — SIGNIFICANT CHANGE UP (ref 27–34)
MCHC RBC-ENTMCNC: 33.9 GM/DL — SIGNIFICANT CHANGE UP (ref 32–36)
MCV RBC AUTO: 90.8 FL — SIGNIFICANT CHANGE UP (ref 80–100)
MONOCYTES # BLD AUTO: 0.57 K/UL — SIGNIFICANT CHANGE UP (ref 0–0.9)
MONOCYTES NFR BLD AUTO: 7.6 % — SIGNIFICANT CHANGE UP (ref 2–14)
NEUTROPHILS # BLD AUTO: 4.14 K/UL — SIGNIFICANT CHANGE UP (ref 1.8–7.4)
NEUTROPHILS NFR BLD AUTO: 55 % — SIGNIFICANT CHANGE UP (ref 43–77)
NRBC # BLD: 0 /100 WBCS — SIGNIFICANT CHANGE UP (ref 0–0)
PLATELET # BLD AUTO: 161 K/UL — SIGNIFICANT CHANGE UP (ref 150–400)
POTASSIUM SERPL-MCNC: 4.3 MMOL/L — SIGNIFICANT CHANGE UP (ref 3.5–5.3)
POTASSIUM SERPL-SCNC: 4.3 MMOL/L — SIGNIFICANT CHANGE UP (ref 3.5–5.3)
PROT SERPL-MCNC: 7.4 G/DL — SIGNIFICANT CHANGE UP (ref 6–8.3)
RBC # BLD: 4.48 M/UL — SIGNIFICANT CHANGE UP (ref 4.2–5.8)
RBC # FLD: 13.2 % — SIGNIFICANT CHANGE UP (ref 10.3–14.5)
SODIUM SERPL-SCNC: 139 MMOL/L — SIGNIFICANT CHANGE UP (ref 135–145)
VALPROATE SERPL-MCNC: 43 UG/ML — LOW (ref 50–100)
WBC # BLD: 7.51 K/UL — SIGNIFICANT CHANGE UP (ref 3.8–10.5)
WBC # FLD AUTO: 7.51 K/UL — SIGNIFICANT CHANGE UP (ref 3.8–10.5)

## 2019-10-10 PROCEDURE — 99285 EMERGENCY DEPT VISIT HI MDM: CPT

## 2019-10-10 PROCEDURE — 70450 CT HEAD/BRAIN W/O DYE: CPT | Mod: 26

## 2019-10-10 RX ORDER — METOCLOPRAMIDE HCL 10 MG
10 TABLET ORAL ONCE
Refills: 0 | Status: COMPLETED | OUTPATIENT
Start: 2019-10-10 | End: 2019-10-10

## 2019-10-10 RX ORDER — SODIUM CHLORIDE 9 MG/ML
1000 INJECTION INTRAMUSCULAR; INTRAVENOUS; SUBCUTANEOUS ONCE
Refills: 0 | Status: COMPLETED | OUTPATIENT
Start: 2019-10-10 | End: 2019-10-10

## 2019-10-10 RX ORDER — ACETAMINOPHEN 500 MG
650 TABLET ORAL ONCE
Refills: 0 | Status: COMPLETED | OUTPATIENT
Start: 2019-10-10 | End: 2019-10-10

## 2019-10-10 RX ADMIN — Medication 650 MILLIGRAM(S): at 23:00

## 2019-10-10 RX ADMIN — Medication 650 MILLIGRAM(S): at 21:23

## 2019-10-10 RX ADMIN — Medication 10 MILLIGRAM(S): at 23:06

## 2019-10-10 RX ADMIN — SODIUM CHLORIDE 1000 MILLILITER(S): 9 INJECTION INTRAMUSCULAR; INTRAVENOUS; SUBCUTANEOUS at 23:06

## 2019-10-10 NOTE — CONSULT NOTE ADULT - CONSULT REQUESTED DATE/TIME
----- Message from Anna Clark MA sent at 11/13/2018  4:31 PM CST -----  Patient states you advised her at appointment on 11/9 that you were sending Prozac to her pharmacy but they have nothing.  Please advise.  Thanks.   10-Oct-2019 21:45

## 2019-10-10 NOTE — ED PROVIDER NOTE - PMH
Bipolar Disorder    Hydronephrosis with Renal and Ureteral Calculous Obstruction  at last admission in [facility] ([place]) 2010  Hypothyroidism    MR (mental retardation)    Nephrolithiasis

## 2019-10-10 NOTE — ED PROVIDER NOTE - PHYSICAL EXAMINATION
CONSTITUTIONAL: NAD, awake, alert  HEAD: Normocephalic; atraumatic  EYES: EOMI, no nystagmus  ENMT: External appears normal, MMM  NECK: no tenderness, FROM  CARD: Normal Sl, S2; no audible murmurs  RESP: normal wob, lungs ctab  ABD: soft, non-distended; non-tender  MSK: no edema, normal ROM in all four extremities  SKIN: Warm, dry, no rashes  NEURO: aaox3, + LUE and LLE weakness compared to the R, L sided numbness

## 2019-10-10 NOTE — CONSULT NOTE ADULT - SUBJECTIVE AND OBJECTIVE BOX
HPI: 59 year old RH M with a PMH of NPH (s/p drainage twice), hypothyroidism, and bipolar disorder presents with 1 month of worsening headaches and right sided weakness.    He presented to the hospital in September 15, 2019 for similar complaints. At that time, he was having headaches, diplopia, urinary incontinence (chronic). He underwent MRI brain and cervical spine which were both negative. History obtained from    headaches for the past week. Patient states that since May 2019 he has become weaker. He was previously able to ambulate with a walker (for the past 5-6 years), but has been falling and requiring a wheelchair. He notes that his left lower extremity is weaker than his right and he endorses left hip pain. He notes the weakness particularly attempting to move from a sitting to a standing position (he falls back into the chair). He describes the headache as a 9/10, throbbing, bifrontal, periorbital, and occipital in location. He sometimes gets headaches, which are alleviated by tylenol but states that nothing has been helping his current headache. He endorses phonophobia and nausea. The nausea started two days ago. He has been having urinary incontinence chronically. He has not seen a neurologist since last year due to insurance issues. He also notes that since 9/11 or 9/12 he has been having a "puffy" left eye, blurry vision, left eye pain, and increased tearing bilaterally. He states that he wears bifocals with prism. He denies changes in hearing, vomiting, photophobia, change in speech, or dysphagia.     States he usually walks with a walker and now has a foot drop making it difficult to get around. Presents from rehab. Denies R sided weakness/numbness.    (Stroke only)  NIHSS:   MRS:   ICH:     REVIEW OF SYSTEMS  General:	  Skin/Breast:	  Ophthalmologic:  ENMT:	  Respiratory and Thorax:	  Cardiovascular:	  Gastrointestinal:	  Genitourinary:	  Musculoskeletal:	  Neurological:	  Psychiatric:	  Hematology/Lymphatics:	  Endocrine:	  Allergic/Immunologic:	    A 10-system ROS was performed and is negative except for those items noted above and/or in the HPI.    PAST MEDICAL & SURGICAL HISTORY:  MR (mental retardation)  Hypothyroidism  Hydronephrosis with Renal and Ureteral Calculous Obstruction: at last admission in [place] ([place]) 2010  Nephrolithiasis  Bipolar Disorder  S/P Appendectomy: 1997    FAMILY HISTORY:  Family history of hyperparathyroidism (Sibling)    SOCIAL HISTORY:   T/E/D:   Occupation:   Lives with:     MEDICATIONS (HOME):  Home Medications:  acetaminophen 325 mg oral tablet: 2 tab(s) orally every 6 hours, As needed, Moderate Pain (4 - 6), Severe Pain (7 - 10) (18 Sep 2019 14:57)  benztropine 0.5 mg oral tablet: 1 tab(s) orally once a day (10 Jul 2017 12:41)  cholecalciferol 50,000 intl units oral capsule: 1 cap(s) orally once a week on Sunday (25 May 2017 09:12)  Depakote 500 mg oral delayed release tablet: 2 tab(s) orally once a day (at bedtime) (16 Sep 2019 06:26)  dicyclomine 20 mg oral tablet: 1 tab(s) orally 2 times a day (before meals) (10 Jul 2017 12:41)  divalproex sodium 250 mg oral delayed release tablet: 3 tab(s) orally once a day at 9:00 AM (16 Sep 2019 06:25)  docusate sodium 100 mg oral capsule: 1 cap(s) orally 2 times a day (10 Jul 2017 12:41)  fluticasone 50 mcg/inh nasal spray: 1 spray(s) nasal 2 times a day (18 Sep 2019 13:16)  levothyroxine 75 mcg (0.075 mg) oral tablet: 1 tab(s) orally once a day (10 Jul 2017 12:41)  loratadine 10 mg oral tablet: 1 tab(s) orally once a day (16 Sep 2019 06:32)  Melatonin 3 mg oral tablet: 2 tab(s) orally once (at bedtime) (16 Sep 2019 06:27)  multivitamin: 1 tab(s) orally once a day (25 May 2017 09:12)  ocular lubricant ophthalmic solution: 2 drop(s) to each affected eye 3 times a day (18 Sep 2019 13:16)  omeprazole 40 mg oral delayed release capsule: 1 cap(s) orally 2 times a day (25 May 2017 09:12)  Oyster Shell Calcium 500 (1250 mg calcium carbonate) oral tablet: 1 tab(s) orally 2 times a day (16 Sep 2019 06:29)  polyethylene glycol 3350 oral powder for reconstitution: 17 gram(s) orally once a day (10 Jul 2017 12:41)  Proscar 5 mg oral tablet: 1 tab(s) orally once a day (16 Sep 2019 06:31)  risperiDONE 0.5 mg oral tablet: 3 tab(s) orally 2 times a day (10 Jul 2017 12:41)  senna oral tablet: 2 tab(s) orally once a day (at bedtime), As needed, Constipation (10 Jul 2017 12:41)  simvastatin 20 mg oral tablet: 1 tab(s) orally once a day (at bedtime) (10 Jul 2017 12:41)  tamsulosin 0.4 mg oral capsule: 1 cap(s) orally once a day (at bedtime) (10 Jul 2017 12:41)    MEDICATIONS  (STANDING):    MEDICATIONS  (PRN):    ALLERGIES/INTOLERANCES:  Allergies  No Known Allergies    Intolerances    VITALS & EXAMINATION:  Vital Signs Last 24 Hrs  T(C): 37.1 (10 Oct 2019 17:56), Max: 37.1 (10 Oct 2019 17:56)  T(F): 98.7 (10 Oct 2019 17:56), Max: 98.7 (10 Oct 2019 17:56)  HR: 66 (10 Oct 2019 17:56) (66 - 66)  BP: 120/81 (10 Oct 2019 17:56) (120/81 - 120/81)  BP(mean): --  RR: 17 (10 Oct 2019 17:56) (17 - 17)  SpO2: 98% (10 Oct 2019 17:56) (98% - 98%)    General:  Constitutional: Obese Male, appears stated age, in no apparent distress including pain  Head: Normocephalic & atraumatic.  ENT: Patent ear canals, intact TM, mucus membranes moist & pink, neck supple, no lymphadenopathy.   Respiratory: Patent airway. All lung fields are clear to auscultation bilaterally.  Extremities: No cyanosis, clubbing, or edema.  Skin: No rashes, bruising, or discoloration.    Cardiovascular (>2): RRR no murmurs. Carotid pulsations symmetric, no bruits. Normal capillary beds refill, 1-2 seconds or less.     Neurological (>12):  MS: Awake, alert, oriented to person, place, situation, time. Normal affect. Follows all commands.    Language: Speech is clear, fluent with good repetition & comprehension (able to name objects___)    CNs: PERRLA (R = 3mm, L = 3mm). VFF. EOMI no nystagmus, no diplopia. V1-3 intact to LT/pinprick, well developed masseter muscles b/l. No facial asymmetry b/l, full eye closure strength b/l. Hearing grossly normal (rubbing fingers) b/l. Symmetric palate elevation in midline. Gag reflex deferred. Head turning & shoulder shrug intact b/l. Tongue midline, normal movements, no atrophy.    Fundoscopic: pale w/ sharp discs margins No vascular changes.      Motor: Normal muscle bulk & tone. No noticeable tremor or seizure. No pronator drift.              Deltoid	Biceps	Triceps	Wrist	Finger ABd	   R	5	5	5	5	5		5 	  L	5	5	5	5	5		5    	H-Flex	H-Ext	H-ABd	H-ADd	K-Flex	K-Ext	D-Flex	P-Flex  R	5	5	5	5	5	5	5	5 	   L	5	5	5	5	5	5	5	5	     Sensation: Intact to LT/PP/Temp/Vibration/Position b/l throughout.     Cortical: Extinction on DSS (neglect): none    Reflexes:              Biceps(C5)       BR(C6)     Triceps(C7)               Patellar(L4)    Achilles(S1)    Plantar Resp  R	2	          2	             2		        2		    2		Down   L	2	          2	             2		        2		    2		Down     Coordination: intact rapid-alt movements. No dysmetria to FTN/HTS    Gait: Normal Romberg. No postural instability. Normal stance and tandem gait.     LABORATORY:  CBC                       13.8   7.51  )-----------( 161      ( 10 Oct 2019 21:21 )             40.7     Chem       LFTs   Coagulopathy   Lipid Panel   A1c   Cardiac enzymes     U/A   CSF  Immunological  Other    STUDIES & IMAGING:  Studies (EKG, EEG, EMG, etc):     Radiology (XR, CT, MR, U/S, TTE/BEVERLEY): HPI: 59 year old RH M with a PMH of NPH (s/p drainage twice), hypothyroidism, and bipolar disorder presents with 1 month of worsening headaches and right sided weakness.    He presented to the hospital in September 15, 2019 for similar complaints. At that time, he was having headaches, diplopia, urinary incontinence (chronic). He underwent MRI brain and cervical spine which were both negative. History obtained from daughter and record as well.    He has had prior LP in 2014 which did not show improvement in NPH symptoms.     headaches for the past week. Patient states that since May 2019 he has become weaker. He was previously able to ambulate with a walker (for the past 5-6 years), but has been falling and requiring a wheelchair. He notes that his left lower extremity is weaker than his right and he endorses left hip pain. He notes the weakness particularly attempting to move from a sitting to a standing position (he falls back into the chair). He describes the headache as a 9/10, throbbing, bifrontal, periorbital, and occipital in location. He sometimes gets headaches, which are alleviated by tylenol but states that nothing has been helping his current headache. He endorses phonophobia and nausea. The nausea started two days ago. He has been having urinary incontinence chronically. He has not seen a neurologist since last year due to insurance issues. He also notes that since 9/11 or 9/12 he has been having a "puffy" left eye, blurry vision, left eye pain, and increased tearing bilaterally. He states that he wears bifocals with prism. He denies changes in hearing, vomiting, photophobia, change in speech, or dysphagia.     States he usually walks with a walker and now has a foot drop making it difficult to get around. Presents from rehab. Denies R sided weakness/numbness.    (Stroke only)  NIHSS:   MRS:   ICH:     REVIEW OF SYSTEMS  General:	  Skin/Breast:	  Ophthalmologic:  ENMT:	  Respiratory and Thorax:	  Cardiovascular:	  Gastrointestinal:	  Genitourinary:	  Musculoskeletal:	  Neurological:	  Psychiatric:	  Hematology/Lymphatics:	  Endocrine:	  Allergic/Immunologic:	    A 10-system ROS was performed and is negative except for those items noted above and/or in the HPI.    PAST MEDICAL & SURGICAL HISTORY:  MR (mental retardation)  Hypothyroidism  Hydronephrosis with Renal and Ureteral Calculous Obstruction: at last admission in [place] ([place]) 2010  Nephrolithiasis  Bipolar Disorder  S/P Appendectomy: 1997    FAMILY HISTORY:  Family history of hyperparathyroidism (Sibling)    SOCIAL HISTORY:   T/E/D:   Occupation:   Lives with:     MEDICATIONS (HOME):  Home Medications:  acetaminophen 325 mg oral tablet: 2 tab(s) orally every 6 hours, As needed, Moderate Pain (4 - 6), Severe Pain (7 - 10) (18 Sep 2019 14:57)  benztropine 0.5 mg oral tablet: 1 tab(s) orally once a day (10 Jul 2017 12:41)  cholecalciferol 50,000 intl units oral capsule: 1 cap(s) orally once a week on Sunday (25 May 2017 09:12)  Depakote 500 mg oral delayed release tablet: 2 tab(s) orally once a day (at bedtime) (16 Sep 2019 06:26)  dicyclomine 20 mg oral tablet: 1 tab(s) orally 2 times a day (before meals) (10 Jul 2017 12:41)  divalproex sodium 250 mg oral delayed release tablet: 3 tab(s) orally once a day at 9:00 AM (16 Sep 2019 06:25)  docusate sodium 100 mg oral capsule: 1 cap(s) orally 2 times a day (10 Jul 2017 12:41)  fluticasone 50 mcg/inh nasal spray: 1 spray(s) nasal 2 times a day (18 Sep 2019 13:16)  levothyroxine 75 mcg (0.075 mg) oral tablet: 1 tab(s) orally once a day (10 Jul 2017 12:41)  loratadine 10 mg oral tablet: 1 tab(s) orally once a day (16 Sep 2019 06:32)  Melatonin 3 mg oral tablet: 2 tab(s) orally once (at bedtime) (16 Sep 2019 06:27)  multivitamin: 1 tab(s) orally once a day (25 May 2017 09:12)  ocular lubricant ophthalmic solution: 2 drop(s) to each affected eye 3 times a day (18 Sep 2019 13:16)  omeprazole 40 mg oral delayed release capsule: 1 cap(s) orally 2 times a day (25 May 2017 09:12)  Oyster Shell Calcium 500 (1250 mg calcium carbonate) oral tablet: 1 tab(s) orally 2 times a day (16 Sep 2019 06:29)  polyethylene glycol 3350 oral powder for reconstitution: 17 gram(s) orally once a day (10 Jul 2017 12:41)  Proscar 5 mg oral tablet: 1 tab(s) orally once a day (16 Sep 2019 06:31)  risperiDONE 0.5 mg oral tablet: 3 tab(s) orally 2 times a day (10 Jul 2017 12:41)  senna oral tablet: 2 tab(s) orally once a day (at bedtime), As needed, Constipation (10 Jul 2017 12:41)  simvastatin 20 mg oral tablet: 1 tab(s) orally once a day (at bedtime) (10 Jul 2017 12:41)  tamsulosin 0.4 mg oral capsule: 1 cap(s) orally once a day (at bedtime) (10 Jul 2017 12:41)    MEDICATIONS  (STANDING):    MEDICATIONS  (PRN):    ALLERGIES/INTOLERANCES:  Allergies  No Known Allergies    Intolerances    VITALS & EXAMINATION:  Vital Signs Last 24 Hrs  T(C): 37.1 (10 Oct 2019 17:56), Max: 37.1 (10 Oct 2019 17:56)  T(F): 98.7 (10 Oct 2019 17:56), Max: 98.7 (10 Oct 2019 17:56)  HR: 66 (10 Oct 2019 17:56) (66 - 66)  BP: 120/81 (10 Oct 2019 17:56) (120/81 - 120/81)  BP(mean): --  RR: 17 (10 Oct 2019 17:56) (17 - 17)  SpO2: 98% (10 Oct 2019 17:56) (98% - 98%)    General:  Constitutional: Obese Male, appears stated age, in no apparent distress including pain  Head: Normocephalic & atraumatic.  ENT: Patent ear canals, intact TM, mucus membranes moist & pink, neck supple, no lymphadenopathy.   Respiratory: Patent airway. All lung fields are clear to auscultation bilaterally.  Extremities: No cyanosis, clubbing, or edema.  Skin: No rashes, bruising, or discoloration.    Cardiovascular (>2): RRR no murmurs. Carotid pulsations symmetric, no bruits. Normal capillary beds refill, 1-2 seconds or less.     Neurological (>12):  MS: Awake, alert, oriented to person, place, situation, time. Normal affect. Follows all commands.    Language: Speech is clear, fluent with good repetition & comprehension (able to name objects___)    CNs: PERRLA (R = 3mm, L = 3mm). VFF. EOMI no nystagmus, no diplopia. V1-3 intact to LT/pinprick, well developed masseter muscles b/l. No facial asymmetry b/l, full eye closure strength b/l. Hearing grossly normal (rubbing fingers) b/l. Symmetric palate elevation in midline. Gag reflex deferred. Head turning & shoulder shrug intact b/l. Tongue midline, normal movements, no atrophy.    Fundoscopic: pale w/ sharp discs margins No vascular changes.      Motor: Normal muscle bulk & tone. No noticeable tremor or seizure. No pronator drift.              Deltoid	Biceps	Triceps	Wrist	Finger ABd	   R	5	5	5	5	5		5 	  L	5	5	5	5	5		5    	H-Flex	H-Ext	H-ABd	H-ADd	K-Flex	K-Ext	D-Flex	P-Flex  R	5	5	5	5	5	5	5	5 	   L	5	5	5	5	5	5	5	5	     Sensation: Intact to LT/PP/Temp/Vibration/Position b/l throughout.     Cortical: Extinction on DSS (neglect): none    Reflexes:              Biceps(C5)       BR(C6)     Triceps(C7)               Patellar(L4)    Achilles(S1)    Plantar Resp  R	2	          2	             2		        2		    2		Down   L	2	          2	             2		        2		    2		Down     Coordination: intact rapid-alt movements. No dysmetria to FTN/HTS    Gait: Normal Romberg. No postural instability. Normal stance and tandem gait.     LABORATORY:  CBC                       13.8   7.51  )-----------( 161      ( 10 Oct 2019 21:21 )             40.7     Chem       LFTs   Coagulopathy   Lipid Panel   A1c   Cardiac enzymes     U/A   CSF  Immunological  Other    STUDIES & IMAGING:  Studies (EKG, EEG, EMG, etc):     Radiology (XR, CT, MR, U/S, TTE/BEVERLEY): HPI: 59 year old RH M with a PMH of NPH (s/p drainage twice), hypothyroidism, and bipolar disorder presents with 1 month of worsening headaches and right sided weakness. He presented to the hospital in September 15, 2019 for similar complaints. At that time, he was having headaches, diplopia, urinary incontinence (chronic), as well as left sided weakness. He underwent MRI brain and cervical spine which were both negative. History obtained from daughter and record as well.    He reports that he has been having gait instability for the past 10 years. He was found to have dilated ventricles and underwent 2 prior LPs without improvement in his gait. Patient states that since May 2019 he has become weaker. He was previously able to ambulate with a walker (for the past 5-6 years), but has been falling and requiring a wheelchair. He notes that his left lower extremity is weaker than his right and he endorses left hip pain. He notes the weakness particularly attempting to move from a sitting to a standing position (he falls back into the chair). He describes the headache as a 9/10, throbbing, bifrontal, periorbital, and occipital in location. States he usually walks with a walker and now has a foot drop making it difficult to get around. Presents from rehab.     He states that his symptoms are the same as they were in September 2019, except that his left sided weakness is getting worse. He reports significant weakness from the left knee downward. He also reports having stiffness in the left side, which is getting worse. Denies dysphagia, but endorses dysarthria. Also reports numbness and tingling in the left side. He has bipolar d/o and has been on /1000, benztropine 0.5 mg, and risperdal 1.5 mg BID.    REVIEW OF SYSTEMS    A 10-system ROS was performed and is negative except for those items noted above and/or in the HPI.    PAST MEDICAL & SURGICAL HISTORY:  MR (mental retardation)  Hypothyroidism  Hydronephrosis with Renal and Ureteral Calculous Obstruction: at last admission in [place] ([place]) 2010  Nephrolithiasis  Bipolar Disorder  S/P Appendectomy: 1997    FAMILY HISTORY:  Family history of hyperparathyroidism (Sibling)    SOCIAL HISTORY:   T/E/D:   Occupation:   Lives with:     MEDICATIONS (HOME):  Home Medications:  acetaminophen 325 mg oral tablet: 2 tab(s) orally every 6 hours, As needed, Moderate Pain (4 - 6), Severe Pain (7 - 10) (18 Sep 2019 14:57)  benztropine 0.5 mg oral tablet: 1 tab(s) orally once a day (10 Jul 2017 12:41)  cholecalciferol 50,000 intl units oral capsule: 1 cap(s) orally once a week on Sunday (25 May 2017 09:12)  Depakote 500 mg oral delayed release tablet: 2 tab(s) orally once a day (at bedtime) (16 Sep 2019 06:26)  dicyclomine 20 mg oral tablet: 1 tab(s) orally 2 times a day (before meals) (10 Jul 2017 12:41)  divalproex sodium 250 mg oral delayed release tablet: 3 tab(s) orally once a day at 9:00 AM (16 Sep 2019 06:25)  docusate sodium 100 mg oral capsule: 1 cap(s) orally 2 times a day (10 Jul 2017 12:41)  fluticasone 50 mcg/inh nasal spray: 1 spray(s) nasal 2 times a day (18 Sep 2019 13:16)  levothyroxine 75 mcg (0.075 mg) oral tablet: 1 tab(s) orally once a day (10 Jul 2017 12:41)  loratadine 10 mg oral tablet: 1 tab(s) orally once a day (16 Sep 2019 06:32)  Melatonin 3 mg oral tablet: 2 tab(s) orally once (at bedtime) (16 Sep 2019 06:27)  multivitamin: 1 tab(s) orally once a day (25 May 2017 09:12)  ocular lubricant ophthalmic solution: 2 drop(s) to each affected eye 3 times a day (18 Sep 2019 13:16)  omeprazole 40 mg oral delayed release capsule: 1 cap(s) orally 2 times a day (25 May 2017 09:12)  Oyster Shell Calcium 500 (1250 mg calcium carbonate) oral tablet: 1 tab(s) orally 2 times a day (16 Sep 2019 06:29)  polyethylene glycol 3350 oral powder for reconstitution: 17 gram(s) orally once a day (10 Jul 2017 12:41)  Proscar 5 mg oral tablet: 1 tab(s) orally once a day (16 Sep 2019 06:31)  risperiDONE 0.5 mg oral tablet: 3 tab(s) orally 2 times a day (10 Jul 2017 12:41)  senna oral tablet: 2 tab(s) orally once a day (at bedtime), As needed, Constipation (10 Jul 2017 12:41)  simvastatin 20 mg oral tablet: 1 tab(s) orally once a day (at bedtime) (10 Jul 2017 12:41)  tamsulosin 0.4 mg oral capsule: 1 cap(s) orally once a day (at bedtime) (10 Jul 2017 12:41)    MEDICATIONS  (STANDING):    MEDICATIONS  (PRN):    ALLERGIES/INTOLERANCES:  Allergies  No Known Allergies    Intolerances    VITALS & EXAMINATION:  Vital Signs Last 24 Hrs  T(C): 37.1 (10 Oct 2019 17:56), Max: 37.1 (10 Oct 2019 17:56)  T(F): 98.7 (10 Oct 2019 17:56), Max: 98.7 (10 Oct 2019 17:56)  HR: 66 (10 Oct 2019 17:56) (66 - 66)  BP: 120/81 (10 Oct 2019 17:56) (120/81 - 120/81)  BP(mean): --  RR: 17 (10 Oct 2019 17:56) (17 - 17)  SpO2: 98% (10 Oct 2019 17:56) (98% - 98%)    General:  Constitutional: Male, appears stated age, in no apparent distress     Neurological (>12):  MS: Awake, alert, oriented to person, place, situation, time. Normal affect. Follows all commands.    Language: mild to moderate possibly spastic dysarthria, speech is fluent    CNs: EOMI (pain with left eye movement), ?left temporal visual field defect. V1-3 intact to LT/pinprick, No facial asymmetry b/l, Tongue midline, normal movements, no atrophy.    Fundoscopic: pale w/ sharp discs margins No vascular changes.      Motor: increased tone in the LUE and LLE compared to the right. bradykinesia in LUE. no thenar atrophy noted.                Deltoid	Biceps	Triceps	   R	5	5	5	5		  L	5	5	5	5	  	H-Flex	K-Flex	K-Ext	D-Flex	P-Flex  R	5	5	5	5	5 	   L	3	3	2	3	3	     Sensation: decreased sensation to light touch on the left UE and LLE.     Reflexes:              Biceps(C5)       BR(C6)          Patellar(L4)    Achilles(S1)    Plantar Resp  R	3	          3             	3		    2		Down   L	3	          3	             	3		    2		Down     +crossed adductors in patellars  Faye negative    Coordination: No dysmetria to FTN. no HTS dysmetria with right leg. L leg too weak to participate in dysmetria testing.    Gait: wheelchair bound    LABORATORY:  CBC                       13.8   7.51  )-----------( 161      ( 10 Oct 2019 21:21 )             40.7     Chem       LFTs   Coagulopathy   Lipid Panel   A1c   Cardiac enzymes     U/A   CSF  Immunological  Other    STUDIES & IMAGING:  Studies (EKG, EEG, EMG, etc):     Radiology (XR, CT, MR, U/S, TTE/BEVERLEY): HPI: 59 year old RH M with a PMH of NPH (s/p drainage twice), hypothyroidism, and bipolar disorder presents with 1 month of worsening headaches and right sided weakness. He presented to the hospital in September 15, 2019 for similar complaints. At that time, he was having headaches, diplopia, urinary incontinence (chronic), as well as left sided weakness. He underwent MRI brain and cervical spine which were both negative. History obtained from daughter and record as well.    He reports that he has been having gait instability for the past 10 years. He was found to have dilated ventricles and underwent 2 prior LPs without improvement in his gait. Patient states that since May 2019 he has become weaker. He was previously able to ambulate with a walker (for the past 5-6 years), but has been falling and requiring a wheelchair. He notes that his left lower extremity is weaker than his right and he endorses left hip pain. He notes the weakness particularly attempting to move from a sitting to a standing position (he falls back into the chair). He describes the headache as a 9/10, throbbing, bifrontal, periorbital, and occipital in location. States he usually walks with a walker and now has a foot drop making it difficult to get around. Presents from rehab. Also is complaining of a left foot drop.    He states that his symptoms are the same as they were in September 2019, except that his left sided weakness is getting worse. He reports significant weakness from the left knee downward. He also reports having stiffness in the left side, which is getting worse. Denies dysphagia, but endorses dysarthria. Also reports numbness and tingling in the left side. He has bipolar d/o and has been on /1000, benztropine 0.5 mg, and risperdal 1.5 mg BID.    REVIEW OF SYSTEMS    A 10-system ROS was performed and is negative except for those items noted above and/or in the HPI.    PAST MEDICAL & SURGICAL HISTORY:  MR (mental retardation)  Hypothyroidism  Hydronephrosis with Renal and Ureteral Calculous Obstruction: at last admission in [place] ([place]) 2010  Nephrolithiasis  Bipolar Disorder  S/P Appendectomy: 1997    FAMILY HISTORY:  Family history of hyperparathyroidism (Sibling)    SOCIAL HISTORY:   T/E/D:   Occupation:   Lives with:     MEDICATIONS (HOME):  Home Medications:  acetaminophen 325 mg oral tablet: 2 tab(s) orally every 6 hours, As needed, Moderate Pain (4 - 6), Severe Pain (7 - 10) (18 Sep 2019 14:57)  benztropine 0.5 mg oral tablet: 1 tab(s) orally once a day (10 Jul 2017 12:41)  cholecalciferol 50,000 intl units oral capsule: 1 cap(s) orally once a week on Sunday (25 May 2017 09:12)  Depakote 500 mg oral delayed release tablet: 2 tab(s) orally once a day (at bedtime) (16 Sep 2019 06:26)  dicyclomine 20 mg oral tablet: 1 tab(s) orally 2 times a day (before meals) (10 Jul 2017 12:41)  divalproex sodium 250 mg oral delayed release tablet: 3 tab(s) orally once a day at 9:00 AM (16 Sep 2019 06:25)  docusate sodium 100 mg oral capsule: 1 cap(s) orally 2 times a day (10 Jul 2017 12:41)  fluticasone 50 mcg/inh nasal spray: 1 spray(s) nasal 2 times a day (18 Sep 2019 13:16)  levothyroxine 75 mcg (0.075 mg) oral tablet: 1 tab(s) orally once a day (10 Jul 2017 12:41)  loratadine 10 mg oral tablet: 1 tab(s) orally once a day (16 Sep 2019 06:32)  Melatonin 3 mg oral tablet: 2 tab(s) orally once (at bedtime) (16 Sep 2019 06:27)  multivitamin: 1 tab(s) orally once a day (25 May 2017 09:12)  ocular lubricant ophthalmic solution: 2 drop(s) to each affected eye 3 times a day (18 Sep 2019 13:16)  omeprazole 40 mg oral delayed release capsule: 1 cap(s) orally 2 times a day (25 May 2017 09:12)  Oyster Shell Calcium 500 (1250 mg calcium carbonate) oral tablet: 1 tab(s) orally 2 times a day (16 Sep 2019 06:29)  polyethylene glycol 3350 oral powder for reconstitution: 17 gram(s) orally once a day (10 Jul 2017 12:41)  Proscar 5 mg oral tablet: 1 tab(s) orally once a day (16 Sep 2019 06:31)  risperiDONE 0.5 mg oral tablet: 3 tab(s) orally 2 times a day (10 Jul 2017 12:41)  senna oral tablet: 2 tab(s) orally once a day (at bedtime), As needed, Constipation (10 Jul 2017 12:41)  simvastatin 20 mg oral tablet: 1 tab(s) orally once a day (at bedtime) (10 Jul 2017 12:41)  tamsulosin 0.4 mg oral capsule: 1 cap(s) orally once a day (at bedtime) (10 Jul 2017 12:41)    MEDICATIONS  (STANDING):    MEDICATIONS  (PRN):    ALLERGIES/INTOLERANCES:  Allergies  No Known Allergies    Intolerances    VITALS & EXAMINATION:  Vital Signs Last 24 Hrs  T(C): 37.1 (10 Oct 2019 17:56), Max: 37.1 (10 Oct 2019 17:56)  T(F): 98.7 (10 Oct 2019 17:56), Max: 98.7 (10 Oct 2019 17:56)  HR: 66 (10 Oct 2019 17:56) (66 - 66)  BP: 120/81 (10 Oct 2019 17:56) (120/81 - 120/81)  BP(mean): --  RR: 17 (10 Oct 2019 17:56) (17 - 17)  SpO2: 98% (10 Oct 2019 17:56) (98% - 98%)    General:  Constitutional: Male, appears stated age, in no apparent distress     Neurological (>12):  MS: Awake, alert, oriented to person, place, situation, time. Normal affect. Follows all commands.    Language: mild to moderate possibly spastic dysarthria, speech is fluent    CNs: EOMI (pain with left eye movement), ?left temporal visual field defect. V1-3 intact to LT/pinprick, No facial asymmetry b/l, Tongue midline, normal movements, no atrophy. tongue not spastic.    Fundoscopic: pale w/ sharp discs margins No vascular changes.      Motor: increased tone in the LUE and LLE compared to the right. bradykinesia in LUE. no thenar atrophy noted.                Deltoid	Biceps	Triceps	   R	5	5	5	5		  L	5	5	5	5	  	H-Flex	K-Flex	K-Ext	D-Flex	P-Flex  R	5	5	5	5	5 	   L	3	3	2	3	3	     Sensation: decreased sensation to light touch on the left UE and LLE.     Reflexes:              Biceps(C5)       BR(C6)          Patellar(L4)    Achilles(S1)    Plantar Resp  R	3	          3             	3		    2		Down   L	3	          3	             	3		    2		Down     +crossed adductors in patellars  Faye negative    Coordination: No dysmetria to FTN. no HTS dysmetria with right leg. L leg too weak to participate in dysmetria testing.    Gait: wheelchair bound    LABORATORY:  CBC                       13.8   7.51  )-----------( 161      ( 10 Oct 2019 21:21 )             40.7     Chem       LFTs   Coagulopathy   Lipid Panel   A1c   Cardiac enzymes     U/A   CSF  Immunological  Other    STUDIES & IMAGING:  Studies (EKG, EEG, EMG, etc):     Radiology (XR, CT, MR, U/S, TTE/BEVERLEY):

## 2019-10-10 NOTE — CONSULT NOTE ADULT - ASSESSMENT
Impression: Unclear etiology of current symptoms. Differential diagnosis includes NPH, Parkinson's disease, and motor neuron disease (PLS). Could also be cord compression, given his hyperreflexia, though his most recent MRI cervical spine was negative for cord compression. Should consider repeating scans.    Plan: Impression: Unclear etiology of current symptoms. Differential diagnosis includes NPH, Parkinson's disease, and motor neuron disease (PLS). Could also be cord compression, given his hyperreflexia, though his most recent MRI cervical spine was negative for cord compression.     Plan:  -MRI cervical spine w/o contrast

## 2019-10-10 NOTE — ED ADULT NURSE NOTE - OBJECTIVE STATEMENT
59y male presents to ED complaining of a headache. Pt BIBEMS from [facility]. Pt has a hx of MR and hydrocephalus, pt states he called 911 himself because they did not call for him. Pt states over the last 3 weeks since he was d/irma from [facility], he has had a worsening headache. PT has slurred speech and slow to answer questions. PT states he's also developed LLE weakness, worsening foot drop. Pt states he usually ambulates with a walker but has not been able to. Pt has weakness on LLE unable to hold against gravity. Pt breathing spontaneous and unlabored with lungs clear to auscultation bilaterally. Pt skin is warm, dry and intact with no edema present. Pt abdomen soft, nontender, nondistended with bowel sounds present in all 4 quadrants.

## 2019-10-10 NOTE — ED PROVIDER NOTE - CLINICAL SUMMARY MEDICAL DECISION MAKING FREE TEXT BOX
59M w/ NPH, worsening headache and neuro symptoms, will obtain head CT, labs, neuro cs, reassess 59M w/ NPH, worsening headache and neuro symptoms, will obtain head CT, labs, neuro cs, reassess    BOBBY Solis MD: Pt is a 58 y/o male with PMH Bipolar Disorder on risperidone, MR, NPH who p/w c/o 1 month of worsening headache and RUE and RLE weakness. Pt states he usually walks with a walker and now has a foot drop making it difficult to get around. Presents from rehab. Denies R sided weakness/numbness. Denies recent trauma. Also c/o HA currently. Pt denies: chest pain, SOB, cough, fevers, chills, pleuritic chest pain, abdominal pain, n/v/d, back pain, neck pain, neck stiffness, visual changes, dizziness, lightheadedness, leg pain/swelling, recent travel, recent trauma, recent immobilization, dysuria, hematuria, rash. Ddx includes, however, is not limited to: hydrocephalus, ICH, other neurologic d/o, metabolic d/o, other. Plan: CTH, basic labs, infectious w/u, neuro consult, likely TBA for further w/u

## 2019-10-10 NOTE — CONSULT NOTE ADULT - ATTENDING COMMENTS
History as outlined above  Patient has been in and out of rehab for several years with chronic left side weakness per his sister for at least 10 years. He presents from rehab with worsening of his left side symptoms and migraine, which he has a history of and takes Fioricet prn. He was given Toradol in the ED with minimal benefit. He takes risperdal and VPA for bipolar disorder.  He feels tingling down his left side and was last admitted 1 month ago for similar symptoms with brain MRI showing no acute pathology. However, there was CBL and cervical cord atrophy present. C/spine MRI negative for cord impingement.    On exam  Psychomotor retardation  Hypometric saccades. Nasal dysarthria (chronic).   Right side movements are full with normal strength  Left side movements limited by effort. moves UE more than LE. Able to maintain tone in his left leg when knee is flexed and foot is on the bed. + hoovers. There is no increased tone  DTRs 2+ b/l      Impression: left side sensorimotor symptoms of unclear etiology. His exam has not changed from a month ago based on review of his chart. He warrants an EMG which can be done outpatient. Once his HA is better controlled, he can return to rehab with close f/u with outpatient neurology at 611.

## 2019-10-10 NOTE — ED ADULT NURSE NOTE - NSIMPLEMENTINTERV_GEN_ALL_ED
Implemented All Universal Safety Interventions:  [place] to call system. Call bell, personal items and telephone within reach. Instruct patient to call for assistance. Room bathroom lighting operational. Non-slip footwear when patient is off stretcher. Physically safe environment: no spills, clutter or unnecessary equipment. Stretcher in lowest position, wheels locked, appropriate side rails in place.

## 2019-10-10 NOTE — ED PROVIDER NOTE - OBJECTIVE STATEMENT
59M Bipolar Disorder on risperidone, MR, NPH p/w 1 month of worsening headache and RUE and RLE weakness. States he usually walks with a walker and now has a foot drop making it difficult to get around. Presents from rehab. Denies R sided weakness/numbness.

## 2019-10-10 NOTE — ED PROVIDER NOTE - ATTENDING CONTRIBUTION TO CARE
I saw and evaluated patient with resident. I discussed H+P and MDM with resident. I agree with the statements made by the resident unless otherwise noted.
36.5

## 2019-10-11 ENCOUNTER — TRANSCRIPTION ENCOUNTER (OUTPATIENT)
Age: 59
End: 2019-10-11

## 2019-10-11 VITALS
TEMPERATURE: 98 F | DIASTOLIC BLOOD PRESSURE: 63 MMHG | OXYGEN SATURATION: 97 % | RESPIRATION RATE: 18 BRPM | HEART RATE: 69 BPM | SYSTOLIC BLOOD PRESSURE: 111 MMHG

## 2019-10-11 DIAGNOSIS — R53.1 WEAKNESS: ICD-10-CM

## 2019-10-11 LAB
ALBUMIN SERPL ELPH-MCNC: 3.6 G/DL — SIGNIFICANT CHANGE UP (ref 3.3–5)
ALP SERPL-CCNC: 38 U/L — LOW (ref 40–120)
ALT FLD-CCNC: 13 U/L — SIGNIFICANT CHANGE UP (ref 10–45)
ANION GAP SERPL CALC-SCNC: 8 MMOL/L — SIGNIFICANT CHANGE UP (ref 5–17)
AST SERPL-CCNC: 11 U/L — SIGNIFICANT CHANGE UP (ref 10–40)
BILIRUB SERPL-MCNC: 0.2 MG/DL — SIGNIFICANT CHANGE UP (ref 0.2–1.2)
BUN SERPL-MCNC: 19 MG/DL — SIGNIFICANT CHANGE UP (ref 7–23)
CALCIUM SERPL-MCNC: 8.1 MG/DL — LOW (ref 8.4–10.5)
CHLORIDE SERPL-SCNC: 105 MMOL/L — SIGNIFICANT CHANGE UP (ref 96–108)
CHOLEST SERPL-MCNC: 163 MG/DL — SIGNIFICANT CHANGE UP (ref 10–199)
CK SERPL-CCNC: 113 U/L — SIGNIFICANT CHANGE UP (ref 30–200)
CO2 SERPL-SCNC: 25 MMOL/L — SIGNIFICANT CHANGE UP (ref 22–31)
CREAT SERPL-MCNC: 0.88 MG/DL — SIGNIFICANT CHANGE UP (ref 0.5–1.3)
GLUCOSE SERPL-MCNC: 149 MG/DL — HIGH (ref 70–99)
HDLC SERPL-MCNC: 36 MG/DL — LOW
LIPID PNL WITH DIRECT LDL SERPL: 82 MG/DL — SIGNIFICANT CHANGE UP
POTASSIUM SERPL-MCNC: 3.5 MMOL/L — SIGNIFICANT CHANGE UP (ref 3.5–5.3)
POTASSIUM SERPL-SCNC: 3.5 MMOL/L — SIGNIFICANT CHANGE UP (ref 3.5–5.3)
PROT SERPL-MCNC: 6.1 G/DL — SIGNIFICANT CHANGE UP (ref 6–8.3)
SODIUM SERPL-SCNC: 138 MMOL/L — SIGNIFICANT CHANGE UP (ref 135–145)
TOTAL CHOLESTEROL/HDL RATIO MEASUREMENT: 4.5 RATIO — SIGNIFICANT CHANGE UP (ref 3.4–9.6)
TRIGL SERPL-MCNC: 225 MG/DL — HIGH (ref 10–149)

## 2019-10-11 PROCEDURE — 99222 1ST HOSP IP/OBS MODERATE 55: CPT

## 2019-10-11 RX ORDER — PANTOPRAZOLE SODIUM 20 MG/1
40 TABLET, DELAYED RELEASE ORAL
Refills: 0 | Status: DISCONTINUED | OUTPATIENT
Start: 2019-10-11 | End: 2019-10-11

## 2019-10-11 RX ORDER — POLYETHYLENE GLYCOL 3350 17 G/17G
17 POWDER, FOR SOLUTION ORAL DAILY
Refills: 0 | Status: DISCONTINUED | OUTPATIENT
Start: 2019-10-11 | End: 2019-10-11

## 2019-10-11 RX ORDER — SIMVASTATIN 20 MG/1
20 TABLET, FILM COATED ORAL AT BEDTIME
Refills: 0 | Status: DISCONTINUED | OUTPATIENT
Start: 2019-10-11 | End: 2019-10-11

## 2019-10-11 RX ORDER — LORATADINE 10 MG/1
10 TABLET ORAL DAILY
Refills: 0 | Status: DISCONTINUED | OUTPATIENT
Start: 2019-10-11 | End: 2019-10-11

## 2019-10-11 RX ORDER — RISPERIDONE 4 MG/1
1.5 TABLET ORAL
Refills: 0 | Status: DISCONTINUED | OUTPATIENT
Start: 2019-10-11 | End: 2019-10-11

## 2019-10-11 RX ORDER — DIVALPROEX SODIUM 500 MG/1
750 TABLET, DELAYED RELEASE ORAL
Refills: 0 | Status: DISCONTINUED | OUTPATIENT
Start: 2019-10-11 | End: 2019-10-11

## 2019-10-11 RX ORDER — SODIUM CHLORIDE 9 MG/ML
1000 INJECTION INTRAMUSCULAR; INTRAVENOUS; SUBCUTANEOUS ONCE
Refills: 0 | Status: COMPLETED | OUTPATIENT
Start: 2019-10-11 | End: 2019-10-11

## 2019-10-11 RX ORDER — ACETAMINOPHEN 500 MG
650 TABLET ORAL ONCE
Refills: 0 | Status: COMPLETED | OUTPATIENT
Start: 2019-10-11 | End: 2019-10-11

## 2019-10-11 RX ORDER — TAMSULOSIN HYDROCHLORIDE 0.4 MG/1
0.4 CAPSULE ORAL AT BEDTIME
Refills: 0 | Status: DISCONTINUED | OUTPATIENT
Start: 2019-10-11 | End: 2019-10-11

## 2019-10-11 RX ORDER — PANTOPRAZOLE SODIUM 20 MG/1
1 TABLET, DELAYED RELEASE ORAL
Qty: 0 | Refills: 0 | DISCHARGE
Start: 2019-10-11

## 2019-10-11 RX ORDER — ENOXAPARIN SODIUM 100 MG/ML
40 INJECTION SUBCUTANEOUS DAILY
Refills: 0 | Status: DISCONTINUED | OUTPATIENT
Start: 2019-10-11 | End: 2019-10-11

## 2019-10-11 RX ORDER — KETOROLAC TROMETHAMINE 30 MG/ML
30 SYRINGE (ML) INJECTION ONCE
Refills: 0 | Status: DISCONTINUED | OUTPATIENT
Start: 2019-10-11 | End: 2019-10-11

## 2019-10-11 RX ORDER — BENZTROPINE MESYLATE 1 MG
0.5 TABLET ORAL DAILY
Refills: 0 | Status: DISCONTINUED | OUTPATIENT
Start: 2019-10-11 | End: 2019-10-11

## 2019-10-11 RX ORDER — LANOLIN ALCOHOL/MO/W.PET/CERES
3 CREAM (GRAM) TOPICAL AT BEDTIME
Refills: 0 | Status: DISCONTINUED | OUTPATIENT
Start: 2019-10-11 | End: 2019-10-11

## 2019-10-11 RX ORDER — METOCLOPRAMIDE HCL 10 MG
10 TABLET ORAL ONCE
Refills: 0 | Status: COMPLETED | OUTPATIENT
Start: 2019-10-11 | End: 2019-10-11

## 2019-10-11 RX ORDER — DIVALPROEX SODIUM 500 MG/1
1000 TABLET, DELAYED RELEASE ORAL AT BEDTIME
Refills: 0 | Status: DISCONTINUED | OUTPATIENT
Start: 2019-10-11 | End: 2019-10-11

## 2019-10-11 RX ORDER — INFLUENZA VIRUS VACCINE 15; 15; 15; 15 UG/.5ML; UG/.5ML; UG/.5ML; UG/.5ML
0.5 SUSPENSION INTRAMUSCULAR ONCE
Refills: 0 | Status: DISCONTINUED | OUTPATIENT
Start: 2019-10-11 | End: 2019-10-11

## 2019-10-11 RX ORDER — LEVOTHYROXINE SODIUM 125 MCG
75 TABLET ORAL DAILY
Refills: 0 | Status: DISCONTINUED | OUTPATIENT
Start: 2019-10-11 | End: 2019-10-11

## 2019-10-11 RX ORDER — DOCUSATE SODIUM 100 MG
100 CAPSULE ORAL
Refills: 0 | Status: DISCONTINUED | OUTPATIENT
Start: 2019-10-11 | End: 2019-10-11

## 2019-10-11 RX ORDER — FINASTERIDE 5 MG/1
5 TABLET, FILM COATED ORAL DAILY
Refills: 0 | Status: DISCONTINUED | OUTPATIENT
Start: 2019-10-11 | End: 2019-10-11

## 2019-10-11 RX ORDER — FLUTICASONE PROPIONATE 50 MCG
1 SPRAY, SUSPENSION NASAL
Refills: 0 | Status: DISCONTINUED | OUTPATIENT
Start: 2019-10-11 | End: 2019-10-11

## 2019-10-11 RX ORDER — IBUPROFEN 200 MG
600 TABLET ORAL ONCE
Refills: 0 | Status: COMPLETED | OUTPATIENT
Start: 2019-10-11 | End: 2019-10-11

## 2019-10-11 RX ORDER — CALCIUM CARBONATE 500(1250)
1 TABLET ORAL
Refills: 0 | Status: DISCONTINUED | OUTPATIENT
Start: 2019-10-11 | End: 2019-10-11

## 2019-10-11 RX ADMIN — Medication 100 MILLIGRAM(S): at 15:18

## 2019-10-11 RX ADMIN — DIVALPROEX SODIUM 750 MILLIGRAM(S): 500 TABLET, DELAYED RELEASE ORAL at 09:43

## 2019-10-11 RX ADMIN — Medication 2 DROP(S): at 13:12

## 2019-10-11 RX ADMIN — Medication 30 MILLIGRAM(S): at 09:46

## 2019-10-11 RX ADMIN — Medication 10 MILLIGRAM(S): at 04:30

## 2019-10-11 RX ADMIN — Medication 1 SPRAY(S): at 06:31

## 2019-10-11 RX ADMIN — Medication 75 MICROGRAM(S): at 06:24

## 2019-10-11 RX ADMIN — Medication 600 MILLIGRAM(S): at 01:01

## 2019-10-11 RX ADMIN — Medication 650 MILLIGRAM(S): at 04:30

## 2019-10-11 RX ADMIN — Medication 1 TABLET(S): at 06:31

## 2019-10-11 RX ADMIN — PANTOPRAZOLE SODIUM 40 MILLIGRAM(S): 20 TABLET, DELAYED RELEASE ORAL at 06:24

## 2019-10-11 RX ADMIN — SODIUM CHLORIDE 1000 MILLILITER(S): 9 INJECTION INTRAMUSCULAR; INTRAVENOUS; SUBCUTANEOUS at 01:01

## 2019-10-11 RX ADMIN — ENOXAPARIN SODIUM 40 MILLIGRAM(S): 100 INJECTION SUBCUTANEOUS at 13:11

## 2019-10-11 RX ADMIN — Medication 100 MILLIGRAM(S): at 06:24

## 2019-10-11 RX ADMIN — LORATADINE 10 MILLIGRAM(S): 10 TABLET ORAL at 13:11

## 2019-10-11 RX ADMIN — Medication 20 MILLIGRAM(S): at 06:31

## 2019-10-11 RX ADMIN — Medication 30 MILLIGRAM(S): at 10:00

## 2019-10-11 RX ADMIN — Medication 1 TABLET(S): at 13:11

## 2019-10-11 RX ADMIN — RISPERIDONE 1.5 MILLIGRAM(S): 4 TABLET ORAL at 06:25

## 2019-10-11 RX ADMIN — Medication 0.5 MILLIGRAM(S): at 13:11

## 2019-10-11 RX ADMIN — FINASTERIDE 5 MILLIGRAM(S): 5 TABLET, FILM COATED ORAL at 13:11

## 2019-10-11 RX ADMIN — Medication 600 MILLIGRAM(S): at 03:00

## 2019-10-11 NOTE — DISCHARGE NOTE PROVIDER - HOSPITAL COURSE
59 year old RH M with a PMH of NPH (s/p drainage twice), hypothyroidism, and bipolar disorder presents with 1 month of worsening headaches and right sided weakness. He presented to the hospital in September 15, 2019 for similar complaints. At that time, he was having headaches, diplopia, urinary incontinence (chronic), as well as left sided weakness. He underwent MRI brain and cervical spine which were both negative. History obtained from daughter and record as well.        He reports that he has been having gait instability for the past 10 years. He was found to have dilated ventricles and underwent 2 prior LPs without improvement in his gait. Patient states that since May 2019 he has become weaker. He was previously able to ambulate with a walker (for the past 5-6 years), but has been falling and requiring a wheelchair. He notes that his left lower extremity is weaker than his right and he endorses left hip pain. States he usually walks with a walker and now has a foot drop making it difficult to get around. Presents from rehab.         He states that his symptoms are the same as they were in September 2019. Patient complaining of migraine which usually is accompanied by left sided numbness.

## 2019-10-11 NOTE — H&P ADULT - ASSESSMENT
Impression: Unclear etiology of current symptoms. Differential diagnosis includes NPH, Parkinson's disease, and motor neuron disease (PLS). Could also be cord compression, given his hyperreflexia, though less likely given that his most recent MRI cervical spine was negative for cord compression. Motor neuron disease is high on differential given progressive symptoms for months, left foot drop (which is seen in MND), hyperreflexia, and dysarthria (bulbar finding).    Plan:  -admit to neurology  -consider MRI cervical spine w/o contrast Impression: Unclear etiology of current symptoms. Differential diagnosis includes NPH, Parkinson's disease, and motor neuron disease (PLS). Could also be cord compression, given his hyperreflexia, though less likely given that his most recent MRI cervical spine was negative for cord compression. Motor neuron disease is high on differential given progressive symptoms for months, left foot drop (which is seen in MND), hyperreflexia, and dysarthria (bulbar finding).    Plan:  -admit to neurology  -consider MRI cervical spine w/o contrast  -send CK, TSH  -PT/OT

## 2019-10-11 NOTE — DISCHARGE NOTE PROVIDER - NSDCFUSCHEDAPPT_GEN_ALL_CORE_FT
CHERIE BARRY ; 11/12/2019 ; [facility] [address] CHERIE BARRY ; 11/12/2019 ; [facility] [address]

## 2019-10-11 NOTE — DISCHARGE NOTE NURSING/CASE MANAGEMENT/SOCIAL WORK - PATIENT PORTAL LINK FT
You can access the FollowMyHealth Patient Portal offered by [facility] by registering at the following website: [email or web address]. By joining [facility]’s FollowMyHealth portal, you will also be able to view your health information using other applications (apps) compatible with our system.

## 2019-10-11 NOTE — H&P ADULT - HISTORY OF PRESENT ILLNESS
59 year old RH M with a PMH of NPH (s/p drainage twice), hypothyroidism, and bipolar disorder presents with 1 month of worsening headaches and right sided weakness. He presented to the hospital in September 15, 2019 for similar complaints. At that time, he was having headaches, diplopia, urinary incontinence (chronic), as well as left sided weakness. He underwent MRI brain and cervical spine which were both negative. History obtained from daughter and record as well.    He reports that he has been having gait instability for the past 10 years. He was found to have dilated ventricles and underwent 2 prior LPs without improvement in his gait. Patient states that since May 2019 he has become weaker. He was previously able to ambulate with a walker (for the past 5-6 years), but has been falling and requiring a wheelchair. He notes that his left lower extremity is weaker than his right and he endorses left hip pain. He notes the weakness particularly attempting to move from a sitting to a standing position (he falls back into the chair). He describes the headache as a 9/10, throbbing, bifrontal, periorbital, and occipital in location. States he usually walks with a walker and now has a foot drop making it difficult to get around. Presents from rehab. Also is complaining of a left foot drop.    He states that his symptoms are the same as they were in September 2019, except that his left sided weakness is getting worse. He reports significant weakness from the left knee downward. He also reports having stiffness in the left side, which is getting worse. Denies dysphagia, but endorses dysarthria. Also reports numbness and tingling in the left side. He has bipolar d/o and has been on /1000, benztropine 0.5 mg, and risperdal 1.5 mg BID. 59 year old RH M with a PMH of NPH (s/p drainage twice), hypothyroidism, and bipolar disorder presents with 1 month of worsening headaches and right sided weakness. He presented to the hospital in September 15, 2019 for similar complaints. At that time, he was having headaches, diplopia, urinary incontinence (chronic), as well as left sided weakness. He underwent MRI brain and cervical spine which were both negative. History obtained from daughter and record as well.    He reports that he has been having gait instability for the past 10 years. He was found to have dilated ventricles and underwent 2 prior LPs without improvement in his gait. Patient states that since May 2019 he has become weaker. He was previously able to ambulate with a walker (for the past 5-6 years), but has been falling and requiring a wheelchair. He notes that his left lower extremity is weaker than his right and he endorses left hip pain. States he usually walks with a walker and now has a foot drop making it difficult to get around. Presents from rehab.     He states that his symptoms are the same as they were in September 2019, except that his left sided weakness is getting worse. He reports significant weakness from the left knee downward. He also reports having stiffness in the left side, which is getting worse. Denies dysphagia, but endorses dysarthria. Also reports numbness and tingling in the left side. He has bipolar d/o and has been on /1000, benztropine 0.5 mg, and risperdal 1.5 mg BID.

## 2019-10-11 NOTE — H&P ADULT - NSICDXPASTMEDICALHX_GEN_ALL_CORE_FT
PAST MEDICAL HISTORY:  Bipolar Disorder     Hydronephrosis with Renal and Ureteral Calculous Obstruction at last admission in [facility] 2010    Hypothyroidism     MR (mental retardation)     Nephrolithiasis

## 2019-10-11 NOTE — DISCHARGE NOTE PROVIDER - CARE PROVIDER_API CALL
Sky Cardenas)  Neurology  [address]  [place] [address]  Phone: [phone]  Fax: [phone]  Follow Up Time:

## 2019-10-11 NOTE — ED PROVIDER NOTE - CARE PLAN
Problem: Physical Therapy Goal  Goal: Physical Therapy Goal  Description  Goals to be met by: 10/19/2019     Patient will increase functional independence with mobility by performin. Supine to sit with Modified [place]  2. Sit to stand transfer with Modified Z[place]ala  3. Gait  x 100 feet with Modified Zavala using Rolling Walker.   4. Lower extremity exercise program x10 reps per handout, with independence     Outcome: Ongoing, Progressing   Pt is motivated to get better.  Pt ambulated ~12ft, ~20ft with RW, CGA needing seated rest break in between.  Demonstrates decreased endurance and fatigues easily with activity, despite wearing 4LO2 NC. Prior to admission pt was able to ambulate in grocery store with rollator on RA.  Pt educated on energy conservation with verbalization of understanding.  Pt would continue to benefit from skilled PT services to address pt's deficits and improve current level of function.   Principal Discharge DX:	Pneumonia of right lower lobe due to infectious organism  Secondary Diagnosis:	Sepsis

## 2019-10-11 NOTE — DISCHARGE NOTE PROVIDER - NSDCCPCAREPLAN_GEN_ALL_CORE_FT
PRINCIPAL DISCHARGE DIAGNOSIS  Diagnosis: Migraine  Assessment and Plan of Treatment: Take medication as prescribed  Follow up with neurologist      SECONDARY DISCHARGE DIAGNOSES  Diagnosis: Left-sided weakness  Assessment and Plan of Treatment: EMG outpatient

## 2019-10-11 NOTE — ED CLERICAL - BED REQUESTED
11-Oct-2019 02:01 Post-Care Instructions: I reviewed with the patient in detail post-care instructions. Patient is to wear sunprotection, and avoid picking at any of the treated lesions. Pt may apply Vaseline to crusted or scabbing areas. Consent: The patient's consent was obtained including but not limited to risks of crusting, scabbing, blistering, scarring, darker or lighter pigmentary change, recurrence, incomplete removal and infection. Render Post-Care Instructions In Note?: no Duration Of Freeze Thaw-Cycle (Seconds): 0 Detail Level: Detailed

## 2019-10-11 NOTE — H&P ADULT - NSHPREVIEWOFSYSTEMS_GEN_ALL_CORE
REVIEW OF SYSTEMS  --------------------------------------------------------------------------------  Gen: +weakness  Skin: No rashes  Head/Eyes/Ears/Mouth: +headache; +blurry vision  Respiratory: No dyspnea  : +urinary incontinence  Neuro: +weakness, +numbness, +tingling  Heme: No easy bruising or bleeding  Endo: No heat/cold intolerance  Psych: No significant nervousness, anxiety, stress, depression

## 2019-10-11 NOTE — H&P ADULT - NSHPPHYSICALEXAM_GEN_ALL_CORE
General:  Constitutional: Male, appears stated age, in no apparent distress     Neurological (>12):  MS: Awake, alert, oriented to person, place, situation, time. Normal affect. Follows all commands.    Language: mild to moderate possibly spastic dysarthria, speech is fluent    CNs: EOMI (pain with left eye movement), ?left temporal visual field defect. V1-3 intact to LT/pinprick, No facial asymmetry b/l, Tongue midline, normal movements, no atrophy. tongue not spastic.    Fundoscopic: pale w/ sharp discs margins No vascular changes.      Motor: increased tone in the LUE and LLE compared to the right. bradykinesia in LUE. no thenar atrophy noted.                Deltoid	Biceps	Triceps	   R	5	5	5	5		  L	5	5	5	5	  	H-Flex	K-Flex	K-Ext	D-Flex	P-Flex  R	5	5	5	5	5 	   L	3	3	2	3	3	     Sensation: decreased sensation to light touch on the left UE and LLE.     Reflexes:              Biceps(C5)       BR(C6)          Patellar(L4)    Achilles(S1)    Plantar Resp  R	3	          3             	3		    2		Down   L	3	          3	             	3		    2		Down     +crossed adductors in patellars  Faye negative    Coordination: No dysmetria to FTN. no HTS dysmetria with right leg. L leg too weak to participate in dysmetria testing.    Gait: wheelchair bound

## 2019-10-12 LAB — TSH SERPL-MCNC: 3.6 UIU/ML — SIGNIFICANT CHANGE UP (ref 0.27–4.2)

## 2019-11-12 ENCOUNTER — APPOINTMENT (OUTPATIENT)
Dept: NEUROLOGY | Facility: CLINIC | Age: 59
End: 2019-11-12

## 2019-11-12 PROCEDURE — 70450 CT HEAD/BRAIN W/O DYE: CPT

## 2019-11-12 PROCEDURE — 94640 AIRWAY INHALATION TREATMENT: CPT

## 2019-11-12 PROCEDURE — 80053 COMPREHEN METABOLIC PANEL: CPT

## 2019-11-12 PROCEDURE — 85027 COMPLETE CBC AUTOMATED: CPT

## 2019-11-12 PROCEDURE — 96374 THER/PROPH/DIAG INJ IV PUSH: CPT

## 2019-11-12 PROCEDURE — 99285 EMERGENCY DEPT VISIT HI MDM: CPT | Mod: 25

## 2019-11-12 PROCEDURE — 80061 LIPID PANEL: CPT

## 2019-11-12 PROCEDURE — 80164 ASSAY DIPROPYLACETIC ACD TOT: CPT

## 2019-11-12 PROCEDURE — 84443 ASSAY THYROID STIM HORMONE: CPT

## 2019-11-12 PROCEDURE — 82550 ASSAY OF CK (CPK): CPT

## 2019-11-19 ENCOUNTER — APPOINTMENT (OUTPATIENT)
Dept: CT IMAGING | Facility: CLINIC | Age: 59
End: 2019-11-19

## 2019-11-22 ENCOUNTER — APPOINTMENT (OUTPATIENT)
Dept: UROLOGY | Facility: CLINIC | Age: 59
End: 2019-11-22
Payer: MEDICARE

## 2019-11-22 PROCEDURE — 99213 OFFICE O/P EST LOW 20 MIN: CPT

## 2019-11-22 RX ORDER — OXYBUTYNIN CHLORIDE 10 MG/1
10 TABLET, EXTENDED RELEASE ORAL DAILY
Qty: 90 | Refills: 3 | Status: DISCONTINUED | COMMUNITY
Start: 2019-11-22 | End: 2019-11-22

## 2019-12-12 ENCOUNTER — APPOINTMENT (OUTPATIENT)
Dept: NEUROLOGY | Facility: CLINIC | Age: 59
End: 2019-12-12
Payer: MEDICARE

## 2019-12-12 VITALS
WEIGHT: 205 LBS | SYSTOLIC BLOOD PRESSURE: 118 MMHG | HEIGHT: 71 IN | DIASTOLIC BLOOD PRESSURE: 77 MMHG | BODY MASS INDEX: 28.7 KG/M2 | HEART RATE: 76 BPM

## 2019-12-12 DIAGNOSIS — R51 HEADACHE: ICD-10-CM

## 2019-12-12 PROCEDURE — 99215 OFFICE O/P EST HI 40 MIN: CPT

## 2019-12-13 NOTE — REVIEW OF SYSTEMS
[As Noted in HPI] : as noted in HPI [Negative] : Heme/Lymph [FreeTextEntry7] : Diarrhea 1 month ago, without fever, resolved

## 2019-12-13 NOTE — DATA REVIEWED
[de-identified] : September 17, 2019 (with and without contrast):\par - No acute intracranial abnormalities\par - Stable ventricular size\par - Paranasal sinus opacification [de-identified] : MRI Cervical Spine with and without contrast (9/17/19):\par - Multilevel lower disc bulges\par - C6/C7 central disc protrusion\par \par CT Head (10/10/19): No acute intracranial abnormality, Stable ventricular size\par \par CBC & CMP (10/10/19): Both normal\par Lipid panel (10/11/19): Notable for triglyceride 225 <H> & HDL 36 <L>\par Valproic acid level (10/11/19): 43 <L>\par CK (10/11/19): 113 (WNL)

## 2019-12-13 NOTE — HISTORY OF PRESENT ILLNESS
[FreeTextEntry1] : This is a 59-year-old right-handed man, currently a resident at [facility], who has previously been followed in the Neurology resident clinic for drug-induced parkinsonism, last seen on August 1, 2019. He states that he has had at least two falls since that clinic visit, leading to admissions at [facility]. Since his most recent discharge in September 2019, he has been using a wheelchair and has not been using a walker as before. He finds that whenever he has attempted to stand, he has had tremors in both of his legs. However, when he is seated or at rest, he rarely has any tremors. The patient also states that for at least the last month, he has been experiencing daily pressure-like headaches throughout his head, without radiation and without identifiable triggers or relieving factors.

## 2019-12-13 NOTE — ASSESSMENT
[FreeTextEntry1] : 59 RHM with concern for drug-induced parkinsonism, but with idiopathic Parkinson's disease not definitively ruled out; also reporting chronic daily headaches.

## 2019-12-13 NOTE — PHYSICAL EXAM
[General Appearance - Alert] : alert [Oriented To Time, Place, And Person] : oriented to person, place, and time [Person] : oriented to person [Place] : oriented to place [Time] : oriented to time [Visual Intact] : visual attention was ~T not ~L decreased [Fluency] : fluency intact [Cranial Nerves Optic (II)] : visual acuity intact bilaterally,  visual fields full to confrontation, pupils equal round and reactive to light [Cranial Nerves Oculomotor (III)] : extraocular motion intact [Cranial Nerves Trigeminal (V)] : facial sensation intact symmetrically [Cranial Nerves Facial (VII)] : face symmetrical [Cranial Nerves Vestibulocochlear (VIII)] : hearing was intact bilaterally [Cranial Nerves Glossopharyngeal (IX)] : tongue and palate midline [Cranial Nerves Accessory (XI - Cranial And Spinal)] : head turning and shoulder shrug symmetric [Cranial Nerves Hypoglossal (XII)] : there was no tongue deviation with protrusion [Motor Handedness Right-Handed] : the patient is right hand dominant [3] : Hygiene - 3 [0] : Lower extremity: left - 0 [1] : Posture - 1 [4] : Postural stability - 4 [2] : Body bradykinesia - 2 [Sensation Tactile Decrease] : light touch was intact [Sensation Pain / Temperature Decrease] : pain and temperature was intact [Romberg's Sign] : a positive Romberg's sign was present [2+] : Patella left 2+ [1+] : Ankle jerk left 1+ [Sclera] : the sclera and conjunctiva were normal [PERRL With Normal Accommodation] : pupils were equal in size, round, reactive to light, with normal accommodation [Extraocular Movements] : extraocular movements were intact [Optic Disc Abnormality] : the optic disc were normal in size and color [Outer Ear] : the ears and nose were normal in appearance [Oropharynx] : the oropharynx was normal [Neck Appearance] : the appearance of the neck was normal [Auscultation Breath Sounds / Voice Sounds] : lungs were clear to auscultation bilaterally [Heart Rate And Rhythm] : heart rate was normal and rhythm regular [Heart Sounds] : normal S1 and S2 [Arterial Pulses Carotid] : carotid pulses were normal with no bruits [Full Pulse] : the pedal pulses are present [Bowel Sounds] : normal bowel sounds [Abdomen Soft] : soft [Abdomen Tenderness] : non-tender [No CVA Tenderness] : no ~M costovertebral angle tenderness [No Spinal Tenderness] : no spinal tenderness [Nail Clubbing] : no clubbing  or cyanosis of the fingernails [Skin Color & Pigmentation] : normal skin color and pigmentation [Skin Turgor] : normal skin turgor [] : no rash [Paresis Pronator Drift Left-Sided] : no pronator drift on the left [Paresis Pronator Drift Right-Sided] : no pronator drift on the right [Motor Strength Upper Extremities Bilaterally] : strength was normal in both upper extremities [Past-pointing] : there was no past-pointing [Tremor] : no tremor present [Coordination - Dysmetria Impaired Finger-to-Nose Bilateral] : not present [Coordination - Dysmetria Impaired Heel-to-Shin Bilateral] : not present [Plantar Reflex Right Only] : normal on the right [Plantar Reflex Left Only] : normal on the left [___] : absent on the right [___] : absent on the left [FreeTextEntry4] : Dysarthric speech [FreeTextEntry9] : 12/12/2019 [de-identified] : 0 [de-identified] : 0 [FreeTextEntry2] : 4 [de-identified] : 18 [FreeTextEntry6] : 28 [de-identified] : 3 [de-identified] : 3 [de-identified] : 30 [de-identified] : 40 [FreeTextEntry8] : Wide-based stance. Unable to ambulate or perform specialized gaits without assistance.

## 2019-12-13 NOTE — REASON FOR VISIT
[Initial Eval - Existing Diagnosis] : an initial evaluation of an existing diagnosis [Other: _____] : [unfilled] [FreeTextEntry1] : Parkinsonism

## 2019-12-19 ENCOUNTER — APPOINTMENT (OUTPATIENT)
Dept: UROLOGY | Facility: CLINIC | Age: 59
End: 2019-12-19
Payer: MEDICARE

## 2019-12-19 DIAGNOSIS — Z12.5 ENCOUNTER FOR SCREENING FOR MALIGNANT NEOPLASM OF PROSTATE: ICD-10-CM

## 2019-12-19 LAB
PSA FREE FLD-MCNC: 41 %
PSA FREE SERPL-MCNC: 0.12 NG/ML
PSA SERPL-MCNC: 0.3 NG/ML

## 2019-12-19 PROCEDURE — 99213 OFFICE O/P EST LOW 20 MIN: CPT

## 2019-12-19 RX ORDER — TROSPIUM CHLORIDE 20 MG/1
20 TABLET, FILM COATED ORAL TWICE DAILY
Qty: 180 | Refills: 3 | Status: DISCONTINUED | COMMUNITY
Start: 2019-11-22 | End: 2019-12-19

## 2019-12-19 NOTE — PHYSICAL EXAM
[General Appearance - Well Developed] : well developed [Normal Appearance] : normal appearance [General Appearance - Well Nourished] : well nourished [Well Groomed] : well groomed [Abdomen Soft] : soft [General Appearance - In No Acute Distress] : no acute distress [Costovertebral Angle Tenderness] : no ~M costovertebral angle tenderness [Abdomen Tenderness] : non-tender [Urethral Meatus] : meatus normal [Urinary Bladder Findings] : the bladder was normal on palpation [Scrotum] : the scrotum was normal [Testes Mass (___cm)] : there were no testicular masses [Edema] : no peripheral edema [] : no respiratory distress [Respiration, Rhythm And Depth] : normal respiratory rhythm and effort [Exaggerated Use Of Accessory Muscles For Inspiration] : no accessory muscle use [Oriented To Time, Place, And Person] : oriented to person, place, and time [Affect] : the affect was normal [Mood] : the mood was normal [Not Anxious] : not anxious [Normal Station and Gait] : the gait and station were normal for the patient's age [No Focal Deficits] : no focal deficits [No Palpable Adenopathy] : no palpable adenopathy

## 2019-12-20 NOTE — ASSESSMENT
[FreeTextEntry1] : We again reviewed the fact that his nocturnal enuresis is likely at least in part if not completely related to his psychiatric medications, primarily Risperdal.  He sleeps extremely deeply and does not wake with the urge to urinate which is probably what is leading to the nocturnal enuresis.  I would recommend that he continue to restrict nighttime fluids and potentially consider setting an alarm to try to wake up overnight to urinate.\par \par With regard to prostate cancer screening, we did discuss the fact that PSA is a nonspecific test and there are multiple reasons why the PSA may be elevated.  He has not been noted to have any PSA elevation in the past.  I did check his PSA today at his request which was 0.3 ng/mL suggesting very low risk of harboring clinically significant prostate cancer.  I would not recommend that he need to consider prostate imaging or biopsy at this time.  I will see him back moving forward on an as-needed basis.

## 2019-12-20 NOTE — HISTORY OF PRESENT ILLNESS
[FreeTextEntry1] : Tato Vera returns to the office today.  He is a 59-year-old man with parkinsonism thought to be related to underlying medications for bipolar disorder.  He has had longstanding nocturnal enuresis for many years.  I had seen him in the past and also more recently regarding those symptoms.  He had wanted to try to do something about the nocturnal enuresis or is and I did give him a prescription for trospium.  He is back today now 1 month later for reevaluation.  He did trial the medication but says that he did not notice any significant changes.  He did not have any noticeable side effects with the medication.  He denies any other change.\par \par The other issue he would like to be addressed today is prostate cancer screening.  He says his father was affected by prostate cancer and he himself would like to be screened as well.

## 2020-02-28 ENCOUNTER — APPOINTMENT (OUTPATIENT)
Dept: UROLOGY | Facility: CLINIC | Age: 60
End: 2020-02-28
Payer: MEDICARE

## 2020-02-28 DIAGNOSIS — N39.41 URGE INCONTINENCE: ICD-10-CM

## 2020-02-28 DIAGNOSIS — F31.9 BIPOLAR DISORDER, UNSPECIFIED: ICD-10-CM

## 2020-02-28 PROCEDURE — 99214 OFFICE O/P EST MOD 30 MIN: CPT

## 2020-02-29 PROBLEM — N39.41 URGE INCONTINENCE OF URINE: Status: ACTIVE | Noted: 2019-11-22

## 2020-02-29 PROBLEM — F31.9 BIPOLAR DISORDER: Status: ACTIVE | Noted: 2018-07-17

## 2020-04-29 NOTE — ED ADULT NURSE NOTE - DISCHARGE TEACHING

## 2020-09-05 ENCOUNTER — EMERGENCY (EMERGENCY)
Facility: HOSPITAL | Age: 60
LOS: 1 days | Discharge: HOME | End: 2020-09-05
Attending: STUDENT IN AN ORGANIZED HEALTH CARE EDUCATION/TRAINING PROGRAM
Payer: COMMERCIAL

## 2020-09-05 VITALS
RESPIRATION RATE: 17 BRPM | HEART RATE: 85 BPM | DIASTOLIC BLOOD PRESSURE: 86 MMHG | WEIGHT: 160.06 LBS | TEMPERATURE: 98 F | SYSTOLIC BLOOD PRESSURE: 129 MMHG | HEIGHT: 71 IN | OXYGEN SATURATION: 99 %

## 2020-09-05 VITALS
TEMPERATURE: 98 F | RESPIRATION RATE: 18 BRPM | HEART RATE: 71 BPM | OXYGEN SATURATION: 100 % | SYSTOLIC BLOOD PRESSURE: 110 MMHG | DIASTOLIC BLOOD PRESSURE: 66 MMHG

## 2020-09-05 LAB
ALBUMIN SERPL ELPH-MCNC: 4.4 G/DL — SIGNIFICANT CHANGE UP (ref 3.3–5)
ALP SERPL-CCNC: 48 U/L — SIGNIFICANT CHANGE UP (ref 40–120)
ALT FLD-CCNC: 15 U/L — SIGNIFICANT CHANGE UP (ref 10–45)
ANION GAP SERPL CALC-SCNC: 12 MMOL/L — SIGNIFICANT CHANGE UP (ref 5–17)
APPEARANCE UR: CLEAR — SIGNIFICANT CHANGE UP
APTT BLD: 29.9 SEC — SIGNIFICANT CHANGE UP (ref 27.5–35.5)
AST SERPL-CCNC: 19 U/L — SIGNIFICANT CHANGE UP (ref 10–40)
BACTERIA # UR AUTO: 0 — SIGNIFICANT CHANGE UP
BASOPHILS # BLD AUTO: 0.05 K/UL — SIGNIFICANT CHANGE UP (ref 0–0.2)
BASOPHILS NFR BLD AUTO: 0.7 % — SIGNIFICANT CHANGE UP (ref 0–2)
BILIRUB SERPL-MCNC: 0.2 MG/DL — SIGNIFICANT CHANGE UP (ref 0.2–1.2)
BILIRUB UR-MCNC: NEGATIVE — SIGNIFICANT CHANGE UP
BUN SERPL-MCNC: 19 MG/DL — SIGNIFICANT CHANGE UP (ref 7–23)
CALCIUM SERPL-MCNC: 9.7 MG/DL — SIGNIFICANT CHANGE UP (ref 8.4–10.5)
CHLORIDE SERPL-SCNC: 99 MMOL/L — SIGNIFICANT CHANGE UP (ref 96–108)
CO2 SERPL-SCNC: 27 MMOL/L — SIGNIFICANT CHANGE UP (ref 22–31)
COLOR SPEC: SIGNIFICANT CHANGE UP
CREAT SERPL-MCNC: 0.85 MG/DL — SIGNIFICANT CHANGE UP (ref 0.5–1.3)
DIFF PNL FLD: NEGATIVE — SIGNIFICANT CHANGE UP
EOSINOPHIL # BLD AUTO: 0.26 K/UL — SIGNIFICANT CHANGE UP (ref 0–0.5)
EOSINOPHIL NFR BLD AUTO: 3.6 % — SIGNIFICANT CHANGE UP (ref 0–6)
EPI CELLS # UR: 0 /HPF — SIGNIFICANT CHANGE UP
GLUCOSE SERPL-MCNC: 107 MG/DL — HIGH (ref 70–99)
GLUCOSE UR QL: NEGATIVE — SIGNIFICANT CHANGE UP
HCT VFR BLD CALC: 41.1 % — SIGNIFICANT CHANGE UP (ref 39–50)
HGB BLD-MCNC: 13.2 G/DL — SIGNIFICANT CHANGE UP (ref 13–17)
HIV 1 & 2 AB SERPL IA.RAPID: SIGNIFICANT CHANGE UP
IMM GRANULOCYTES NFR BLD AUTO: 0.5 % — SIGNIFICANT CHANGE UP (ref 0–1.5)
INR BLD: 1.03 RATIO — SIGNIFICANT CHANGE UP (ref 0.88–1.16)
KETONES UR-MCNC: ABNORMAL
LEUKOCYTE ESTERASE UR-ACNC: NEGATIVE — SIGNIFICANT CHANGE UP
LIDOCAIN IGE QN: 26 U/L — SIGNIFICANT CHANGE UP (ref 7–60)
LYMPHOCYTES # BLD AUTO: 1.88 K/UL — SIGNIFICANT CHANGE UP (ref 1–3.3)
LYMPHOCYTES # BLD AUTO: 25.8 % — SIGNIFICANT CHANGE UP (ref 13–44)
MCHC RBC-ENTMCNC: 30.3 PG — SIGNIFICANT CHANGE UP (ref 27–34)
MCHC RBC-ENTMCNC: 32.1 GM/DL — SIGNIFICANT CHANGE UP (ref 32–36)
MCV RBC AUTO: 94.3 FL — SIGNIFICANT CHANGE UP (ref 80–100)
MONOCYTES # BLD AUTO: 0.88 K/UL — SIGNIFICANT CHANGE UP (ref 0–0.9)
MONOCYTES NFR BLD AUTO: 12.1 % — SIGNIFICANT CHANGE UP (ref 2–14)
NEUTROPHILS # BLD AUTO: 4.17 K/UL — SIGNIFICANT CHANGE UP (ref 1.8–7.4)
NEUTROPHILS NFR BLD AUTO: 57.3 % — SIGNIFICANT CHANGE UP (ref 43–77)
NITRITE UR-MCNC: NEGATIVE — SIGNIFICANT CHANGE UP
NRBC # BLD: 0 /100 WBCS — SIGNIFICANT CHANGE UP (ref 0–0)
PH UR: 6.5 — SIGNIFICANT CHANGE UP (ref 5–8)
PLATELET # BLD AUTO: 169 K/UL — SIGNIFICANT CHANGE UP (ref 150–400)
POTASSIUM SERPL-MCNC: 4.8 MMOL/L — SIGNIFICANT CHANGE UP (ref 3.5–5.3)
POTASSIUM SERPL-SCNC: 4.8 MMOL/L — SIGNIFICANT CHANGE UP (ref 3.5–5.3)
PROT SERPL-MCNC: 7.3 G/DL — SIGNIFICANT CHANGE UP (ref 6–8.3)
PROT UR-MCNC: NEGATIVE — SIGNIFICANT CHANGE UP
PROTHROM AB SERPL-ACNC: 12.2 SEC — SIGNIFICANT CHANGE UP (ref 10.6–13.6)
RBC # BLD: 4.36 M/UL — SIGNIFICANT CHANGE UP (ref 4.2–5.8)
RBC # FLD: 13.4 % — SIGNIFICANT CHANGE UP (ref 10.3–14.5)
RBC CASTS # UR COMP ASSIST: 1 /HPF — SIGNIFICANT CHANGE UP (ref 0–4)
SODIUM SERPL-SCNC: 138 MMOL/L — SIGNIFICANT CHANGE UP (ref 135–145)
SP GR SPEC: 1.02 — SIGNIFICANT CHANGE UP (ref 1.01–1.02)
UROBILINOGEN FLD QL: NEGATIVE — SIGNIFICANT CHANGE UP
WBC # BLD: 7.28 K/UL — SIGNIFICANT CHANGE UP (ref 3.8–10.5)
WBC # FLD AUTO: 7.28 K/UL — SIGNIFICANT CHANGE UP (ref 3.8–10.5)
WBC UR QL: 0 /HPF — SIGNIFICANT CHANGE UP (ref 0–5)

## 2020-09-05 PROCEDURE — 81001 URINALYSIS AUTO W/SCOPE: CPT

## 2020-09-05 PROCEDURE — 74176 CT ABD & PELVIS W/O CONTRAST: CPT | Mod: 26

## 2020-09-05 PROCEDURE — 96375 TX/PRO/DX INJ NEW DRUG ADDON: CPT

## 2020-09-05 PROCEDURE — 86703 HIV-1/HIV-2 1 RESULT ANTBDY: CPT

## 2020-09-05 PROCEDURE — 80053 COMPREHEN METABOLIC PANEL: CPT

## 2020-09-05 PROCEDURE — 83690 ASSAY OF LIPASE: CPT

## 2020-09-05 PROCEDURE — 85730 THROMBOPLASTIN TIME PARTIAL: CPT

## 2020-09-05 PROCEDURE — 96366 THER/PROPH/DIAG IV INF ADDON: CPT

## 2020-09-05 PROCEDURE — 85610 PROTHROMBIN TIME: CPT

## 2020-09-05 PROCEDURE — 96365 THER/PROPH/DIAG IV INF INIT: CPT

## 2020-09-05 PROCEDURE — 87086 URINE CULTURE/COLONY COUNT: CPT

## 2020-09-05 PROCEDURE — 74176 CT ABD & PELVIS W/O CONTRAST: CPT

## 2020-09-05 PROCEDURE — 96361 HYDRATE IV INFUSION ADD-ON: CPT

## 2020-09-05 PROCEDURE — 99284 EMERGENCY DEPT VISIT MOD MDM: CPT | Mod: 25

## 2020-09-05 PROCEDURE — 99285 EMERGENCY DEPT VISIT HI MDM: CPT

## 2020-09-05 PROCEDURE — 85027 COMPLETE CBC AUTOMATED: CPT

## 2020-09-05 RX ORDER — METRONIDAZOLE 500 MG
500 TABLET ORAL ONCE
Refills: 0 | Status: DISCONTINUED | OUTPATIENT
Start: 2020-09-05 | End: 2020-09-05

## 2020-09-05 RX ORDER — AMPICILLIN SODIUM AND SULBACTAM SODIUM 250; 125 MG/ML; MG/ML
3 INJECTION, POWDER, FOR SUSPENSION INTRAMUSCULAR; INTRAVENOUS ONCE
Refills: 0 | Status: COMPLETED | OUTPATIENT
Start: 2020-09-05 | End: 2020-09-05

## 2020-09-05 RX ORDER — ACETAMINOPHEN 500 MG
975 TABLET ORAL ONCE
Refills: 0 | Status: COMPLETED | OUTPATIENT
Start: 2020-09-05 | End: 2020-09-05

## 2020-09-05 RX ORDER — ONDANSETRON 8 MG/1
4 TABLET, FILM COATED ORAL ONCE
Refills: 0 | Status: COMPLETED | OUTPATIENT
Start: 2020-09-05 | End: 2020-09-05

## 2020-09-05 RX ORDER — SODIUM CHLORIDE 9 MG/ML
1000 INJECTION INTRAMUSCULAR; INTRAVENOUS; SUBCUTANEOUS ONCE
Refills: 0 | Status: COMPLETED | OUTPATIENT
Start: 2020-09-05 | End: 2020-09-05

## 2020-09-05 RX ORDER — KETOROLAC TROMETHAMINE 30 MG/ML
15 SYRINGE (ML) INJECTION ONCE
Refills: 0 | Status: DISCONTINUED | OUTPATIENT
Start: 2020-09-05 | End: 2020-09-05

## 2020-09-05 RX ORDER — CIPROFLOXACIN LACTATE 400MG/40ML
400 VIAL (ML) INTRAVENOUS ONCE
Refills: 0 | Status: DISCONTINUED | OUTPATIENT
Start: 2020-09-05 | End: 2020-09-05

## 2020-09-05 RX ADMIN — Medication 975 MILLIGRAM(S): at 16:08

## 2020-09-05 RX ADMIN — Medication 15 MILLIGRAM(S): at 19:02

## 2020-09-05 RX ADMIN — AMPICILLIN SODIUM AND SULBACTAM SODIUM 3 GRAM(S): 250; 125 INJECTION, POWDER, FOR SUSPENSION INTRAMUSCULAR; INTRAVENOUS at 20:17

## 2020-09-05 RX ADMIN — Medication 975 MILLIGRAM(S): at 17:08

## 2020-09-05 RX ADMIN — ONDANSETRON 4 MILLIGRAM(S): 8 TABLET, FILM COATED ORAL at 15:54

## 2020-09-05 RX ADMIN — SODIUM CHLORIDE 1000 MILLILITER(S): 9 INJECTION INTRAMUSCULAR; INTRAVENOUS; SUBCUTANEOUS at 17:42

## 2020-09-05 RX ADMIN — SODIUM CHLORIDE 1000 MILLILITER(S): 9 INJECTION INTRAMUSCULAR; INTRAVENOUS; SUBCUTANEOUS at 15:54

## 2020-09-05 RX ADMIN — Medication 15 MILLIGRAM(S): at 18:04

## 2020-09-05 RX ADMIN — AMPICILLIN SODIUM AND SULBACTAM SODIUM 200 GRAM(S): 250; 125 INJECTION, POWDER, FOR SUSPENSION INTRAMUSCULAR; INTRAVENOUS at 17:54

## 2020-09-05 NOTE — ED PROVIDER NOTE - PROGRESS NOTE DETAILS
AV: CT shows acute diverticulitis, uncomplicated. Will give antibiotics, likely dc home. Spoked to SANTY Nava and informed pt's status (uncomplicated acute Diverticulitis in CT) and d/c to the facility with Augmentin. Awaiting for an ambulance for d/c. Spoked to nurse supervisor BRIANA Campoverde at [facility] and informed pt's status and D/C to the facility. Informed pt needs to take Augmentin 875mg every 12 hours for 10days at the facility for acute Diverticulitis. I was told don't send e-prescription and will take care of the med at the facility.

## 2020-09-05 NOTE — ED PROVIDER NOTE - NSFOLLOWUPINSTRUCTIONS_ED_ALL_ED_FT
Keep continue his current medications.  Take Augmentin 875mg every 12 hours for 10days.  Follow up with his primary Dr. in 2days for reevaluation.  Return for any concerns or worsening symptoms.

## 2020-09-05 NOTE — ED PROVIDER NOTE - PHYSICAL EXAMINATION
NAD, VSS, Afebrile, A&Ox3, Neck supple. Lungs clear. + Left sided abd tender with CVA tender. No focal neuro deficit.

## 2020-09-05 NOTE — ED ADULT NURSE NOTE - OBJECTIVE STATEMENT
60M to ED c/o Left Lower abd pain since yesterday, nausea/vomiting. Patient states white emesis and has not been able to keep food down today at all. Patient is from [facility]. He is alert and oriented x3, calm/cooperative, NAD, VSS. Patient denies chest pain, SOB, fever. Patient has a 20 gauge IV to his LAC placed in the ED. Patient states hx of kidney stones but not recently. States hx of appendectomy.

## 2020-09-05 NOTE — ED PROVIDER NOTE - OBJECTIVE STATEMENT
59yo male pt with PMHx of NPH (s/p drainage twice), Chronic migraine headache, hypothyroidism, and bipolar disorder, MR, GERD, BPH, HLD, PSHx of appendectomy (1996) was brought to ED from [facility] c/o left sided abd pain with N/V since yesterday. Denies fever, chills, cough or congestion. Denies worsening headache or dizziness. Denies CP/SOB or diarrhea/constipation. Denies urinary burning, frequency or urgency. Denies sensory changes or weakness to extremities.

## 2020-09-05 NOTE — ED PROVIDER NOTE - PATIENT PORTAL LINK FT
You can access the FollowMyHealth Patient Portal offered by [facility] by registering at the following website: [email or web address]. By joining [employer]’s FollowMyHealth portal, you will also be able to view your health information using other applications (apps) compatible with our system.

## 2020-09-05 NOTE — ED PROVIDER NOTE - ATTENDING CONTRIBUTION TO CARE
Attending MD Chilel:   I personally have seen and examined this patient.  ACP, Resident, medical student note reviewed and agree on plan of care and except where noted.     60y M PMH MR, bipolar, NPH s/p drainage, migraines, s/p appy presents with left lower quadrant pain, left flank pain x 2 days. Started yesterday, pain was initially intermittent, but became more constant and severe today, associated with nausea, vomiting. Endorses foul-smelling urine and difficulty urinating, left low back pain.    on exam patient is well appearing, vitals wnl, rrr s1s2, lungs clear, abdomen soft, tenderness to palpation left lower quadrant, no rebound or guarding, no CVAT, lower back without tenderness to palpation either paraspinal or midline, no rash, A+O x 3, moving all 4 ext.    Differential includes but is not limited to diverticulitis, nephrolithiasis, UTI/earlu pyelo. will obtain labs, urine, CTAP, give antiemetics and pain control, reassess.

## 2020-09-05 NOTE — ED CLERICAL - NS ED CLERK NOTE PRE-ARRIVAL INFORMATION; ADDITIONAL PRE-ARRIVAL INFORMATION
CC/Reason For referral: resident c/o left flank pain  x2days.labs sent , urine clean. Patient refusing further w/u or abx  until seen in ed.  If w/u negative , patient can return to facility  Preferred Consultant(if applicable): na  Who admits for you (if needed): na  Do you have documents you would like to fax over? with patient  Would you still like to speak to an ED attending? please call with disposition

## 2020-09-05 NOTE — ED ADULT NURSE NOTE - NSIMPLEMENTINTERV_GEN_ALL_ED
Implemented All Fall Risk Interventions:  [place] to call system. Call bell, personal items and telephone within reach. Instruct patient to call for assistance. Room bathroom lighting operational. Non-slip footwear when patient is off stretcher. Physically safe environment: no spills, clutter or unnecessary equipment. Stretcher in lowest position, wheels locked, appropriate side rails in place. Provide visual cue, wrist band, yellow gown, etc. Monitor gait and stability. Monitor for mental status changes and reorient to person, place, and time. Review medications for side effects contributing to fall risk. Reinforce activity limits and safety measures with patient and family.

## 2020-09-06 LAB
CULTURE RESULTS: SIGNIFICANT CHANGE UP
SPECIMEN SOURCE: SIGNIFICANT CHANGE UP

## 2020-10-21 ENCOUNTER — APPOINTMENT (OUTPATIENT)
Dept: CT IMAGING | Facility: IMAGING CENTER | Age: 60
End: 2020-10-21
Payer: MEDICARE

## 2020-10-21 ENCOUNTER — OUTPATIENT (OUTPATIENT)
Dept: OUTPATIENT SERVICES | Facility: HOSPITAL | Age: 60
LOS: 1 days | End: 2020-10-21
Payer: COMMERCIAL

## 2020-10-21 DIAGNOSIS — Z00.8 ENCOUNTER FOR OTHER GENERAL EXAMINATION: ICD-10-CM

## 2020-10-21 PROCEDURE — 74177 CT ABD & PELVIS W/CONTRAST: CPT

## 2020-10-21 PROCEDURE — 74177 CT ABD & PELVIS W/CONTRAST: CPT | Mod: 26

## 2020-11-10 ENCOUNTER — APPOINTMENT (OUTPATIENT)
Dept: GASTROENTEROLOGY | Facility: CLINIC | Age: 60
End: 2020-11-10
Payer: MEDICARE

## 2020-11-10 VITALS
BODY MASS INDEX: 28 KG/M2 | OXYGEN SATURATION: 97 % | HEART RATE: 69 BPM | SYSTOLIC BLOOD PRESSURE: 116 MMHG | TEMPERATURE: 97.6 F | DIASTOLIC BLOOD PRESSURE: 68 MMHG | RESPIRATION RATE: 14 BRPM | WEIGHT: 200 LBS | HEIGHT: 71 IN

## 2020-11-10 DIAGNOSIS — R19.7 DIARRHEA, UNSPECIFIED: ICD-10-CM

## 2020-11-10 PROCEDURE — 99214 OFFICE O/P EST MOD 30 MIN: CPT

## 2020-11-10 PROCEDURE — 99072 ADDL SUPL MATRL&STAF TM PHE: CPT

## 2020-11-10 NOTE — PHYSICAL EXAM
[General Appearance - Alert] : alert [General Appearance - In No Acute Distress] : in no acute distress [Bowel Sounds] : normal bowel sounds [Abdomen Soft] : soft [] : no hepato-splenomegaly [Abdomen Mass (___ Cm)] : no abdominal mass palpated [Oriented To Time, Place, And Person] : oriented to person, place, and time [Impaired Insight] : insight and judgment were intact [Affect] : the affect was normal [FreeTextEntry1] : slow speech but well oriented

## 2020-11-10 NOTE — ASSESSMENT
[FreeTextEntry1] : case discussed with patient NP over the phone,Marzena [phone] and with his sister Sherry Mike\par \par Impression\par Diarrhea, bleeding, \par Likely persistent cdiff\par Rule out other colitis\par \par Plan \par Stool testing as ordered\par Further rec to follow\par May need repeat colonoscopy

## 2020-11-10 NOTE — HISTORY OF PRESENT ILLNESS
[de-identified] : dictation inactie\par \par 60 yr male, group residence , well oriented, provides his own history\par Recent diverticulitis confirmed by CT in September (NS) with improvement on follow up exam in October.  Descending colon\par Patient has had diarrhea 6 to 9 times daily since Rx\par Some blood\par History of C diff (no results available)\par Patient states he felt WORSE with Vanco\par Last colonoscopy 2018, polyp, diverticulosis, no colitis\par \par

## 2020-11-10 NOTE — REASON FOR VISIT
[Follow-Up: _____] : a [unfilled] follow-up visit [Formal Caregiver] : formal caregiver [FreeTextEntry1] : bloody diarrhea

## 2020-11-13 LAB
C DIFF TOX GENS STL QL NAA+PROBE: NORMAL
CDIFF BY PCR: DETECTED
G LAMBLIA AG STL QL: NORMAL
GI PCR PANEL, STOOL: NORMAL

## 2020-11-16 ENCOUNTER — NON-APPOINTMENT (OUTPATIENT)
Age: 60
End: 2020-11-16

## 2020-11-16 DIAGNOSIS — K52.9 NONINFECTIVE GASTROENTERITIS AND COLITIS, UNSPECIFIED: ICD-10-CM

## 2020-11-17 LAB
C DIFF TOX B STL QL CT TISS CULT: NORMAL
CALPROTECTIN FECAL: 114 UG/G
DEPRECATED O AND P PREP STL: NORMAL
Lab: NORMAL

## 2020-12-10 ENCOUNTER — NON-APPOINTMENT (OUTPATIENT)
Age: 60
End: 2020-12-10

## 2021-02-25 ENCOUNTER — APPOINTMENT (OUTPATIENT)
Dept: UROLOGY | Facility: CLINIC | Age: 61
End: 2021-02-25
Payer: MEDICARE

## 2021-02-25 VITALS — SYSTOLIC BLOOD PRESSURE: 118 MMHG | HEART RATE: 64 BPM | DIASTOLIC BLOOD PRESSURE: 82 MMHG | TEMPERATURE: 97.8 F

## 2021-02-25 DIAGNOSIS — N39.44 NOCTURNAL ENURESIS: ICD-10-CM

## 2021-02-25 PROCEDURE — 99213 OFFICE O/P EST LOW 20 MIN: CPT

## 2021-02-25 PROCEDURE — 99072 ADDL SUPL MATRL&STAF TM PHE: CPT

## 2021-04-05 ENCOUNTER — OUTPATIENT (OUTPATIENT)
Dept: OUTPATIENT SERVICES | Facility: HOSPITAL | Age: 61
LOS: 1 days | End: 2021-04-05
Payer: MEDICARE

## 2021-04-05 ENCOUNTER — APPOINTMENT (OUTPATIENT)
Dept: CT IMAGING | Facility: CLINIC | Age: 61
End: 2021-04-05
Payer: MEDICARE

## 2021-04-05 DIAGNOSIS — R10.9 UNSPECIFIED ABDOMINAL PAIN: ICD-10-CM

## 2021-04-05 PROCEDURE — 74176 CT ABD & PELVIS W/O CONTRAST: CPT | Mod: 26

## 2021-04-05 PROCEDURE — 74176 CT ABD & PELVIS W/O CONTRAST: CPT

## 2021-04-08 ENCOUNTER — NON-APPOINTMENT (OUTPATIENT)
Age: 61
End: 2021-04-08

## 2021-04-14 ENCOUNTER — NON-APPOINTMENT (OUTPATIENT)
Age: 61
End: 2021-04-14

## 2021-04-28 ENCOUNTER — EMERGENCY (EMERGENCY)
Facility: HOSPITAL | Age: 61
LOS: 1 days | Discharge: HOME | End: 2021-04-28
Attending: EMERGENCY MEDICINE
Payer: MEDICARE

## 2021-04-28 ENCOUNTER — APPOINTMENT (OUTPATIENT)
Dept: COLORECTAL SURGERY | Facility: CLINIC | Age: 61
End: 2021-04-28
Payer: MEDICARE

## 2021-04-28 VITALS
OXYGEN SATURATION: 98 % | WEIGHT: 195 LBS | RESPIRATION RATE: 18 BRPM | BODY MASS INDEX: 27.3 KG/M2 | TEMPERATURE: 98.1 F | DIASTOLIC BLOOD PRESSURE: 73 MMHG | SYSTOLIC BLOOD PRESSURE: 116 MMHG | HEIGHT: 71 IN | HEART RATE: 74 BPM

## 2021-04-28 VITALS
HEART RATE: 73 BPM | OXYGEN SATURATION: 98 % | TEMPERATURE: 98.2 F | SYSTOLIC BLOOD PRESSURE: 121 MMHG | DIASTOLIC BLOOD PRESSURE: 77 MMHG

## 2021-04-28 VITALS
RESPIRATION RATE: 15 BRPM | HEART RATE: 55 BPM | SYSTOLIC BLOOD PRESSURE: 121 MMHG | DIASTOLIC BLOOD PRESSURE: 82 MMHG | OXYGEN SATURATION: 99 % | TEMPERATURE: 99 F

## 2021-04-28 VITALS
SYSTOLIC BLOOD PRESSURE: 119 MMHG | OXYGEN SATURATION: 98 % | HEIGHT: 71 IN | DIASTOLIC BLOOD PRESSURE: 72 MMHG | HEART RATE: 70 BPM | RESPIRATION RATE: 18 BRPM | WEIGHT: 197.09 LBS | TEMPERATURE: 98 F

## 2021-04-28 DIAGNOSIS — R10.32 LEFT LOWER QUADRANT PAIN: ICD-10-CM

## 2021-04-28 DIAGNOSIS — K52.9 NONINFECTIVE GASTROENTERITIS AND COLITIS, UNSPECIFIED: ICD-10-CM

## 2021-04-28 DIAGNOSIS — Z86.59 PERSONAL HISTORY OF OTHER MENTAL AND BEHAVIORAL DISORDERS: ICD-10-CM

## 2021-04-28 DIAGNOSIS — Z80.0 FAMILY HISTORY OF MALIGNANT NEOPLASM OF DIGESTIVE ORGANS: ICD-10-CM

## 2021-04-28 LAB
ALBUMIN SERPL ELPH-MCNC: 4.5 G/DL — SIGNIFICANT CHANGE UP (ref 3.3–5)
ALP SERPL-CCNC: 43 U/L — SIGNIFICANT CHANGE UP (ref 40–120)
ALT FLD-CCNC: 13 U/L — SIGNIFICANT CHANGE UP (ref 10–45)
ANION GAP SERPL CALC-SCNC: 17 MMOL/L — SIGNIFICANT CHANGE UP (ref 5–17)
APPEARANCE UR: CLEAR — SIGNIFICANT CHANGE UP
AST SERPL-CCNC: 15 U/L — SIGNIFICANT CHANGE UP (ref 10–40)
BACTERIA # UR AUTO: NEGATIVE — SIGNIFICANT CHANGE UP
BASOPHILS # BLD AUTO: 0.06 K/UL — SIGNIFICANT CHANGE UP (ref 0–0.2)
BASOPHILS NFR BLD AUTO: 1 % — SIGNIFICANT CHANGE UP (ref 0–2)
BILIRUB SERPL-MCNC: 0.2 MG/DL — SIGNIFICANT CHANGE UP (ref 0.2–1.2)
BILIRUB UR-MCNC: NEGATIVE — SIGNIFICANT CHANGE UP
BUN SERPL-MCNC: 21 MG/DL — SIGNIFICANT CHANGE UP (ref 7–23)
CALCIUM SERPL-MCNC: 9.9 MG/DL — SIGNIFICANT CHANGE UP (ref 8.4–10.5)
CHLORIDE SERPL-SCNC: 103 MMOL/L — SIGNIFICANT CHANGE UP (ref 96–108)
CO2 SERPL-SCNC: 23 MMOL/L — SIGNIFICANT CHANGE UP (ref 22–31)
COLOR SPEC: YELLOW — SIGNIFICANT CHANGE UP
CREAT SERPL-MCNC: 0.83 MG/DL — SIGNIFICANT CHANGE UP (ref 0.5–1.3)
DIFF PNL FLD: NEGATIVE — SIGNIFICANT CHANGE UP
EOSINOPHIL # BLD AUTO: 0.18 K/UL — SIGNIFICANT CHANGE UP (ref 0–0.5)
EOSINOPHIL NFR BLD AUTO: 3.1 % — SIGNIFICANT CHANGE UP (ref 0–6)
EPI CELLS # UR: 0 /HPF — SIGNIFICANT CHANGE UP
GAS PNL BLDV: SIGNIFICANT CHANGE UP
GLUCOSE SERPL-MCNC: 78 MG/DL — SIGNIFICANT CHANGE UP (ref 70–99)
GLUCOSE UR QL: NEGATIVE — SIGNIFICANT CHANGE UP
HCT VFR BLD CALC: 43.5 % — SIGNIFICANT CHANGE UP (ref 39–50)
HGB BLD-MCNC: 14.4 G/DL — SIGNIFICANT CHANGE UP (ref 13–17)
HYALINE CASTS # UR AUTO: 0 /LPF — SIGNIFICANT CHANGE UP (ref 0–2)
IMM GRANULOCYTES NFR BLD AUTO: 0.2 % — SIGNIFICANT CHANGE UP (ref 0–1.5)
KETONES UR-MCNC: ABNORMAL
LEUKOCYTE ESTERASE UR-ACNC: NEGATIVE — SIGNIFICANT CHANGE UP
LIDOCAIN IGE QN: 31 U/L — SIGNIFICANT CHANGE UP (ref 7–60)
LYMPHOCYTES # BLD AUTO: 1.9 K/UL — SIGNIFICANT CHANGE UP (ref 1–3.3)
LYMPHOCYTES # BLD AUTO: 33.1 % — SIGNIFICANT CHANGE UP (ref 13–44)
MCHC RBC-ENTMCNC: 30.3 PG — SIGNIFICANT CHANGE UP (ref 27–34)
MCHC RBC-ENTMCNC: 33.1 GM/DL — SIGNIFICANT CHANGE UP (ref 32–36)
MCV RBC AUTO: 91.6 FL — SIGNIFICANT CHANGE UP (ref 80–100)
MONOCYTES # BLD AUTO: 0.57 K/UL — SIGNIFICANT CHANGE UP (ref 0–0.9)
MONOCYTES NFR BLD AUTO: 9.9 % — SIGNIFICANT CHANGE UP (ref 2–14)
NEUTROPHILS # BLD AUTO: 3.02 K/UL — SIGNIFICANT CHANGE UP (ref 1.8–7.4)
NEUTROPHILS NFR BLD AUTO: 52.7 % — SIGNIFICANT CHANGE UP (ref 43–77)
NITRITE UR-MCNC: NEGATIVE — SIGNIFICANT CHANGE UP
NRBC # BLD: 0 /100 WBCS — SIGNIFICANT CHANGE UP (ref 0–0)
PH UR: 7 — SIGNIFICANT CHANGE UP (ref 5–8)
PLATELET # BLD AUTO: 179 K/UL — SIGNIFICANT CHANGE UP (ref 150–400)
POTASSIUM SERPL-MCNC: 4.4 MMOL/L — SIGNIFICANT CHANGE UP (ref 3.5–5.3)
POTASSIUM SERPL-SCNC: 4.4 MMOL/L — SIGNIFICANT CHANGE UP (ref 3.5–5.3)
PROT SERPL-MCNC: 7.9 G/DL — SIGNIFICANT CHANGE UP (ref 6–8.3)
PROT UR-MCNC: ABNORMAL
RBC # BLD: 4.75 M/UL — SIGNIFICANT CHANGE UP (ref 4.2–5.8)
RBC # FLD: 13.9 % — SIGNIFICANT CHANGE UP (ref 10.3–14.5)
RBC CASTS # UR COMP ASSIST: 1 /HPF — SIGNIFICANT CHANGE UP (ref 0–4)
SODIUM SERPL-SCNC: 143 MMOL/L — SIGNIFICANT CHANGE UP (ref 135–145)
SP GR SPEC: >1.05 (ref 1.01–1.02)
UROBILINOGEN FLD QL: NEGATIVE — SIGNIFICANT CHANGE UP
WBC # BLD: 5.74 K/UL — SIGNIFICANT CHANGE UP (ref 3.8–10.5)
WBC # FLD AUTO: 5.74 K/UL — SIGNIFICANT CHANGE UP (ref 3.8–10.5)
WBC UR QL: 1 /HPF — SIGNIFICANT CHANGE UP (ref 0–5)

## 2021-04-28 PROCEDURE — 81001 URINALYSIS AUTO W/SCOPE: CPT

## 2021-04-28 PROCEDURE — 85018 HEMOGLOBIN: CPT

## 2021-04-28 PROCEDURE — 82947 ASSAY GLUCOSE BLOOD QUANT: CPT

## 2021-04-28 PROCEDURE — 87086 URINE CULTURE/COLONY COUNT: CPT

## 2021-04-28 PROCEDURE — 99072 ADDL SUPL MATRL&STAF TM PHE: CPT

## 2021-04-28 PROCEDURE — 99285 EMERGENCY DEPT VISIT HI MDM: CPT

## 2021-04-28 PROCEDURE — 96374 THER/PROPH/DIAG INJ IV PUSH: CPT | Mod: XU

## 2021-04-28 PROCEDURE — 82330 ASSAY OF CALCIUM: CPT

## 2021-04-28 PROCEDURE — 83690 ASSAY OF LIPASE: CPT

## 2021-04-28 PROCEDURE — 83605 ASSAY OF LACTIC ACID: CPT

## 2021-04-28 PROCEDURE — 96375 TX/PRO/DX INJ NEW DRUG ADDON: CPT

## 2021-04-28 PROCEDURE — 74177 CT ABD & PELVIS W/CONTRAST: CPT

## 2021-04-28 PROCEDURE — 80053 COMPREHEN METABOLIC PANEL: CPT

## 2021-04-28 PROCEDURE — 85025 COMPLETE CBC W/AUTO DIFF WBC: CPT

## 2021-04-28 PROCEDURE — 74177 CT ABD & PELVIS W/CONTRAST: CPT | Mod: 26,MA

## 2021-04-28 PROCEDURE — 82803 BLOOD GASES ANY COMBINATION: CPT

## 2021-04-28 PROCEDURE — 82435 ASSAY OF BLOOD CHLORIDE: CPT

## 2021-04-28 PROCEDURE — 99205 OFFICE O/P NEW HI 60 MIN: CPT

## 2021-04-28 PROCEDURE — 84132 ASSAY OF SERUM POTASSIUM: CPT

## 2021-04-28 PROCEDURE — 99284 EMERGENCY DEPT VISIT MOD MDM: CPT | Mod: 25

## 2021-04-28 PROCEDURE — 84295 ASSAY OF SERUM SODIUM: CPT

## 2021-04-28 PROCEDURE — 85014 HEMATOCRIT: CPT

## 2021-04-28 RX ORDER — SODIUM CHLORIDE 9 MG/ML
1000 INJECTION INTRAMUSCULAR; INTRAVENOUS; SUBCUTANEOUS ONCE
Refills: 0 | Status: COMPLETED | OUTPATIENT
Start: 2021-04-28 | End: 2021-04-28

## 2021-04-28 RX ORDER — KETOROLAC TROMETHAMINE 30 MG/ML
15 SYRINGE (ML) INJECTION ONCE
Refills: 0 | Status: DISCONTINUED | OUTPATIENT
Start: 2021-04-28 | End: 2021-04-28

## 2021-04-28 RX ORDER — UMECLIDINIUM BROMIDE AND VILANTEROL TRIFENATATE 62.5; 25 UG/1; UG/1
62.5-25 POWDER RESPIRATORY (INHALATION)
Refills: 0 | Status: DISCONTINUED | COMMUNITY
End: 2021-04-28

## 2021-04-28 RX ORDER — UBIDECARENONE 200 MG
200 CAPSULE ORAL
Qty: 90 | Refills: 0 | Status: DISCONTINUED | OUTPATIENT
Start: 2019-12-12 | End: 2021-04-28

## 2021-04-28 RX ORDER — ONDANSETRON 8 MG/1
4 TABLET, FILM COATED ORAL ONCE
Refills: 0 | Status: COMPLETED | OUTPATIENT
Start: 2021-04-28 | End: 2021-04-28

## 2021-04-28 RX ORDER — ACETAMINOPHEN 500 MG
975 TABLET ORAL ONCE
Refills: 0 | Status: COMPLETED | OUTPATIENT
Start: 2021-04-28 | End: 2021-04-28

## 2021-04-28 RX ORDER — BENZTROPINE MESYLATE 0.5 MG/1
0.5 TABLET ORAL
Refills: 0 | Status: DISCONTINUED | COMMUNITY
End: 2021-04-28

## 2021-04-28 RX ADMIN — Medication 15 MILLIGRAM(S): at 14:00

## 2021-04-28 RX ADMIN — Medication 975 MILLIGRAM(S): at 15:05

## 2021-04-28 RX ADMIN — SODIUM CHLORIDE 1000 MILLILITER(S): 9 INJECTION INTRAMUSCULAR; INTRAVENOUS; SUBCUTANEOUS at 11:48

## 2021-04-28 RX ADMIN — ONDANSETRON 4 MILLIGRAM(S): 8 TABLET, FILM COATED ORAL at 11:48

## 2021-04-28 NOTE — ED PROVIDER NOTE - PATIENT PORTAL LINK FT
You can access the FollowMyHealth Patient Portal offered by [facility] by registering at the following website: [email or web address]. By joining [employer]’s FollowMyHealth portal, you will also be able to view your health information using other applications (apps) compatible with our system.

## 2021-04-28 NOTE — ASSESSMENT
[FreeTextEntry1] : 61-year-old male with a history of multiple episodes of uncomplicated diverticulitis presents complaining of abdominal pain (6/10).  However, on exam he is completely benign, vitals are normal.

## 2021-04-28 NOTE — REVIEW OF SYSTEMS
[As Noted in HPI] : as noted in HPI [Anxiety] : anxiety [Limb Weakness] : limb weakness [Fever] : no fever [Negative] : Psychiatric [FreeTextEntry3] : wears glasses  [FreeTextEntry5] : BERT

## 2021-04-28 NOTE — ED PROVIDER NOTE - PMH
Bipolar Disorder    Hydronephrosis with Renal and Ureteral Calculous Obstruction  at last admission in [facility] ([place]) 2010  Hypothyroidism    MR (mental retardation)    Nephrolithiasis

## 2021-04-28 NOTE — ED ADULT NURSE NOTE - CAS TRG GENERAL NORM CIRC DET
Detail Level: Detailed Other (Free Text): Lesions treated with LN 2 Note Text (......Xxx Chief Complaint.): This diagnosis correlates with the Strong peripheral pulses/Capillary refill less/equal to 2 seconds

## 2021-04-28 NOTE — PHYSICAL EXAM
[Normal Breath Sounds] : Normal breath sounds [Normal Heart Sounds] : normal heart sounds [Normal Rate and Rhythm] : normal rate and rhythm [Alert] : alert [Oriented to Person] : oriented to person [Oriented to Place] : oriented to place [Oriented to Time] : oriented to time [Calm] : calm [Abdomen Masses] : No abdominal masses [Exam Deferred] : exam was deferred [JVD] : no jugular venous distention  [Wheezing] : no wheezing was heard [Purpura] : no purpura  [Petechiae] : no petechiae [Skin Ulcer] : no ulcer [Skin Induration] : no induration [de-identified] : +BS, Benign abdomen [de-identified] : well nourished [de-identified] : NC/AT [de-identified] : wheelchair bound [de-identified] : intact

## 2021-04-28 NOTE — REASON FOR VISIT
[Consultation] : a consultation visit [Formal Caregiver] : formal caregiver [FreeTextEntry1] : intake forms reviewed

## 2021-04-28 NOTE — ED ADULT NURSE NOTE - OBJECTIVE STATEMENT
62 y/o FM, PMH MR, Biploar (Risperdal, Depakote), HLD (Simvastatin), hypothyroid (synthroid), BIBA from GI appointment, pt is a resident at [facility], arrived with Aide from Facility, recently had diverticulitis, was f/u with GI, still c/o LLQ pain so GI sent him into ED for repeat CT. Pt c/o diarrhea, nausea. Denies urinary sx. Safety maintained, Aide at bedside.

## 2021-04-28 NOTE — ED ADULT NURSE NOTE - PMH
Bipolar Disorder    Hydronephrosis with Renal and Ureteral Calculous Obstruction  at last admission in [facility] ([place]) 2010  Hypothyroidism    MR (mental retardation)    Nephrolithiasis

## 2021-04-28 NOTE — ED PROVIDER NOTE - PROGRESS NOTE DETAILS
Carole STONE: Marzena PULIDO at [facility] called: states patient has had increased attention seeking behavior. Had a CT Scan done last year, GI follow up. CT done earlier this year. [phone]. Carole STONE: Marzena NP at [facility] called: states patient has had increased attention seeking behavior and they plan for psych evaluation. He had a CT Scan done last year, GI follow up. CT done earlier this year 4/5 that showed diverticulitis, treated with oral antibiotics. Patient reportedly vomiting but she states no one has witnessed this and they are concerned he has attention seeking behavior. [phone]. Carole STONE: Marzena NP - updated on CT/ lab findings. Patient to be discharged back. Pt tolerating PO, no n/v, no new/worsening pain. - America Gonzalez PA-C

## 2021-04-28 NOTE — ED PROVIDER NOTE - OBJECTIVE STATEMENT
60yo M with PMH MR, bipolar disorder, hypothyroidism, diverticulitis, c.diff, PSH appendectomy, BIBEMS from colorectal surgeon office c/o abdominal pain x weeks. Pt last dx with diverticulitis on 4/5/21 (CT reviewed in sunrise) and is s/p antibiotic course per pateint. Pt presented to surgeon office today, abd exam benign, but c/o persistent abd pain so sent to ED for CT a/p. Provider note from office visit reviewed in HIE. Pt reports LLQ pain, nausea, and nonbloody diarrhea. Denies fever/chills, vomiting, urinary symptoms.   Surgeon Sid

## 2021-04-28 NOTE — ED PROVIDER NOTE - ATTENDING CONTRIBUTION TO CARE
Patient is a 60 yo M with history of MR, bipolar d/o, hypothyroidism, diverticulitis, hx of C. Diff, hx of appendectomy here for evaluation of left lower abdominal pain. Patient reports having this pain since last August. He states he has not been able to eat and vomits with eating. He states he had a diarrhea. LBM today, described as non-bloody. No fevers, chills, chest pain, shortness of breath. He had a CT on 4/5/21 that showed mild diverticulitis. He was on oral antibiotics.     VS noted  Gen. no acute distress, Non toxic   HEENT: EOMI, mmm  Lungs: CTAB/L no C/ W /R   CVS: RRR   Abd; Soft. ttp in LLQ, no rebound or guarding  Ext: no edema  Skin: no rash  Neuro AAOx3 non focal clear speech  a/p: abd pain - hx of diverticulitis - plan for CT A/P, labs, u/a and reassess.   - Carole STONE

## 2021-04-28 NOTE — ED PROVIDER NOTE - NSFOLLOWUPINSTRUCTIONS_ED_ALL_ED_FT
Stay well hydrated. Eat a bland diet.   Avoid fatty/fried/spicy/acidic foods.   Advance diet as tolerated and eat small frequent meals.     Follow up with your primary care provider and gastroenterologist upon discharge.    Follow up with your colorectal surgeon as well.     Bring copy of your printed results with you.     Return to ER for fever, new/worsening abdominal pain, vomiting, persistent or bloody diarrhea, inability to tolerate food/fluid, chest pain, shortness of breath, or any other concerning symptoms.     --------------------------------    Abdominal Pain    AMBULATORY CARE:    Abdominal pain can be dull, achy, or sharp. You may have pain in one area of your abdomen, or in your entire abdomen. Your pain may be caused by a condition such as constipation, food sensitivity or poisoning, infection, or a blockage. Abdominal pain can also be from a hernia, appendicitis, or an ulcer. Liver, gallbladder, or kidney conditions can also cause abdominal pain. The cause of your abdominal pain may be unknown.    Seek care immediately if:   •You have new chest pain or shortness of breath.    •You have pulsing pain in your upper abdomen or lower back that suddenly becomes constant.    •Your pain is in the right lower abdominal area and worsens with movement.     •You have a fever over 100.4°F (38°C) or shaking chills.     •You are vomiting and cannot keep food or liquids down.     •Your pain does not improve or gets worse over the next 8 to 12 hours.     •You see blood in your vomit or bowel movements, or they look black and tarry.     •Your skin or the whites of your eyes turn yellow.     •You are a woman and have a large amount of vaginal bleeding that is not your monthly period.     Contact your healthcare provider if:   •You have pain in your lower back.   •You are a man and have pain in your testicles.  •You have pain when you urinate.   •You have questions or concerns about your condition or care.    Treatment for abdominal pain may include medicine to calm your stomach, prevent vomiting, or decrease pain.    Follow up with your healthcare provider as directed: Write down your questions so you remember to ask them during your visits.

## 2021-04-28 NOTE — HISTORY OF PRESENT ILLNESS
[FreeTextEntry1] : Patient is 60 yo male, here for consultation for diverticulitis. Patient had 3-4 episodes of diverticulitis that were treated with antibiotics. He initially went to the ER at [facility] for LLQ pain in Sept 2020, had CT confirmed diverticulitis. Patient states his last course of abx was a month ago. Abdominal CT on 4/5/21 showed mild diverticulitis of proximal sigmoid. He continues to have lower abdominal pain, he had regular BM with loose stools and intermittent diarrhea, weight loss of ~10 llbs in the last year. Last colonoscopy was in 2018. He has hx of c.diff.

## 2021-04-29 LAB
CULTURE RESULTS: SIGNIFICANT CHANGE UP
SPECIMEN SOURCE: SIGNIFICANT CHANGE UP

## 2021-05-03 ENCOUNTER — EMERGENCY (EMERGENCY)
Facility: HOSPITAL | Age: 61
LOS: 1 days | Discharge: HOME | End: 2021-05-03
Attending: EMERGENCY MEDICINE
Payer: MEDICARE

## 2021-05-03 VITALS
HEIGHT: 71 IN | TEMPERATURE: 98 F | OXYGEN SATURATION: 99 % | RESPIRATION RATE: 18 BRPM | HEART RATE: 59 BPM | SYSTOLIC BLOOD PRESSURE: 125 MMHG | WEIGHT: 179.9 LBS | DIASTOLIC BLOOD PRESSURE: 76 MMHG

## 2021-05-03 VITALS
SYSTOLIC BLOOD PRESSURE: 115 MMHG | OXYGEN SATURATION: 98 % | HEART RATE: 60 BPM | RESPIRATION RATE: 18 BRPM | DIASTOLIC BLOOD PRESSURE: 68 MMHG

## 2021-05-03 LAB
ALBUMIN SERPL ELPH-MCNC: 4.3 G/DL — SIGNIFICANT CHANGE UP (ref 3.3–5)
ALP SERPL-CCNC: 36 U/L — LOW (ref 40–120)
ALT FLD-CCNC: 16 U/L — SIGNIFICANT CHANGE UP (ref 10–45)
ANION GAP SERPL CALC-SCNC: 13 MMOL/L — SIGNIFICANT CHANGE UP (ref 5–17)
APPEARANCE UR: CLEAR — SIGNIFICANT CHANGE UP
APTT BLD: 30 SEC — SIGNIFICANT CHANGE UP (ref 27.5–35.5)
AST SERPL-CCNC: 25 U/L — SIGNIFICANT CHANGE UP (ref 10–40)
BACTERIA # UR AUTO: NEGATIVE — SIGNIFICANT CHANGE UP
BASOPHILS # BLD AUTO: 0.04 K/UL — SIGNIFICANT CHANGE UP (ref 0–0.2)
BASOPHILS NFR BLD AUTO: 0.8 % — SIGNIFICANT CHANGE UP (ref 0–2)
BILIRUB SERPL-MCNC: 0.3 MG/DL — SIGNIFICANT CHANGE UP (ref 0.2–1.2)
BILIRUB UR-MCNC: NEGATIVE — SIGNIFICANT CHANGE UP
BUN SERPL-MCNC: 21 MG/DL — SIGNIFICANT CHANGE UP (ref 7–23)
CALCIUM SERPL-MCNC: 9.4 MG/DL — SIGNIFICANT CHANGE UP (ref 8.4–10.5)
CHLORIDE SERPL-SCNC: 100 MMOL/L — SIGNIFICANT CHANGE UP (ref 96–108)
CO2 SERPL-SCNC: 26 MMOL/L — SIGNIFICANT CHANGE UP (ref 22–31)
COLOR SPEC: SIGNIFICANT CHANGE UP
CREAT SERPL-MCNC: 0.9 MG/DL — SIGNIFICANT CHANGE UP (ref 0.5–1.3)
DIFF PNL FLD: NEGATIVE — SIGNIFICANT CHANGE UP
EOSINOPHIL # BLD AUTO: 0.08 K/UL — SIGNIFICANT CHANGE UP (ref 0–0.5)
EOSINOPHIL NFR BLD AUTO: 1.6 % — SIGNIFICANT CHANGE UP (ref 0–6)
EPI CELLS # UR: 0 /HPF — SIGNIFICANT CHANGE UP
GAS PNL BLDV: SIGNIFICANT CHANGE UP
GLUCOSE SERPL-MCNC: 89 MG/DL — SIGNIFICANT CHANGE UP (ref 70–99)
GLUCOSE UR QL: NEGATIVE — SIGNIFICANT CHANGE UP
HCT VFR BLD CALC: 38.5 % — LOW (ref 39–50)
HGB BLD-MCNC: 12.4 G/DL — LOW (ref 13–17)
HYALINE CASTS # UR AUTO: 0 /LPF — SIGNIFICANT CHANGE UP (ref 0–2)
IMM GRANULOCYTES NFR BLD AUTO: 0.2 % — SIGNIFICANT CHANGE UP (ref 0–1.5)
INR BLD: 1.1 RATIO — SIGNIFICANT CHANGE UP (ref 0.88–1.16)
KETONES UR-MCNC: ABNORMAL
LEUKOCYTE ESTERASE UR-ACNC: NEGATIVE — SIGNIFICANT CHANGE UP
LYMPHOCYTES # BLD AUTO: 1.77 K/UL — SIGNIFICANT CHANGE UP (ref 1–3.3)
LYMPHOCYTES # BLD AUTO: 35.8 % — SIGNIFICANT CHANGE UP (ref 13–44)
MCHC RBC-ENTMCNC: 29.5 PG — SIGNIFICANT CHANGE UP (ref 27–34)
MCHC RBC-ENTMCNC: 32.2 GM/DL — SIGNIFICANT CHANGE UP (ref 32–36)
MCV RBC AUTO: 91.4 FL — SIGNIFICANT CHANGE UP (ref 80–100)
MONOCYTES # BLD AUTO: 0.62 K/UL — SIGNIFICANT CHANGE UP (ref 0–0.9)
MONOCYTES NFR BLD AUTO: 12.5 % — SIGNIFICANT CHANGE UP (ref 2–14)
NEUTROPHILS # BLD AUTO: 2.43 K/UL — SIGNIFICANT CHANGE UP (ref 1.8–7.4)
NEUTROPHILS NFR BLD AUTO: 49.1 % — SIGNIFICANT CHANGE UP (ref 43–77)
NITRITE UR-MCNC: NEGATIVE — SIGNIFICANT CHANGE UP
NRBC # BLD: 0 /100 WBCS — SIGNIFICANT CHANGE UP (ref 0–0)
PH UR: 6.5 — SIGNIFICANT CHANGE UP (ref 5–8)
PLATELET # BLD AUTO: 151 K/UL — SIGNIFICANT CHANGE UP (ref 150–400)
POTASSIUM SERPL-MCNC: 3.7 MMOL/L — SIGNIFICANT CHANGE UP (ref 3.5–5.3)
POTASSIUM SERPL-SCNC: 3.7 MMOL/L — SIGNIFICANT CHANGE UP (ref 3.5–5.3)
PROT SERPL-MCNC: 7.2 G/DL — SIGNIFICANT CHANGE UP (ref 6–8.3)
PROT UR-MCNC: NEGATIVE — SIGNIFICANT CHANGE UP
PROTHROM AB SERPL-ACNC: 13.1 SEC — SIGNIFICANT CHANGE UP (ref 10.6–13.6)
RBC # BLD: 4.21 M/UL — SIGNIFICANT CHANGE UP (ref 4.2–5.8)
RBC # FLD: 14 % — SIGNIFICANT CHANGE UP (ref 10.3–14.5)
RBC CASTS # UR COMP ASSIST: 1 /HPF — SIGNIFICANT CHANGE UP (ref 0–4)
SODIUM SERPL-SCNC: 139 MMOL/L — SIGNIFICANT CHANGE UP (ref 135–145)
SP GR SPEC: 1.01 — SIGNIFICANT CHANGE UP (ref 1.01–1.02)
UROBILINOGEN FLD QL: NEGATIVE — SIGNIFICANT CHANGE UP
WBC # BLD: 4.95 K/UL — SIGNIFICANT CHANGE UP (ref 3.8–10.5)
WBC # FLD AUTO: 4.95 K/UL — SIGNIFICANT CHANGE UP (ref 3.8–10.5)
WBC UR QL: 0 /HPF — SIGNIFICANT CHANGE UP (ref 0–5)

## 2021-05-03 PROCEDURE — 85730 THROMBOPLASTIN TIME PARTIAL: CPT

## 2021-05-03 PROCEDURE — 85610 PROTHROMBIN TIME: CPT

## 2021-05-03 PROCEDURE — 81001 URINALYSIS AUTO W/SCOPE: CPT

## 2021-05-03 PROCEDURE — 85018 HEMOGLOBIN: CPT

## 2021-05-03 PROCEDURE — 82330 ASSAY OF CALCIUM: CPT

## 2021-05-03 PROCEDURE — 80053 COMPREHEN METABOLIC PANEL: CPT

## 2021-05-03 PROCEDURE — 82435 ASSAY OF BLOOD CHLORIDE: CPT

## 2021-05-03 PROCEDURE — 85025 COMPLETE CBC W/AUTO DIFF WBC: CPT

## 2021-05-03 PROCEDURE — 84132 ASSAY OF SERUM POTASSIUM: CPT

## 2021-05-03 PROCEDURE — 99284 EMERGENCY DEPT VISIT MOD MDM: CPT

## 2021-05-03 PROCEDURE — 82803 BLOOD GASES ANY COMBINATION: CPT

## 2021-05-03 PROCEDURE — 82947 ASSAY GLUCOSE BLOOD QUANT: CPT

## 2021-05-03 PROCEDURE — 99284 EMERGENCY DEPT VISIT MOD MDM: CPT | Mod: 25

## 2021-05-03 PROCEDURE — 96374 THER/PROPH/DIAG INJ IV PUSH: CPT

## 2021-05-03 PROCEDURE — 83690 ASSAY OF LIPASE: CPT

## 2021-05-03 PROCEDURE — 84295 ASSAY OF SERUM SODIUM: CPT

## 2021-05-03 PROCEDURE — 87086 URINE CULTURE/COLONY COUNT: CPT

## 2021-05-03 PROCEDURE — 83605 ASSAY OF LACTIC ACID: CPT

## 2021-05-03 PROCEDURE — 85014 HEMATOCRIT: CPT

## 2021-05-03 RX ORDER — SODIUM CHLORIDE 9 MG/ML
1000 INJECTION INTRAMUSCULAR; INTRAVENOUS; SUBCUTANEOUS ONCE
Refills: 0 | Status: COMPLETED | OUTPATIENT
Start: 2021-05-03 | End: 2021-05-03

## 2021-05-03 RX ORDER — KETOROLAC TROMETHAMINE 30 MG/ML
15 SYRINGE (ML) INJECTION ONCE
Refills: 0 | Status: DISCONTINUED | OUTPATIENT
Start: 2021-05-03 | End: 2021-05-03

## 2021-05-03 RX ADMIN — Medication 15 MILLIGRAM(S): at 19:57

## 2021-05-03 RX ADMIN — SODIUM CHLORIDE 1000 MILLILITER(S): 9 INJECTION INTRAMUSCULAR; INTRAVENOUS; SUBCUTANEOUS at 18:02

## 2021-05-03 NOTE — ED PROVIDER NOTE - NS ED ROS FT
CONSTITUTIONAL: (+) chills, No fevers  EYES/ENT: No visual changes;  No vertigo or throat pain   NECK: No pain or stiffness  RESPIRATORY: No cough, wheezing, hemoptysis; No shortness of breath  CARDIOVASCULAR: No chest pain or palpitations  GASTROINTESTINAL: (+) abdominal pain. (+) nausea, (+) vomiting, (+) diarrhea. No melena or hematochezia.  GENITOURINARY: No dysuria, frequency or hematuria  NEUROLOGICAL: No numbness. (+) weakness  SKIN: No itching, rashes

## 2021-05-03 NOTE — ED PROVIDER NOTE - CLINICAL SUMMARY MEDICAL DECISION MAKING FREE TEXT BOX
Dean Christopher (PGY1): 62yo M with PMH MR, bipolar disorder, hypothyroidism, diverticulitis, c.diff, PSH appendectomy, BIBEMS from [facility] c/o LUQ and LLQ abdominal pain w/ N/V and loose stools/diarrhea x3 days. Pt w/ h/o diverticulitis s/p antibiotic course; recent repeat CT A/P 4/28 w/ revealing Colonic diverticulosis but no definite diverticulitis. DDx includes gastroenteritis, diverticulitis, pancreatitis. Will get CBC, CMP, Lipase, UA, UC, VBG

## 2021-05-03 NOTE — ED ADULT NURSE NOTE - OBJECTIVE STATEMENT
62 yo M with pmhx of Parkinson's Disorder, Hypothyroidism, mental retardation 62 yo M from [facility] with pmhx of Parkinson's Disorder, Hypothyroidism, mental retardation presents with generalized abdominal pain with nausea and vomiting. Abd is soft, nondistended. reports tenderness to palpation. Pt denies chest pain, shortness of breath, headache, nausea, vomiting, diarrhea, abdominal pain, fevers, chills, urinary symptoms, hematuria, bloody stool, falls, traumas. VSS on arrival. Pt appears to be in NAD at this time.

## 2021-05-03 NOTE — ED PROVIDER NOTE - PMH
Bipolar Disorder    Hydronephrosis with Renal and Ureteral Calculous Obstruction  at last admission in [facility] ([place]) 2010  Hypothyroidism    MR (mental retardation)    Nephrolithiasis

## 2021-05-03 NOTE — ED ADULT NURSE REASSESSMENT NOTE - NS ED NURSE REASSESS COMMENT FT1
Pt was straight catheterized using sterile techniques with 2 Rns at bedside. Pt tolerated procedure well. 400 mL yellow and clear urine noted

## 2021-05-03 NOTE — ED ADULT NURSE NOTE - NSIMPLEMENTINTERV_GEN_ALL_ED
Implemented All Fall with Harm Risk Interventions:  [place] to call system. Call bell, personal items and telephone within reach. Instruct patient to call for assistance. Room bathroom lighting operational. Non-slip footwear when patient is off stretcher. Physically safe environment: no spills, clutter or unnecessary equipment. Stretcher in lowest position, wheels locked, appropriate side rails in place. Provide visual cue, wrist band, yellow gown, etc. Monitor gait and stability. Monitor for mental status changes and reorient to person, place, and time. Review medications for side effects contributing to fall risk. Reinforce activity limits and safety measures with patient and family. Provide visual clues: red socks.

## 2021-05-03 NOTE — ED PROVIDER NOTE - PHYSICAL EXAMINATION
GENERAL: NAD, lying in bed comfortably, flat affect  HEAD:  Atraumatic, normocephalic  EYES: EOMI, PERRLA, conjunctiva and sclera clear  ENT: Moist mucous membranes  NECK: Supple, no JVD  HEART: Regular rate and rhythm, no murmurs, rubs, or gallops  LUNGS: Unlabored respirations.  Clear to auscultation bilaterally, no crackles, wheezing, or rhonchi  ABDOMEN: Soft, nondistended, +BS in all 4 quadrants. + mild ttp diffusely, mainly in LUQ and LLQ without rebound/guarding  EXTREMITIES: 2+ peripheral pulses bilaterally. No clubbing, cyanosis, or edema  NERVOUS SYSTEM:  A&Ox3, L sided UE and LE weakness, chronic  SKIN: No rashes or lesions

## 2021-05-03 NOTE — ED PROVIDER NOTE - PATIENT PORTAL LINK FT
You can access the FollowMyHealth Patient Portal offered by [facility] by registering at the following website: [email or web address]. By joining [facility]’s FollowMyHealth portal, you will also be able to view your health information using other applications (apps) compatible with our system.

## 2021-05-03 NOTE — ED PROVIDER NOTE - OBJECTIVE STATEMENT
62yo M with PMH MR, bipolar disorder, hypothyroidism, diverticulitis, c.diff, PSH appendectomy, BIBEMS from [facility] c/o abdominal pain and N/V x3 days. Pt last dx with diverticulitis on 4/5/21 (CT reviewed in [facility]) and is s/p antibiotic course; recent presentation to the ED on 4/28 for similar abdominal pain w/ repeat CT A/P revealing Colonic diverticulosis. No definite diverticulitis. Pt reports LLQ pain, nausea, and nonbloody diarrhea. Denies fever, CP, SOB, urinary symptoms. Pt notes he has chronic left sided weakness   Surgeon Sid 60yo M with PMH MR, bipolar disorder, hypothyroidism, diverticulitis, c.diff, PSH appendectomy, BIBEMS from [facility] c/o LUQ and LLQ abdominal pain w/ associated N/V x3 days. Also reports loose stools/diarrhea for the last couple of days, non bloody. Pt last dx with diverticulitis on 4/5/21 (CT reviewed in [facility]) and is s/p antibiotic course; recent presentation to the ED on 4/28 for similar abdominal pain w/ repeat CT A/P revealing Colonic diverticulosis but no definite diverticulitis. Denies fever, CP, SOB, urinary symptoms. Pt notes he has chronic left sided weakness

## 2021-05-03 NOTE — ED PROVIDER NOTE - PROGRESS NOTE DETAILS
Attending MD Leon: No persistent emesis or loose stools here in the ED.  po challenge Kiet, PGY-1  Patient tolerated PO with no increase in abdominal pain or emesis

## 2021-05-03 NOTE — ED PROVIDER NOTE - ATTENDING CONTRIBUTION TO CARE
Attending MD Leon: I personally have seen and examined this patient.  Resident note reviewed and agree on plan of care and except where noted.  See below for details.     Seen in [facility]    61M with PMH/PSH including NPH (s/p drainage twice), chronic migraine headaches, hypothyroidism, bipolar disorder, developmental delay, GERD, BPH, HLD, s/p appendectomy (1996) presents to the ED brought in by EMS from [facility] with left sided abdominal pain.  Denies radiation of pain.  Reports was here recently for similar pain.  Reports has been having loose stools, reports brown, no black or bloody.  Reports nausea, emesis of gastric content (reports orange with some white stuff), nonbloody, nonbilious.  Denies chest pain, shortness of breath, palpitations. Denies fevers, chills.  Denies dysuria, hematuria.  Denies cough, COVID.  A ten (10) point review of systems was negative other than as stated in the HPI or elsewhere in the chart.     Exam:   General: NAD, AAOx3, dysarthria  HENT: head NCAT, airway patent  Eyes: anicteric  Lungs: lungs CTAB with good inspiratory effort, no wheezing, no rhonchi, no rales  Cardiac: +S1S2, no m/r/g  GI: abdomen soft with +BS, NT including in area indicated with pain, no rebound, no guarding, ND  : no CVAT  MSK: ranging neck freely, L extremities stronger than R  Neuro: baseline LE weakness, L>R  Psych: normal mood and affect     A/P: 61M with L abdominal pain, recent visit EMR revealed, labs and CT nonactionable, here with benign exam, given CT on 4/28/21 will defer at this time, will obtain labs to eval for metabolic derangement, uptrending WBCs, UA for eval for UTI, patient afebrile here, will need GI follow up

## 2021-05-03 NOTE — ED ADULT NURSE NOTE - PMH
Bipolar Disorder    Hydronephrosis with Renal and Ureteral Calculous Obstruction  at last admission in [facility]) 2010  Hypothyroidism    MR (mental retardation)    Nephrolithiasis

## 2021-05-05 LAB
CULTURE RESULTS: NO GROWTH — SIGNIFICANT CHANGE UP
SPECIMEN SOURCE: SIGNIFICANT CHANGE UP

## 2021-05-19 ENCOUNTER — APPOINTMENT (OUTPATIENT)
Dept: NEUROLOGY | Facility: CLINIC | Age: 61
End: 2021-05-19
Payer: MEDICARE

## 2021-05-19 VITALS
HEIGHT: 71 IN | SYSTOLIC BLOOD PRESSURE: 118 MMHG | OXYGEN SATURATION: 98 % | BODY MASS INDEX: 27.3 KG/M2 | TEMPERATURE: 97.9 F | DIASTOLIC BLOOD PRESSURE: 64 MMHG | HEART RATE: 79 BPM | WEIGHT: 195 LBS

## 2021-05-19 DIAGNOSIS — G81.94 HEMIPLEGIA, UNSPECIFIED AFFECTING LEFT NONDOMINANT SIDE: ICD-10-CM

## 2021-05-19 DIAGNOSIS — M54.2 CERVICALGIA: ICD-10-CM

## 2021-05-19 DIAGNOSIS — R44.9 UNSPECIFIED SYMPTOMS AND SIGNS INVOLVING GENERAL SENSATIONS AND PERCEPTIONS: ICD-10-CM

## 2021-05-19 PROCEDURE — 99215 OFFICE O/P EST HI 40 MIN: CPT

## 2021-05-19 PROCEDURE — 99072 ADDL SUPL MATRL&STAF TM PHE: CPT

## 2021-05-25 ENCOUNTER — APPOINTMENT (OUTPATIENT)
Dept: CT IMAGING | Facility: CLINIC | Age: 61
End: 2021-05-25

## 2021-06-09 ENCOUNTER — APPOINTMENT (OUTPATIENT)
Dept: MRI IMAGING | Facility: CLINIC | Age: 61
End: 2021-06-09

## 2021-06-18 RX ORDER — METRONIDAZOLE 500 MG/1
TABLET, FILM COATED ORAL
Refills: 0 | Status: DISCONTINUED | COMMUNITY
End: 2021-06-18

## 2021-06-18 RX ORDER — MAGNESIUM OXIDE 241.3 MG/1000MG
400 TABLET ORAL DAILY
Qty: 90 | Refills: 0 | Status: DISCONTINUED | OUTPATIENT
Start: 2019-12-12 | End: 2021-06-18

## 2021-06-18 RX ORDER — TAMSULOSIN HYDROCHLORIDE 0.4 MG/1
0.4 CAPSULE ORAL
Refills: 0 | Status: DISCONTINUED | COMMUNITY
Start: 2018-12-06 | End: 2021-06-18

## 2021-06-18 RX ORDER — DIVALPROEX SODIUM 250 MG/1
250 TABLET, DELAYED RELEASE ORAL 3 TIMES DAILY
Refills: 0 | Status: DISCONTINUED | COMMUNITY
Start: 2018-08-28 | End: 2021-06-18

## 2021-06-18 RX ORDER — L.ACIDOPH/L.BULG/B.BIF/S.THERM 1B-250 MG
TABLET ORAL
Refills: 0 | Status: DISCONTINUED | COMMUNITY
End: 2021-06-18

## 2021-06-18 RX ORDER — FINASTERIDE 5 MG/1
5 TABLET, FILM COATED ORAL DAILY
Refills: 0 | Status: ACTIVE | COMMUNITY
Start: 2018-12-06

## 2021-06-18 RX ORDER — CHOLECALCIFEROL (VITAMIN D3) 1250 MCG
1.25 MG CAPSULE ORAL
Refills: 0 | Status: DISCONTINUED | COMMUNITY
Start: 2018-12-06 | End: 2021-06-18

## 2021-06-18 RX ORDER — CLONAZEPAM 0.5 MG/1
0.5 TABLET ORAL
Refills: 0 | Status: ACTIVE | COMMUNITY

## 2021-06-18 RX ORDER — CIPROFLOXACIN HYDROCHLORIDE 500 MG/1
TABLET, FILM COATED ORAL
Refills: 0 | Status: DISCONTINUED | COMMUNITY
End: 2021-06-18

## 2021-06-18 RX ORDER — RIBOFLAVIN (VITAMIN B2) 400 MG
400 TABLET ORAL DAILY
Qty: 90 | Refills: 0 | Status: DISCONTINUED | OUTPATIENT
Start: 2019-12-12 | End: 2021-06-18

## 2021-06-18 RX ORDER — SIMETHICONE 80 MG/1
TABLET, CHEWABLE ORAL
Refills: 0 | Status: DISCONTINUED | COMMUNITY
End: 2021-06-18

## 2021-06-18 RX ORDER — DIVALPROEX SODIUM 500 MG/1
500 TABLET, DELAYED RELEASE ORAL AT BEDTIME
Refills: 0 | Status: ACTIVE | COMMUNITY
Start: 2018-08-28

## 2021-06-18 RX ORDER — B-COMPLEX WITH VITAMIN C
500-200 TABLET ORAL
Refills: 0 | Status: DISCONTINUED | COMMUNITY
Start: 2018-12-06 | End: 2021-06-18

## 2021-06-18 NOTE — DATA REVIEWED
[de-identified] : September 17, 2019 (with and without contrast):\par - No acute intracranial abnormalities\par - Stable ventricular size\par - Paranasal sinus opacification [de-identified] : MRI Cervical Spine with and without contrast (9/17/19):\par - Multilevel lower disc bulges\par - C6/C7 central disc protrusion\par \par CT Head (10/10/19): No acute intracranial abnormality, Stable ventricular size\par \par CBC & CMP (10/10/19): Both normal\par Lipid panel (10/11/19): Notable for triglyceride 225 <H> & HDL 36 <L>\par Valproic acid level (10/11/19): 43 <L>\par CK (10/11/19): 113 (WNL)

## 2021-06-18 NOTE — HISTORY OF PRESENT ILLNESS
[FreeTextEntry1] : FROM 12/12/19:\par This is a 59-year-old right-handed man, currently a resident at [facility], who has previously been followed in the Neurology resident clinic for drug-induced parkinsonism, last seen on August 1, 2019. He states that he has had at least two falls since that clinic visit, leading to admissions at [facility]. Since his most recent discharge in September 2019, he has been using a wheelchair and has not been using a walker as before. He finds that whenever he has attempted to stand, he has had tremors in both of his legs. However, when he is seated or at rest, he rarely has any tremors. The patient also states that for at least the last month, he has been experiencing daily pressure-like headaches throughout his head, without radiation and without identifiable triggers or relieving factors.\par \par FROM 5/19/21:\par The patient, now 61, states that last week, he fell backwards while in the bathroom and hit the back of his neck and shoulders, without loss of consciousness. Since then, he has been having soreness in his neck, radiating to both of his shoulders and his arms, on a daily basis. He reports having new numbness in his left arm and left lower back. He has occasional tremors of the hands. He has not had a DaTScan done to evaluate for idiopathic Parkinson's disease, as I had recommended in the last clinic visit.

## 2021-06-18 NOTE — PHYSICAL EXAM
[General Appearance - Alert] : alert [Oriented To Time, Place, And Person] : oriented to person, place, and time [Person] : oriented to person [Place] : oriented to place [Time] : oriented to time [Visual Intact] : visual attention was ~T not ~L decreased [Fluency] : fluency intact [Cranial Nerves Optic (II)] : visual acuity intact bilaterally,  visual fields full to confrontation, pupils equal round and reactive to light [Cranial Nerves Oculomotor (III)] : extraocular motion intact [Cranial Nerves Trigeminal (V)] : facial sensation intact symmetrically [Cranial Nerves Facial (VII)] : face symmetrical [Cranial Nerves Vestibulocochlear (VIII)] : hearing was intact bilaterally [Cranial Nerves Glossopharyngeal (IX)] : tongue and palate midline [Cranial Nerves Accessory (XI - Cranial And Spinal)] : head turning and shoulder shrug symmetric [Cranial Nerves Hypoglossal (XII)] : there was no tongue deviation with protrusion [Motor Handedness Right-Handed] : the patient is right hand dominant [2+] : Biceps left 2+ [Sclera] : the sclera and conjunctiva were normal [PERRL With Normal Accommodation] : pupils were equal in size, round, reactive to light, with normal accommodation [Extraocular Movements] : extraocular movements were intact [Outer Ear] : the ears and nose were normal in appearance [Oropharynx] : the oropharynx was normal [Auscultation Breath Sounds / Voice Sounds] : lungs were clear to auscultation bilaterally [Heart Rate And Rhythm] : heart rate was normal and rhythm regular [Heart Sounds] : normal S1 and S2 [Arterial Pulses Carotid] : carotid pulses were normal with no bruits [Full Pulse] : the pedal pulses are present [Abdomen Soft] : soft [Abdomen Tenderness] : non-tender [No CVA Tenderness] : no ~M costovertebral angle tenderness [Nail Clubbing] : no clubbing  or cyanosis of the fingernails [Skin Color & Pigmentation] : normal skin color and pigmentation [Skin Turgor] : normal skin turgor [] : no rash [Comprehension] : comprehension intact [Paresis Pronator Drift Left-Sided] : a left-sided pronator drift was present [3] : biceps 3/5 [4] : triceps 4/5 [Hand Weakness Left] : the hand  was weak [1+] : Patella left 1+ [Tactile Decrease Entire Left Arm] : diminished left arm [Pain / Temperature Decrease Entire Arm Left] : diminished left arm [Paresis Pronator Drift Right-Sided] : no pronator drift on the right [Motor Strength Upper Extremities Bilaterally] : strength was normal in both upper extremities [Hand Weakness Right] : normal hand  [Tactile Decrease Shoulders Right] : normal right shoulder [Tactile Decrease Shoulders Left] : normal left shoulder [Tactile Decrease Entire Right Arm] : normal right arm [Tactile Decrease Entire Left Side Of Face] : normal left side of the face [Tactile Decrease Entire Right Side Of Face] : normal right side of the face [Tactile Decrease Hand] : normal right hand [Tactile Decrease Neck Right Side] : normal right side of the neck [Tactile Decrease Neck Left Side] : normal left side of the neck [Tactile Decrease Entire Leg Right] : normal right leg [Tactile Decrease Entire Leg Left] : normal left leg [Pain / Temperature Decrease Shoulders Right Only] : normal right shoulder [Pain / Temperature Decrease Shoulders Left Only] : normal left shoulder [Pain / Temperature Decrease Entire Arm Right] : normal right arm [Pain / Temperature Decrease Face Right Side] : normal right side of the face [Pain / Temperature Decrease Face Left Side] : normal left side of the face [Pain / Temp Decrease Hand] : normal right hand [Pain / Temperature Decrease Neck Left Side] : normal left side of the neck [Pain / Temp Decrease Entire Leg - Left] : normal left leg [Pain / Temp Decrease Entire Leg - Right] : normal right leg [Pain / Temperature Decrease Neck Right Side] : normal right side of the neck [Past-pointing] : there was no past-pointing [Coordination - Dysmetria Impaired Finger-to-Nose Bilateral] : not present [Tremor] : no tremor present [Coordination - Dysmetria Impaired Heel-to-Shin Bilateral] : not present [Plantar Reflex Right Only] : normal on the right [Plantar Reflex Left Only] : normal on the left [___] : absent on the right [___] : absent on the left [FreeTextEntry7] : Diminished sensation to light touch and pinprick at left lower back [FreeTextEntry4] : Mildly dysarthric speech [FreeTextEntry8] : Wide-based stance. Unable to ambulate or perform Romberg testing or specialized gaits. [FreeTextEntry1] : Tenderness to palpation at mid-upper back

## 2021-06-18 NOTE — ASSESSMENT
[FreeTextEntry1] : 59 RHM with concern for drug-induced parkinsonism and previous chronic daily headaches, now with persistent cervicalgia and upper back pain and left-sided hemiparesis and left arm sensory deficit following fall with trauma to the neck and upper back, concerning for brain or spinal cord injury.

## 2021-06-30 ENCOUNTER — APPOINTMENT (OUTPATIENT)
Dept: NUCLEAR MEDICINE | Facility: IMAGING CENTER | Age: 61
End: 2021-06-30

## 2021-07-05 ENCOUNTER — APPOINTMENT (OUTPATIENT)
Dept: MRI IMAGING | Facility: IMAGING CENTER | Age: 61
End: 2021-07-05
Payer: MEDICARE

## 2021-07-05 ENCOUNTER — OUTPATIENT (OUTPATIENT)
Dept: OUTPATIENT SERVICES | Facility: HOSPITAL | Age: 61
LOS: 1 days | End: 2021-07-05
Payer: MEDICARE

## 2021-07-05 DIAGNOSIS — G20 PARKINSON'S DISEASE: ICD-10-CM

## 2021-07-05 DIAGNOSIS — M54.2 CERVICALGIA: ICD-10-CM

## 2021-07-05 DIAGNOSIS — R44.9 UNSPECIFIED SYMPTOMS AND SIGNS INVOLVING GENERAL SENSATIONS AND PERCEPTIONS: ICD-10-CM

## 2021-07-05 DIAGNOSIS — G81.94 HEMIPLEGIA, UNSPECIFIED AFFECTING LEFT NONDOMINANT SIDE: ICD-10-CM

## 2021-07-05 PROCEDURE — 70551 MRI BRAIN STEM W/O DYE: CPT

## 2021-07-05 PROCEDURE — 72141 MRI NECK SPINE W/O DYE: CPT

## 2021-07-05 PROCEDURE — 72141 MRI NECK SPINE W/O DYE: CPT | Mod: 26

## 2021-07-05 PROCEDURE — 70551 MRI BRAIN STEM W/O DYE: CPT | Mod: 26

## 2021-07-27 ENCOUNTER — APPOINTMENT (OUTPATIENT)
Dept: NEUROLOGY | Facility: CLINIC | Age: 61
End: 2021-07-27
Payer: MEDICARE

## 2021-07-27 ENCOUNTER — NON-APPOINTMENT (OUTPATIENT)
Age: 61
End: 2021-07-27

## 2021-07-27 VITALS
WEIGHT: 195 LBS | DIASTOLIC BLOOD PRESSURE: 72 MMHG | HEART RATE: 66 BPM | BODY MASS INDEX: 27.3 KG/M2 | SYSTOLIC BLOOD PRESSURE: 112 MMHG | HEIGHT: 71 IN

## 2021-07-27 PROCEDURE — 99215 OFFICE O/P EST HI 40 MIN: CPT

## 2021-07-27 PROCEDURE — G2212 PROLONG OUTPT/OFFICE VIS: CPT

## 2021-07-27 NOTE — ASSESSMENT
[FreeTextEntry1] : 61 RHM with concern for drug-induced parkinsonism and previous chronic daily headaches, now with persistent cervicalgia and upper back pain and left-sided hemiparesis and left arm sensory deficit following fall with trauma to the neck and upper back, concerning for brain or spinal cord injury; also with mild cognitive impairment with memory loss.

## 2021-07-27 NOTE — DATA REVIEWED
[de-identified] : MRI Brain w/wo Gadolinium (9/17/19):\par - No acute intracranial abnormalities\par - Stable ventricular size\par - Paranasal sinus opacification\par \par MRI Brain / Cervical Spine (7/5/21):\par - No acute intracranial abnormalities\par - Mild diffuse atrophy\par - Incidental sinus disease at b/l ethmoid, left frontal, and left sphenoid sinuses\par - Mild cervical spondylosis with progression of spinal canal stenosis, most severe at C6/C7\par - No acute cervical fracture or subluxation [de-identified] : MRI Cervical Spine with and without contrast (9/17/19):\par - Multilevel lower disc bulges\par - C6/C7 central disc protrusion\par \par CT Head (10/10/19): No acute intracranial abnormality, Stable ventricular size\par \par CBC & CMP (10/10/19): Both normal\par Lipid panel (10/11/19): Notable for triglyceride 225 <H> & HDL 36 <L>\par Valproic acid level (10/11/19): 43 <L>\par CK (10/11/19): 113 (WNL)

## 2021-07-27 NOTE — HISTORY OF PRESENT ILLNESS
[FreeTextEntry1] : FROM 12/12/19:\par This is a 59-year-old right-handed man, currently a resident at [facility], who has previously been followed in the Neurology resident clinic for drug-induced parkinsonism, last seen on August 1, 2019. He states that he has had at least two falls since that clinic visit, leading to admissions at [facility]. Since his most recent discharge in September 2019, he has been using a wheelchair and has not been using a walker as before. He finds that whenever he has attempted to stand, he has had tremors in both of his legs. However, when he is seated or at rest, he rarely has any tremors. The patient also states that for at least the last month, he has been experiencing daily pressure-like headaches throughout his head, without radiation and without identifiable triggers or relieving factors.\par \par FROM 5/19/21:\par The patient, now 61, states that last week, he fell backwards while in the bathroom and hit the back of his neck and shoulders, without loss of consciousness. Since then, he has been having soreness in his neck, radiating to both of his shoulders and his arms, on a daily basis. He reports having new numbness in his left arm and left lower back. He has occasional tremors of the hands. He has not had a DaTScan done to evaluate for idiopathic Parkinson's disease, as I had recommended in the last clinic visit.\par \par FROM 7/27/21:\par Since the last clinic visit, the patient has completed a physical therapy and occupational therapy course as of 6/28/21, and has subsequently been able able to ambulate with a walker. He continues to have pain at his neck, extending to both shoulders, following his fall backwards while in the bathroom in May. He continues to have slurred speech and slow movements. He has completed a MRI Brain (showing no acute intracranial abnormalities) and MRI Cervical Spine (showing progression of central canal stenosis).

## 2021-07-27 NOTE — PHYSICAL EXAM
[General Appearance - Alert] : alert [Oriented To Time, Place, And Person] : oriented to person, place, and time [Person] : oriented to person [Place] : oriented to place [Time] : oriented to time [Visual Intact] : visual attention was ~T not ~L decreased [Fluency] : fluency intact [Comprehension] : comprehension intact [Cranial Nerves Optic (II)] : visual acuity intact bilaterally,  visual fields full to confrontation, pupils equal round and reactive to light [Cranial Nerves Oculomotor (III)] : extraocular motion intact [Cranial Nerves Trigeminal (V)] : facial sensation intact symmetrically [Cranial Nerves Facial (VII)] : face symmetrical [Cranial Nerves Vestibulocochlear (VIII)] : hearing was intact bilaterally [Cranial Nerves Glossopharyngeal (IX)] : tongue and palate midline [Cranial Nerves Accessory (XI - Cranial And Spinal)] : head turning and shoulder shrug symmetric [Cranial Nerves Hypoglossal (XII)] : there was no tongue deviation with protrusion [Motor Handedness Right-Handed] : the patient is right hand dominant [Paresis Pronator Drift Left-Sided] : a left-sided pronator drift was present [Hand Weakness Left] : the hand  was weak [3] : hip flexion 3/5 [4] : ankle plantar flexion 4/5 [Tactile Decrease Entire Left Arm] : diminished left arm [Pain / Temperature Decrease Entire Arm Left] : diminished left arm [2+] : Biceps left 2+ [1+] : Ankle jerk left 1+ [Sclera] : the sclera and conjunctiva were normal [PERRL With Normal Accommodation] : pupils were equal in size, round, reactive to light, with normal accommodation [Extraocular Movements] : extraocular movements were intact [Outer Ear] : the ears and nose were normal in appearance [Oropharynx] : the oropharynx was normal [Auscultation Breath Sounds / Voice Sounds] : lungs were clear to auscultation bilaterally [Heart Rate And Rhythm] : heart rate was normal and rhythm regular [Heart Sounds] : normal S1 and S2 [Arterial Pulses Carotid] : carotid pulses were normal with no bruits [Full Pulse] : the pedal pulses are present [Abdomen Soft] : soft [Abdomen Tenderness] : non-tender [No CVA Tenderness] : no ~M costovertebral angle tenderness [Nail Clubbing] : no clubbing  or cyanosis of the fingernails [Skin Color & Pigmentation] : normal skin color and pigmentation [Skin Turgor] : normal skin turgor [] : no rash [Concentration Intact] : normal concentrating ability [Tactile Decrease Entire Leg Left] : diminished left leg [Pain / Temp Decrease Entire Leg - Left] : diminished left leg [Paresis Pronator Drift Right-Sided] : no pronator drift on the right [Hand Weakness Right] : normal hand  [Tactile Decrease Shoulders Right] : normal right shoulder [Tactile Decrease Shoulders Left] : normal left shoulder [Tactile Decrease Entire Right Arm] : normal right arm [Tactile Decrease Entire Right Side Of Face] : normal right side of the face [Tactile Decrease Entire Left Side Of Face] : normal left side of the face [Tactile Decrease Hand] : normal right hand [Tactile Decrease Neck Right Side] : normal right side of the neck [Tactile Decrease Neck Left Side] : normal left side of the neck [Tactile Decrease Entire Leg Right] : normal right leg [Pain / Temperature Decrease Shoulders Right Only] : normal right shoulder [Pain / Temperature Decrease Shoulders Left Only] : normal left shoulder [Pain / Temperature Decrease Entire Arm Right] : normal right arm [Pain / Temperature Decrease Face Right Side] : normal right side of the face [Pain / Temperature Decrease Face Left Side] : normal left side of the face [Pain / Temp Decrease Hand] : normal right hand [Pain / Temperature Decrease Neck Left Side] : normal left side of the neck [Pain / Temp Decrease Entire Leg - Right] : normal right leg [Pain / Temperature Decrease Neck Right Side] : normal right side of the neck [Past-pointing] : there was no past-pointing [Tremor] : no tremor present [Coordination - Dysmetria Impaired Finger-to-Nose Bilateral] : not present [Coordination - Dysmetria Impaired Heel-to-Shin Bilateral] : not present [Plantar Reflex Right Only] : normal on the right [Plantar Reflex Left Only] : normal on the left [___] : absent on the right [___] : absent on the left [FreeTextEntry4] : Mild to moderate dysarthria present. Immediate recall: 3/3. 5-minute delayed recall: 2/3 (recalls third word with category cue). [FreeTextEntry7] : Diminished sensation to light touch and pinprick at left lower back [FreeTextEntry8] : Wide-based stance. Unable to ambulate or perform Romberg testing, gait testing, or specialized gait testing. [FreeTextEntry1] : Tenderness to palpation at mid-upper back

## 2021-08-31 ENCOUNTER — APPOINTMENT (OUTPATIENT)
Dept: NUCLEAR MEDICINE | Facility: IMAGING CENTER | Age: 61
End: 2021-08-31
Payer: MEDICARE

## 2021-08-31 ENCOUNTER — OUTPATIENT (OUTPATIENT)
Dept: OUTPATIENT SERVICES | Facility: HOSPITAL | Age: 61
LOS: 1 days | End: 2021-08-31
Payer: MEDICARE

## 2021-08-31 ENCOUNTER — RESULT REVIEW (OUTPATIENT)
Age: 61
End: 2021-08-31

## 2021-08-31 DIAGNOSIS — G20 PARKINSON'S DISEASE: ICD-10-CM

## 2021-08-31 DIAGNOSIS — Z00.8 ENCOUNTER FOR OTHER GENERAL EXAMINATION: ICD-10-CM

## 2021-08-31 PROCEDURE — 78803 RP LOCLZJ TUM SPECT 1 AREA: CPT | Mod: 26

## 2021-08-31 PROCEDURE — A9584: CPT

## 2021-08-31 PROCEDURE — 78803 RP LOCLZJ TUM SPECT 1 AREA: CPT

## 2021-09-27 NOTE — DIETITIAN INITIAL EVALUATION ADULT. - SIGNS/SYMPTOMS
Incoming fax asking for #90 day refill of Topiramate. Has been on medication since 8/31/21. Would need to confirm dosing before seeing if #90 day can be prescribed by provider. LMTCB. Office hours and contact info given. Pt reports consuming <50% of meals, mostly fluids/soft foods at this time

## 2021-11-18 ENCOUNTER — APPOINTMENT (OUTPATIENT)
Dept: NEUROLOGY | Facility: CLINIC | Age: 61
End: 2021-11-18

## 2021-12-06 ENCOUNTER — EMERGENCY (EMERGENCY)
Facility: HOSPITAL | Age: 61
LOS: 1 days | Discharge: HOME | End: 2021-12-06
Attending: EMERGENCY MEDICINE | Admitting: EMERGENCY MEDICINE
Payer: MEDICARE

## 2021-12-06 VITALS
SYSTOLIC BLOOD PRESSURE: 130 MMHG | OXYGEN SATURATION: 99 % | RESPIRATION RATE: 18 BRPM | HEART RATE: 76 BPM | DIASTOLIC BLOOD PRESSURE: 70 MMHG

## 2021-12-06 VITALS
SYSTOLIC BLOOD PRESSURE: 116 MMHG | DIASTOLIC BLOOD PRESSURE: 69 MMHG | OXYGEN SATURATION: 98 % | HEIGHT: 71 IN | RESPIRATION RATE: 18 BRPM | HEART RATE: 77 BPM | TEMPERATURE: 98 F

## 2021-12-06 DIAGNOSIS — F79 UNSPECIFIED INTELLECTUAL DISABILITIES: ICD-10-CM

## 2021-12-06 DIAGNOSIS — F31.9 BIPOLAR DISORDER, UNSPECIFIED: ICD-10-CM

## 2021-12-06 LAB
ALBUMIN SERPL ELPH-MCNC: 4.4 G/DL — SIGNIFICANT CHANGE UP (ref 3.3–5)
ALP SERPL-CCNC: 44 U/L — SIGNIFICANT CHANGE UP (ref 40–120)
ALT FLD-CCNC: 14 U/L — SIGNIFICANT CHANGE UP (ref 4–41)
AMPHET UR-MCNC: NEGATIVE — SIGNIFICANT CHANGE UP
ANION GAP SERPL CALC-SCNC: 10 MMOL/L — SIGNIFICANT CHANGE UP (ref 7–14)
APAP SERPL-MCNC: <5 UG/ML — LOW (ref 15–25)
APPEARANCE UR: ABNORMAL
AST SERPL-CCNC: 11 U/L — SIGNIFICANT CHANGE UP (ref 4–40)
BACTERIA # UR AUTO: NEGATIVE — SIGNIFICANT CHANGE UP
BARBITURATES UR SCN-MCNC: POSITIVE
BASOPHILS # BLD AUTO: 0.04 K/UL — SIGNIFICANT CHANGE UP (ref 0–0.2)
BASOPHILS NFR BLD AUTO: 0.6 % — SIGNIFICANT CHANGE UP (ref 0–2)
BENZODIAZ UR-MCNC: NEGATIVE — SIGNIFICANT CHANGE UP
BILIRUB SERPL-MCNC: 0.2 MG/DL — SIGNIFICANT CHANGE UP (ref 0.2–1.2)
BILIRUB UR-MCNC: NEGATIVE — SIGNIFICANT CHANGE UP
BUN SERPL-MCNC: 22 MG/DL — SIGNIFICANT CHANGE UP (ref 7–23)
CALCIUM SERPL-MCNC: 9.5 MG/DL — SIGNIFICANT CHANGE UP (ref 8.4–10.5)
CHLORIDE SERPL-SCNC: 105 MMOL/L — SIGNIFICANT CHANGE UP (ref 98–107)
CO2 SERPL-SCNC: 27 MMOL/L — SIGNIFICANT CHANGE UP (ref 22–31)
COCAINE METAB.OTHER UR-MCNC: NEGATIVE — SIGNIFICANT CHANGE UP
COLOR SPEC: YELLOW — SIGNIFICANT CHANGE UP
COVID-19 SPIKE DOMAIN AB INTERP: POSITIVE
COVID-19 SPIKE DOMAIN ANTIBODY RESULT: >250 U/ML — HIGH
CREAT SERPL-MCNC: 0.76 MG/DL — SIGNIFICANT CHANGE UP (ref 0.5–1.3)
CREATININE URINE RESULT, DAU: 110 MG/DL — SIGNIFICANT CHANGE UP
DIFF PNL FLD: NEGATIVE — SIGNIFICANT CHANGE UP
EOSINOPHIL # BLD AUTO: 0.16 K/UL — SIGNIFICANT CHANGE UP (ref 0–0.5)
EOSINOPHIL NFR BLD AUTO: 2.5 % — SIGNIFICANT CHANGE UP (ref 0–6)
EPI CELLS # UR: 0 /HPF — SIGNIFICANT CHANGE UP (ref 0–5)
ETHANOL SERPL-MCNC: <10 MG/DL — SIGNIFICANT CHANGE UP
GLUCOSE SERPL-MCNC: 111 MG/DL — HIGH (ref 70–99)
GLUCOSE UR QL: NEGATIVE — SIGNIFICANT CHANGE UP
HCT VFR BLD CALC: 40.8 % — SIGNIFICANT CHANGE UP (ref 39–50)
HGB BLD-MCNC: 13.8 G/DL — SIGNIFICANT CHANGE UP (ref 13–17)
HYALINE CASTS # UR AUTO: 0 /LPF — SIGNIFICANT CHANGE UP (ref 0–7)
IANC: 4.24 K/UL — SIGNIFICANT CHANGE UP (ref 1.5–8.5)
IMM GRANULOCYTES NFR BLD AUTO: 0.3 % — SIGNIFICANT CHANGE UP (ref 0–1.5)
KETONES UR-MCNC: ABNORMAL
LEUKOCYTE ESTERASE UR-ACNC: NEGATIVE — SIGNIFICANT CHANGE UP
LYMPHOCYTES # BLD AUTO: 1.49 K/UL — SIGNIFICANT CHANGE UP (ref 1–3.3)
LYMPHOCYTES # BLD AUTO: 23.1 % — SIGNIFICANT CHANGE UP (ref 13–44)
MCHC RBC-ENTMCNC: 30.7 PG — SIGNIFICANT CHANGE UP (ref 27–34)
MCHC RBC-ENTMCNC: 33.8 GM/DL — SIGNIFICANT CHANGE UP (ref 32–36)
MCV RBC AUTO: 90.7 FL — SIGNIFICANT CHANGE UP (ref 80–100)
METHADONE UR-MCNC: NEGATIVE — SIGNIFICANT CHANGE UP
MONOCYTES # BLD AUTO: 0.49 K/UL — SIGNIFICANT CHANGE UP (ref 0–0.9)
MONOCYTES NFR BLD AUTO: 7.6 % — SIGNIFICANT CHANGE UP (ref 2–14)
NEUTROPHILS # BLD AUTO: 4.24 K/UL — SIGNIFICANT CHANGE UP (ref 1.8–7.4)
NEUTROPHILS NFR BLD AUTO: 65.9 % — SIGNIFICANT CHANGE UP (ref 43–77)
NITRITE UR-MCNC: NEGATIVE — SIGNIFICANT CHANGE UP
NRBC # BLD: 0 /100 WBCS — SIGNIFICANT CHANGE UP
NRBC # FLD: 0 K/UL — SIGNIFICANT CHANGE UP
OPIATES UR-MCNC: NEGATIVE — SIGNIFICANT CHANGE UP
OXYCODONE UR-MCNC: NEGATIVE — SIGNIFICANT CHANGE UP
PCP SPEC-MCNC: SIGNIFICANT CHANGE UP
PCP UR-MCNC: NEGATIVE — SIGNIFICANT CHANGE UP
PH UR: 7.5 — SIGNIFICANT CHANGE UP (ref 5–8)
PLATELET # BLD AUTO: 170 K/UL — SIGNIFICANT CHANGE UP (ref 150–400)
POTASSIUM SERPL-MCNC: 4.2 MMOL/L — SIGNIFICANT CHANGE UP (ref 3.5–5.3)
POTASSIUM SERPL-SCNC: 4.2 MMOL/L — SIGNIFICANT CHANGE UP (ref 3.5–5.3)
PROT SERPL-MCNC: 7.1 G/DL — SIGNIFICANT CHANGE UP (ref 6–8.3)
PROT UR-MCNC: ABNORMAL
RBC # BLD: 4.5 M/UL — SIGNIFICANT CHANGE UP (ref 4.2–5.8)
RBC # FLD: 13.8 % — SIGNIFICANT CHANGE UP (ref 10.3–14.5)
RBC CASTS # UR COMP ASSIST: 1 /HPF — SIGNIFICANT CHANGE UP (ref 0–4)
SALICYLATES SERPL-MCNC: <0.3 MG/DL — LOW (ref 15–30)
SARS-COV-2 IGG+IGM SERPL QL IA: >250 U/ML — HIGH
SARS-COV-2 IGG+IGM SERPL QL IA: POSITIVE
SARS-COV-2 RNA SPEC QL NAA+PROBE: SIGNIFICANT CHANGE UP
SODIUM SERPL-SCNC: 142 MMOL/L — SIGNIFICANT CHANGE UP (ref 135–145)
SP GR SPEC: 1.02 — SIGNIFICANT CHANGE UP (ref 1–1.05)
THC UR QL: NEGATIVE — SIGNIFICANT CHANGE UP
TSH SERPL-MCNC: 1.18 UIU/ML — SIGNIFICANT CHANGE UP (ref 0.27–4.2)
UROBILINOGEN FLD QL: SIGNIFICANT CHANGE UP
VALPROATE SERPL-MCNC: 52.1 UG/ML — SIGNIFICANT CHANGE UP (ref 50–100)
WBC # BLD: 6.44 K/UL — SIGNIFICANT CHANGE UP (ref 3.8–10.5)
WBC # FLD AUTO: 6.44 K/UL — SIGNIFICANT CHANGE UP (ref 3.8–10.5)
WBC UR QL: 0 /HPF — SIGNIFICANT CHANGE UP (ref 0–5)

## 2021-12-06 PROCEDURE — 93010 ELECTROCARDIOGRAM REPORT: CPT

## 2021-12-06 PROCEDURE — 90792 PSYCH DIAG EVAL W/MED SRVCS: CPT

## 2021-12-06 PROCEDURE — 99284 EMERGENCY DEPT VISIT MOD MDM: CPT | Mod: 25

## 2021-12-06 NOTE — ED BEHAVIORAL HEALTH ASSESSMENT NOTE - HPI (INCLUDE ILLNESS QUALITY, SEVERITY, DURATION, TIMING, CONTEXT, MODIFYING FACTORS, ASSOCIATED SIGNS AND SYMPTOMS)
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     Patient seen at bedside, A & OX4, not appearing overtly depressed/manic or psychotic. Reports he feels better now. Pt states he said something stupid at his residence so was sent for an evaluation. Pt reports of feeling "a little depressed" for the few weeks. Pt names stressor of the anniversary of his father's and mother death and the upcoming holidays as perpetuating factors contributing to his depressed mood. He however denies feeling persistently depressed, feeling of hopelessness or worthlessness. Pt adamantly denies any intent or plan to kill himself, "I swear to GOD I will never try to hurt my self". States that he just wanted to speak with some one as he is psychiatrist was not available at the time. States he is motivated to continue his treatment and his looking forward speaking his psychiatrist which he sees every Friday. Pt states that he does not want to be hospitalized, requesting to return to his residence.     Pt denies all neurovegetative symptoms, denies si/hi/I/P. Pt denies changes in appetite or energy level, denies problem with sleep ( sleeps "fine' with the help of melatonin), denies thoughts of harming himself or others. Pt denies sxs of catrachito, denies sxs of anxiety or panic attacks. Pt states he likes it at his residence, denies feeling fearful any one, denies hearing voices or seeing things that others cannot hear or see.    Spoke with his brother Chapin ( [phone]). He reports that pt usually trys to leave the residence and seek medical care around this time. He relates this to the anniversary of his parents death and  not being around family during the holiday season. Chapin denies any past SA, has no imminent concerns that his brother will hurt himself. Pt is a 60 y/o male, domiciled at 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     Patient seen at bedside, A & OX4, not appearing overtly depressed/manic or psychotic. Reports he feels better now. Pt states he said something stupid at his residence so was sent for an evaluation. Pt reports of feeling "a little depressed" for the few weeks. Pt names stressor of the anniversary of his father's and mother death and the upcoming holidays as perpetuating factors contributing to his depressed mood. He however denies feeling persistently depressed, feeling of hopelessness or worthlessness. Pt adamantly denies any intent or plan to kill himself, "I swear to GOD I will never try to hurt my self". States that he just wanted to speak with some one as he is psychiatrist was not available at the time. States he is motivated to continue his treatment and his looking forward speaking his psychiatrist which he sees every Friday. Pt states that he does not want to be hospitalized, requesting to return to his residence.     Pt denies all neurovegetative symptoms, denies si/hi/I/P. Pt denies changes in appetite or energy level, denies problem with sleep ( sleeps "fine' with the help of melatonin), denies thoughts of harming himself or others. Pt denies sxs of catrachito, denies sxs of anxiety or panic attacks. Pt states he likes it at his residence, denies feeling fearful any one, denies hearing voices or seeing things that others cannot hear or see.    Spoke with his brother Chapin ( [phone]). He reports that pt usually try to leave the residence and seek medical care around this time. He relates this to the anniversary of his parents death and not being around family during the holiday season. Chapin denies any past SA, has no imminent concerns that his brother will hurt himself.    Spoke with Nurse Sara Gates ([phone]): states she referred pt to the ED out of protocol. She denies pt endorses any intent or plan, and also does not have any acute safety concerns.  She has no animosity to have pt return to the residence.    His psychiatrist Dr. LINDA Riddle [phone]; States pt usually endorse somatic symptoms to get attention and has no safety concerns that pt will intentionally hurt himself. Does not wish to initiate antidepressant at this time. States he will see pt this Friday 12/10/21 for his next scheduled appointment.

## 2021-12-06 NOTE — ED ADULT TRIAGE NOTE - CHIEF COMPLAINT QUOTE
p/t with hx depression sent from [facility] for eval, SI no plan, p/t appears calm and cooperative @ present

## 2021-12-06 NOTE — ED ADULT NURSE NOTE - OBJECTIVE STATEMENT
Bed-bound Pt. is A+Ox4 BIBEMS from [facility] for psych eval. Admits to SI. Denies having a plan, At present he is calm and cooperative. VS stable. NAD noted.

## 2021-12-06 NOTE — ED ADULT NURSE NOTE - CHIEF COMPLAINT QUOTE
p/t with hx depression sent from [facility] for eval, SI no plan, p/t appears calm and cooperative @ present

## 2021-12-06 NOTE — ED ADULT TRIAGE NOTE - ESI TRIAGE ACUITY LEVEL, MLM
The patient has been examined and the H&P has been reviewed:    I concur with the findings and no changes have occurred since H&P was written.     Patient cleared to proceed with surgery at the [facility] today.     Anesthesia/Surgery risks, benefits and alternative options discussed and understood by patient/family.          There are no hospital problems to display for this patient.     2

## 2021-12-06 NOTE — ED PROVIDER NOTE - NSFOLLOWUPINSTRUCTIONS_ED_ALL_ED_FT
you came to the Ed for depression and feeling like you want to die. you came to the Ed for depression and feeling like you want to die.    you were seen by psych to see if you need acute hospitalization.  they believe you are safe with your current care.  your labs were all checked and they are included with your dc summary.    if you have any additional concerns you can return to the ED at any time.    You are being discharged from the Emergency Department after evaluation of your presenting problem.  You were found not to have an emergency that requires hospitalization or surgery, but this does not mean you do not have a health concern.  You should follow up with your primary care physician and any other physicians suggested at time of discharge.  Also, if your condition worsens or changes know that the emergency department is open and available 24 hours a day/ 7 days a week and you should return to us if you have concerns. Thank you for allowing us to participate in your care.

## 2021-12-06 NOTE — ED BEHAVIORAL HEALTH ASSESSMENT NOTE - CASE SUMMARY
61M with intellectual disability and bipolar presents from nursing home after making a suicidal statement. Patient calm, cooperative, appropriate, remorseful regarding the statement he made, adamant that he has NO INTENT to hurt himself and does not want to die. Notes this is a stressful time of year for him. Has psychiatric follow up at the nursing home. Able to safety plan. Not manic or psychotic. No homicidal ideation and non-violent. No indication for psych admission. DC.

## 2021-12-06 NOTE — ED PROVIDER NOTE - OBJECTIVE STATEMENT
osiel: pt told person at his facility that he wants to kill himself.  no plan identified as per patient. the patient says it is the anniversary of his father's death, and that is part of why he is so depressed.  pt with hx bipolar depression, parkinson's, hypothyroidism, copd, bph, normal pressure hydrocephalus, psychosis, migraine issues, cognitive issues. pt is bedbound  pt says he has a visit with a practioner scheduled for friday, but couldn't wait that long  meds: risperdal, simvastatin, proscar, depakote, synthroid, melatonin, dicyclomine, klonipin (6pm), prilosec    denies N/V/abd pain/ HA/ fever/ chest pain/ SOB/ dysuria/ urgency/ acute extremity issue    pt is composed but tearful during discussion

## 2021-12-06 NOTE — ED BEHAVIORAL HEALTH ASSESSMENT NOTE - PRIMARY DX
HTN (hypertension)    Hypothyroid    Sleep apnea  uses cpap Bipolar disorder Bipolar disorder, unspecified

## 2021-12-06 NOTE — ED BEHAVIORAL HEALTH ASSESSMENT NOTE - NS ED BHA BILLING ATTENDING W NP SUPERVISION ATTESTATION
[de-identified] : 64 year old female with HTN, HLD, COPD, fibromyalgia, obesity, colonic polyps (tubular adenoma), recent EGD with +Hpylori presenting for follow-up. She states she occasionally gets a burning sensation but otherwise denies GERD symptoms. She reports she still has the sensation of choking and coughing with eating solid foods - does not happen every time. Does not feel like pills or food gets stuck while going down. Reports for last 2 weeks she has been eating more starch and less fiber/vegetables and has been constipated having to strain with BM. She reports pain at the anus with BM and intermittent blood with wiping. She also reports a pain under her right breast on her ribs to her right flank also on her ribs that is exacerbated by movement only. She also reports recent ear ache and sore throat.\par \par Patient underwent EGD/colonoscopy on 11/3/2020. She had 2 TAs and EGD bx positive for Hpylori. She was prescribed levaquin based therapy due to interaction with methotrexate and bismuth. \par \par EGD\par Findings:\par      A non-obstructing Schatzki ring was found at the gastroesophageal \par      junction.\par      A 2 cm hiatal hernia was present.\par      The gastroesophageal flap valve was visualized endoscopically and \par      classified as Hill Grade III (minimal fold, loose to endoscope, hiatal \par      hernia likely).\par      Scattered moderate mucosal changes characterized by nodularity were \par      found in the entire esophagus. Biopsies were taken of the proximal and \par      distal esophagus with a cold forceps for histology and rule out EoE.\par      Scattered mild mucosal changes characterized by erythema and granularity \par      were found in the gastric body and in the gastric antrum. Biopsies were \par      taken with a cold forceps for Helicobacter pylori testing.\par      Diffuse mild mucosal changes characterized by congestion and erythema \par      were found in the duodenal bulb.\par      The second portion of the duodenum was normal.\par                                                                                \par Impression:          - Non-obstructing Schatzki ring.\par                      - 2 cm hiatal hernia.\par                      - Gastroesophageal flap valve classified as Hill Grade \par                      III (minimal fold, loose to endoscope, hiatal hernia \par                      likely).\par                      - Nodular mucosa in the esophagus. Biopsied.\par                      - Erythematous and granular mucosa in the gastric body \par                      and antrum. Biopsied.\par                      - Mucosal changes in the duodenum.\par                      - Normal second portion of the duodenum.\par \par \par Colonoscopy\par Findings:\par      Skin tags were found on perianal exam.\par      A 3 mm polyp was found in the cecum. The polyp was sessile. The polyp \par      was removed with a cold snare. Resection and retrieval were complete.\par      A 5 mm polyp was found in the sigmoid colon. The polyp was sessile. The \par      polyp was removed with a cold snare. Resection and retrieval were \par      complete.\par      A 2 mm polyp was found in the sigmoid colon. The polyp was flat. \par      Biopsies were taken with a cold forceps for histology.\par      A 3 mm polyp was found in the sigmoid colon. The polyp was sessile. The \par      polyp was removed with a jumbo cold forceps. Resection and retrieval \par      were complete.\par      A few sessile polyps were found in the recto-sigmoid colon. The polyps \par      were less than 5 mm in size and appeared hyperplastic in nature.\par      A few small-mouthed diverticula were found in the sigmoid colon, \par      descending colon and ascending colon.\par      Internal hemorrhoids were found during retroflexion. The hemorrhoids \par      were large.\par      The ileocecal valve was moderately lipomatous. Biopsies were taken with \par      a cold forceps for histology.\par                                                                                \par Impression:          - Perianal skin tags found on perianal exam.\par                      - One 3 mm polyp in the cecum, removed with a cold \par                      snare. Resected and retrieved.\par                      - One 5 mm polyp in the sigmoid colon, removed with a \par                      cold snare. Resected and retrieved.\par                      - One 2 mm polyp in the sigmoid colon. Biopsied.\par                      - One 3 mm polyp in the sigmoid colon, removed with a \par                      jumbo cold forceps. Resected and retrieved.\par                      - A few less than 5 mm polyps at the recto-sigmoid \par                      colon, hyperplastic appearing.\par                      - Diverticulosis in the sigmoid colon and in the \par                      descending colon.\par                      - Internal hemorrhoids.\par                      - Lipomatous ileocecal valve. Biopsied.
Agree

## 2021-12-06 NOTE — ED PROVIDER NOTE - NSICDXPASTMEDICALHX_GEN_ALL_CORE_FT
PAST MEDICAL HISTORY:  Bipolar Disorder     Hydronephrosis with Renal and Ureteral Calculous Obstruction at last admission in [facility] ([place]) 2010    Hypothyroidism     MR (mental retardation)     Nephrolithiasis

## 2021-12-06 NOTE — ED BEHAVIORAL HEALTH ASSESSMENT NOTE - REFERRAL / APPOINTMENT DETAILS
Will f/u with scheduled psychiatrist appointment on 12/1021 Will f/u with scheduled appointment on 12/1021 with his psychiatrist Dr. Riddle Will f/u with scheduled appointment on 12/10/21 with his psychiatrist Dr. Riddle

## 2021-12-06 NOTE — PROVIDER CONTACT NOTE (OTHER) - ASSESSMENT
BRIGIDA has spoken to Darleen,  ([phone]) to inform of pts return. BRIGIDA arranged ambulance via [facility] ([phone]), MAS invoice #[id number], trip id: [id number], : 2pm. No further SW needs at this time.

## 2021-12-06 NOTE — ED PROVIDER NOTE - NSICDXFAMILYHX_GEN_ALL_CORE_FT
Parent FAMILY HISTORY:  Sibling  Still living? Yes, Estimated age: 51-60  Family history of hyperparathyroidism, Age at diagnosis: Age Unknown

## 2021-12-06 NOTE — ED BEHAVIORAL HEALTH ASSESSMENT NOTE - CURRENT MEDICATION
Risperdal 1.5 mg BID, Depakote  mg q am, Depakote 1000mg qhs, melatonin 3 mg qhs, Klonopin 0.5mg daily at 1800 hrs, simvastatin 20  mg qhs, Proscar 5 mg daily, synthroid 75 mcg daily, MVI 1 tab daily, Vit C 500 mg daily, butalbital-acetaminophen-caffeine [id number] BID PRN for headache, dicyclomine 20 mg QID, Prilosec 20 mg daily, simethicone 125 mg daily, dulcolax 10 mg CT PRN for constipation.

## 2021-12-06 NOTE — ED BEHAVIORAL HEALTH ASSESSMENT NOTE - DESCRIPTION
domiciled at [facility] where is receiving hospice care for the past 10 yrs During course of ED visit patient was calm and cooperative. Patient has not tested limits, has maintained appropriate boundaries, has been easily directed. Patient was not aggressive or violent and did not require PRN medications.   Vital Signs Last 24 Hrs  T(C): 36.7 (06 Dec 2021 09:43), Max: 36.7 (06 Dec 2021 09:43)  T(F): 98 (06 Dec 2021 09:43), Max: 98 (06 Dec 2021 09:43)  HR: 77 (06 Dec 2021 09:43) (77 - 77)  BP: 116/69 (06 Dec 2021 09:43) (116/69 - 116/69)  BP(mean): --  RR: 18 (06 Dec 2021 09:43) (18 - 18)  SpO2: 98% (06 Dec 2021 09:43) (98% - 98%) see HPI

## 2021-12-06 NOTE — ED BEHAVIORAL HEALTH ASSESSMENT NOTE - SUMMARY
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     Pt endorses depressive episode of "feeling sad" in the context of the anniversary of his parents passing away. On exam he is euthymic and well related. He doesn’t demonstrate signs/symptoms of suggestive of an acute mood instability, thought disorder or psychosis. Pt reports of "feeling better" now that he was able to speak with some one, adamantly denies current SIIP. He is hopeful, able to safety plan, asks to go back to his residence. There is no evidence that patient is at elevated imminent risk of harm to self or others that would require involuntary commitment.  -pt is psychiatric cleared and appropriate for d/c back to his residence.  -coping skills and safety planning reinforced  -pt instructed to speak to staff is symptoms worsen.

## 2021-12-06 NOTE — ED BEHAVIORAL HEALTH ASSESSMENT NOTE - OTHER PAST PSYCHIATRIC HISTORY (INCLUDE DETAILS REGARDING ONSET, COURSE OF ILLNESS, INPATIENT/OUTPATIENT TREATMENT)
H/O Mental retardation, Bipolar Disorder, per CVM hx of 3 prior hospitalization, last at [facility] 11/2008, no past SA/SIB, sees psychiatrist Dr. Riddle every Friday at residence,

## 2021-12-06 NOTE — ED BEHAVIORAL HEALTH ASSESSMENT NOTE - NSSUICPROTFACT_PSY_ALL_CORE
Responsibility to children, family, or others/Identifies reasons for living/Fear of death or the actual act of killing self/Cultural, spiritual and/or moral attitudes against suicide/Positive therapeutic relationships/Rastafari beliefs

## 2021-12-06 NOTE — ED PROVIDER NOTE - PATIENT PORTAL LINK FT
You can access the FollowMyHealth Patient Portal offered by [facility] by registering at the following website: [email or web address]. By joining [employer]’s FollowMyHealth portal, you will also be able to view your health information using other applications (apps) compatible with our system.

## 2021-12-06 NOTE — ED PROVIDER NOTE - PHYSICAL EXAMINATION
pt alert and can phonate well  h at/nc  perrl, conj clear, sclera anicteric,  neck supple  cor rrr pos s1s2  lungs clear to asno wheeze  abd soft no r/g/t  ext no edema no deformities  neueo awake, lucid normal gait moves a  psych tearful  vs reasonable

## 2021-12-06 NOTE — ED PROVIDER NOTE - CLINICAL SUMMARY MEDICAL DECISION MAKING FREE TEXT BOX
pt here with expression of wanting to kill himself at facility, hx bipolar, depression, parkinson's hypothyroidism  will check labs and to be seen by psych

## 2021-12-06 NOTE — PROVIDER CONTACT NOTE (OTHER) - BACKGROUND
Per MD Santana, pt is cleared for d/c and can return to [facility] ([address] [place] [address] [address] , [place]).

## 2021-12-06 NOTE — ED BEHAVIORAL HEALTH ASSESSMENT NOTE - RISK ASSESSMENT
Pt at this time is at low acute risk for si, denies si/hi, no acute mood sx, and not acutely psychotic.    Protective factors; no current si, no hi or violence, sobriety, no access to weapons, no prior SA/SIB,  supportive social network and family, able to engage in safety planning. The pt does not present an imminent risk for suicide at this time. Low Acute Suicide Risk

## 2021-12-06 NOTE — ED BEHAVIORAL HEALTH ASSESSMENT NOTE - SAFETY PLAN ADDT'L DETAILS
Safety plan discussed with.../Education provided regarding environmental safety / lethal means restriction/Provision of National Suicide Prevention Lifeline 5-085-232-WBLZ (3090)

## 2021-12-06 NOTE — ED BEHAVIORAL HEALTH ASSESSMENT NOTE - DETAILS
Nurse manager Meghan Gates aware of disposition no past SA, denies current SIIP per chart review reports he used to get "angry" in the past and get into fights when he was not on meds see hpi

## 2021-12-06 NOTE — ED BEHAVIORAL HEALTH ASSESSMENT NOTE - OTHER
not assessed, uses wheelchair speech impediment staff peers brother (Brittny reports impaired strength in LE Nurse Sara (Meghan Gates) from residence

## 2021-12-06 NOTE — ED ADULT NURSE NOTE - NSICDXPASTMEDICALHX_GEN_ALL_CORE_FT
PAST MEDICAL HISTORY:  Bipolar Disorder     Hydronephrosis with Renal and Ureteral Calculous Obstruction at last admission in [facility] ([place]) 2010    Hypothyroidism     MR (mental retardation)     Nephrolithiasis

## 2021-12-07 NOTE — ED POST DISCHARGE NOTE - REASON FOR FOLLOW-UP
COVID-19 AB : detected. Patient contact # [phone] S/W patient's sister who said patient fully vaccinated. Other

## 2021-12-07 NOTE — ED BEHAVIORAL HEALTH NOTE - BEHAVIORAL HEALTH NOTE
HIGH RISK LOG: Writer called  pt's sister states pt is fine and lives in a nursing home where he will continue his follow up care.

## 2022-03-24 ENCOUNTER — OUTPATIENT (OUTPATIENT)
Dept: OUTPATIENT SERVICES | Facility: HOSPITAL | Age: 62
LOS: 1 days | End: 2022-03-24
Payer: MEDICARE

## 2022-03-24 ENCOUNTER — APPOINTMENT (OUTPATIENT)
Dept: CT IMAGING | Facility: IMAGING CENTER | Age: 62
End: 2022-03-24
Payer: MEDICARE

## 2022-03-24 DIAGNOSIS — K57.90 DIVERTICULOSIS OF INTESTINE, PART UNSPECIFIED, WITHOUT PERFORATION OR ABSCESS WITHOUT BLEEDING: ICD-10-CM

## 2022-03-24 PROCEDURE — 74177 CT ABD & PELVIS W/CONTRAST: CPT | Mod: 26

## 2022-03-24 PROCEDURE — 74177 CT ABD & PELVIS W/CONTRAST: CPT

## 2022-04-25 NOTE — PHYSICAL THERAPY INITIAL EVALUATION ADULT - THERAPY FREQUENCY, PT EVAL
----- Message from JUAN Martines sent at 4/20/2022  3:56 PM EDT -----  Stomach biopsy consistent with gastritis.  Esophageal biopsies consistent with eosinophilic esophagitis.  This is when there is an increase in cells that fight diseases, mainly allergens.  Sending in budesonide slurry for treatment.  Please also schedule patient for repeat EGD in 4 to 6 weeks for evaluation and possible repeat dilatation.   Called Pt; unable to LVM; VM is full.    EGD scheduled for 05/23/2022 arrival time 1200. Educated and mailed pt EGD prep instructions. Pt verified understanding.    Patient has called the office and is requesting a return call.    Spoke to pt and informed of Ángel larsen. Educated pt on EOE and Budesonide slurry administration. Pt verified understanding.   Case Request entered for second sign.   Ashwini 2-3x/week

## 2022-06-22 ENCOUNTER — APPOINTMENT (OUTPATIENT)
Dept: GASTROENTEROLOGY | Facility: CLINIC | Age: 62
End: 2022-06-22
Payer: MEDICARE

## 2022-06-22 VITALS
TEMPERATURE: 98 F | WEIGHT: 188 LBS | RESPIRATION RATE: 18 BRPM | DIASTOLIC BLOOD PRESSURE: 75 MMHG | HEIGHT: 71 IN | SYSTOLIC BLOOD PRESSURE: 118 MMHG | BODY MASS INDEX: 26.32 KG/M2 | HEART RATE: 70 BPM | OXYGEN SATURATION: 98 %

## 2022-06-22 DIAGNOSIS — K58.2 MIXED IRRITABLE BOWEL SYNDROME: ICD-10-CM

## 2022-06-22 PROCEDURE — 99213 OFFICE O/P EST LOW 20 MIN: CPT

## 2022-06-22 NOTE — HISTORY OF PRESENT ILLNESS
[FreeTextEntry1] : This is a 62-year-old man with history of Parkinson's disease, wheelchair-bound, history of recurrent acute diverticulitis and C. difficile colitis, presents for evaluation of left lower quad abdominal pain and irregular bowel movements.  He is accompanied by a formal caregiver.  He is coming from a nursing home.  He is a new patient to me.  He is previously followed by Dr. Artem Tinoco.  He has history of chronic left lower side abdominal pain for many years.  Review of records reveal CT abdomen pelvis performed April 2021 and March 2022 for symptoms of abdominal pain.  Both CT abdomen pelvis revealed diverticulosis without evidence of diverticulitis or colitis.  He underwent a colonoscopy by Dr. Tinoco in 2018 showing diverticulosis, 4 mm tubular adenoma, otherwise unremarkable exam.  She relates that the abdominal pain occurs once or twice a week and usually accompanied by either diarrhea or constipation.  He states that he has normal bowel movements followed by diarrheal stools.  He has occasional constipation in the same week.  He denies associated rectal bleeding or melena.  He admits to having dairy products including milk, cheese and ice cream on a weekly basis.

## 2022-06-22 NOTE — ASSESSMENT
[FreeTextEntry1] : This is a 62-year-old man with symptoms of chronic abdominal pain with irregular bowel movements alternating between diarrhea and constipation.  This most likely is due to irritable bowel syndrome and lactose intolerance.  I explained this to the patient at length.  I recommend lactose-free diet.  He will need a screening colonoscopy in 2023.  He has a family history of colon cancer and personal history of adenomatous colon polyps.

## 2022-06-22 NOTE — PHYSICAL EXAM
[General Appearance - Alert] : alert [General Appearance - In No Acute Distress] : in no acute distress [FreeTextEntry1] : wheelchair bound [Neck Appearance] : the appearance of the neck was normal [Auscultation Breath Sounds / Voice Sounds] : lungs were clear to auscultation bilaterally [Heart Rate And Rhythm] : heart rate was normal and rhythm regular [Heart Sounds] : normal S1 and S2 [Heart Sounds Gallop] : no gallops [Murmurs] : no murmurs [Heart Sounds Pericardial Friction Rub] : no pericardial rub [Edema] : there was no peripheral edema [Bowel Sounds] : normal bowel sounds [Abdomen Soft] : soft [Abdomen Tenderness] : non-tender [] : no hepato-splenomegaly [Abdomen Mass (___ Cm)] : no abdominal mass palpated [Skin Color & Pigmentation] : normal skin color and pigmentation

## 2022-08-09 ENCOUNTER — APPOINTMENT (OUTPATIENT)
Dept: GASTROENTEROLOGY | Facility: CLINIC | Age: 62
End: 2022-08-09

## 2022-09-28 NOTE — PROGRESS NOTE ADULT - PROBLEM SELECTOR PLAN 5
Birgit C/w depakote
Simple Simulation Preamble Text Will Be Included With Simple Simulations (.......... Indications): Simple simulation was performed today for the following reasons:

## 2022-11-29 ENCOUNTER — APPOINTMENT (OUTPATIENT)
Dept: GASTROENTEROLOGY | Facility: CLINIC | Age: 62
End: 2022-11-29

## 2022-12-30 ENCOUNTER — APPOINTMENT (OUTPATIENT)
Dept: NEUROLOGY | Facility: CLINIC | Age: 62
End: 2022-12-30
Payer: MEDICARE

## 2022-12-30 VITALS
SYSTOLIC BLOOD PRESSURE: 120 MMHG | HEIGHT: 71 IN | WEIGHT: 176 LBS | DIASTOLIC BLOOD PRESSURE: 76 MMHG | BODY MASS INDEX: 24.64 KG/M2 | HEART RATE: 74 BPM

## 2022-12-30 DIAGNOSIS — Z79.899 OTHER LONG TERM (CURRENT) DRUG THERAPY: ICD-10-CM

## 2022-12-30 DIAGNOSIS — G25.9 EXTRAPYRAMIDAL AND MOVEMENT DISORDER, UNSPECIFIED: ICD-10-CM

## 2022-12-30 DIAGNOSIS — T43.505A EXTRAPYRAMIDAL AND MOVEMENT DISORDER, UNSPECIFIED: ICD-10-CM

## 2022-12-30 DIAGNOSIS — R26.9 UNSPECIFIED ABNORMALITIES OF GAIT AND MOBILITY: ICD-10-CM

## 2022-12-30 PROCEDURE — 99215 OFFICE O/P EST HI 40 MIN: CPT

## 2022-12-30 RX ORDER — BENZTROPINE MESYLATE 0.5 MG/1
0.5 TABLET ORAL
Qty: 60 | Refills: 4 | Status: ACTIVE | COMMUNITY
Start: 2022-12-30 | End: 1900-01-01

## 2022-12-30 NOTE — DISCUSSION/SUMMARY
[FreeTextEntry1] : Tato Vera is a 62 year old M with a PMHx of Bipolar Disorder who presents for initial evaluation in the Movement Disorders Clinic at [facility]. He was previously seeing Dr. Carlo Snowden. He is accompanied by his niece, a psychiatric nurse practitioner. \par \par The patient's physical examination is consistent with Parkinsonism. He had a negative Denice scan, suggesting drug induced Parkinsonism. Discussed the risks and benefits of medication adjustments and discussed lifestyle modifications in detail.\par \par Plan:\par - Trial of Benztropine 0.5mg BID\par - Continue Sinemet 10-100mg 1 tab at 7a-3575a-4p\par - PT/OT/SLP referrals\par - Encouraged to increase exercise and physical activity and maintain an active social and intellectual life.\par - Discussed the Mediterranean diet, antiinflammatory/antioxidant foods, probiotics, fiber, caffeine, and vitamins\par \par RTC 2 months\par \par All questions were answered to their satisfaction. I spent 60 minutes on this encounter.

## 2022-12-30 NOTE — PHYSICAL EXAM
[Person] : oriented to person [Place] : oriented to place [Time] : oriented to time [Concentration Intact] : normal concentrating ability [Comprehension] : comprehension intact [Cranial Nerves Optic (II)] : visual acuity intact bilaterally,  visual fields full to confrontation, pupils equal round and reactive to light [Cranial Nerves Oculomotor (III)] : extraocular motion intact [Cranial Nerves Trigeminal (V)] : facial sensation intact symmetrically [Cranial Nerves Facial (VII)] : face symmetrical [Cranial Nerves Vestibulocochlear (VIII)] : hearing was intact bilaterally [Cranial Nerves Glossopharyngeal (IX)] : tongue and palate midline [Cranial Nerves Accessory (XI - Cranial And Spinal)] : head turning and shoulder shrug symmetric [Cranial Nerves Hypoglossal (XII)] : there was no tongue deviation with protrusion [Motor Strength] : muscle strength was normal in all four extremities [Involuntary Movements] : no involuntary movements were seen [Paresis Pronator Drift Right-Sided] : no pronator drift on the right [Paresis Pronator Drift Left-Sided] : no pronator drift on the left [Sensation Tactile Decrease] : light touch was intact [1+] : Ankle jerk left 1+ [FreeTextEntry1] : Face is masked with decreased blinking rate. Voice is hypophonic. Saccades are mildly slowed. \par Severe stooped posture while sitting, with right head tilt and right shoulder droop, anterocollis.\par No tremors.\par Moderate-severe bradykinesia bilaterally, left more than right.\par Mild rigidity on the right. \par Impaired leg agility and toe tapping bilaterally, left more than right.\par Patient refused to try to stand with two person assist.\par Unable to assess gait.

## 2022-12-30 NOTE — HISTORY OF PRESENT ILLNESS
[FreeTextEntry1] : Tato Vera is a 62 year old M with a PMHx of Bipolar Disorder who presents for initial evaluation in the Movement Disorders Clinic at [facility]. He was previously seeing Dr. Carlo Snowden. He is accompanied by his niece, a psychiatric nurse practitioner. \par \par He was diagnosed with Parkinson's disease a few months ago. \par He is on Risperidone and had tardive dyskinesias. \par \par He had a Denice scan that was negative.\par He has been in a home since 2010. Initially was there for rehab. He was in group homes before that and [facility].\par \par He has slight tremors that started a few weeks ago in the hands and legs. He is able to walk to the bathroom. He uses a walker. He requires assistance. Sometimes he shuffles when walking. He sometimes feels like his feet are stuck to the floor, especially the right leg.\par No trouble turning or adjusting in bed at night. He has aids that help him get dressed. His movements are slower.\par He fell off the wheelchair last night.\par \par He has a good sense of smell. \par He cannot write\par No trouble swallowing. \par His speech is the same if not better.\par He has constipation. He has bowel movement every other day. \par No trouble with urination. He wears a diaper at night\par No trouble sleeping. No history of talking in sleep or acting out dreams.\par Sometimes he has dizziness/lightheadedness but he is generally sitting.\par Memory is mildly affected.\par He is depressed. \par \par Family history: unremarkable\par Not currently getting PT\par \par Current meds:\par Risperdal 1.5mg BID - started at least 2010 in [place]\par Prscar\par Depakote 750mg daily\par Simvastatin\par Synthroid\par Depakote 500mg 2 tabs at bedtime\par Fioricet PRN headache\par Dicyclomine 20mg 4 times a day\par Clonazepam 0.5mg at 6p\par Prilosec\par Simethicone\par Melatonin 3mg \par Dulcolax\par Carbidopa-10-100mg 1 tab at 3 times a day at 7a-1130a-4p\par Methocarbamol\par Neurontin 300mg BID

## 2023-03-02 ENCOUNTER — APPOINTMENT (OUTPATIENT)
Dept: NEUROLOGY | Facility: CLINIC | Age: 63
End: 2023-03-02

## 2023-04-25 NOTE — ED BEHAVIORAL HEALTH ASSESSMENT NOTE - ABORTED (SELF-INTERRUPTED) ATTEMPT:
[facility] Brief Operative Note    Pre-operative diagnosis: AS (aortic stenosis) [I35.0]  Ascending aortic Aneurysm (H) [I72.9]   Post-operative diagnosis Same   Procedure: Procedure(s):  MEDIAN STERNOTOMY, CARDIOPULMONARY BYPASS, AORTIC VALVE REPLACEMENT USING PIERRE RESILA 21 MM VALVE, ASCENDING AORTA  ANEURYSM REPAIR USING 32 MM HEMASHIELD GRAFT, TRANSESOPHAGEAL ECHOCARDIOGRAM PERFORMED BY ANESTHESIA STAFF   Surgeon(s): Surgeon(s) and Role:     * Gm Solis MD - Primary     * Elizabet Rojas PA-C - Assisting   Estimated blood loss: See anesthesia log    Specimens: ID Type Source Tests Collected by Time Destination   1 : Aortic Wall Tissue Aneurysm, Aortic SURGICAL PATHOLOGY EXAM Gm Solis MD 4/25/2023 10:33 AM    2 : Aortic Valve Tissue Aorta SURGICAL PATHOLOGY EXAM Gm Solis MD 4/25/2023 10:36 AM       Findings: See op report. Drains: two 32F mediastinal drains, 24F vipul drain behind heart.     Elizabet Daly PA-C  Cardiothoracic Surgery  Pager [phone]  April 25, 2023        None known

## 2023-08-23 NOTE — H&P ADULT - PROBLEM SELECTOR PROBLEM 3
NPH (normal pressure hydrocephalus) Diplopia 8 Mm Punch Excision Text: A 8 mm punch biopsy was used to excise the lesion to the level of the subcutaneous fat.  Blunt dissection was used to free the lesion from the surrounding tissues and the lesion was removed.

## 2023-08-23 NOTE — PROVIDER CONTACT NOTE (OTHER) - ACTION/TREATMENT ORDERED:
Abuterol neb, tylenol 650mg PO per EMAR, O2 2 NC. Chest CT pending. Pt now resting with improved comfort. Will continue to monitor.
MD Evert Tariq aware.  Will order medication.
Recheck w/ AM labs. No further interventions at this time. Pt in no distress, resting comfortably at this time.
NP is aware and says changing the diet to clears would not be sufficient nutrition, but she will order pain medication.
NP made aware. Okay to give both medication now
Normal

## 2024-01-26 ENCOUNTER — APPOINTMENT (OUTPATIENT)
Dept: NEUROLOGY | Facility: CLINIC | Age: 64
End: 2024-01-26
Payer: MEDICARE

## 2024-01-26 VITALS
OXYGEN SATURATION: 97 % | HEART RATE: 71 BPM | WEIGHT: 180 LBS | DIASTOLIC BLOOD PRESSURE: 82 MMHG | TEMPERATURE: 97.6 F | SYSTOLIC BLOOD PRESSURE: 114 MMHG | HEIGHT: 71 IN | BODY MASS INDEX: 25.2 KG/M2

## 2024-01-26 DIAGNOSIS — Z74.09 OTHER REDUCED MOBILITY: ICD-10-CM

## 2024-01-26 DIAGNOSIS — G20.C PARKINSONISM, UNSPECIFIED: ICD-10-CM

## 2024-01-26 PROCEDURE — 99214 OFFICE O/P EST MOD 30 MIN: CPT

## 2024-01-26 RX ORDER — DICLOFENAC SODIUM 10 MG/G
1 GEL TOPICAL
Refills: 0 | Status: ACTIVE | COMMUNITY

## 2024-01-26 RX ORDER — MULTIVIT-MIN/IRON/FOLIC ACID/K 18-600-40
500 CAPSULE ORAL DAILY
Refills: 0 | Status: COMPLETED | COMMUNITY
End: 2024-01-26

## 2024-01-26 RX ORDER — GLUCOSAMINE HCL/CHONDROITIN SU 500-400 MG
3 CAPSULE ORAL
Qty: 180 | Refills: 0 | Status: COMPLETED | COMMUNITY
Start: 2018-08-28 | End: 2024-01-26

## 2024-01-26 RX ORDER — LACTULOSE 10 G/15ML
10 SOLUTION ORAL 3 TIMES DAILY
Qty: 2700 | Refills: 0 | Status: COMPLETED | COMMUNITY
Start: 2021-06-18 | End: 2024-01-26

## 2024-01-26 RX ORDER — CARBIDOPA AND LEVODOPA 10; 100 MG/1; MG/1
10-100 TABLET ORAL
Refills: 0 | Status: ACTIVE | COMMUNITY

## 2024-01-26 RX ORDER — DICYCLOMINE HYDROCHLORIDE 20 MG/1
20 TABLET ORAL
Refills: 0 | Status: ACTIVE | COMMUNITY

## 2024-01-26 RX ORDER — OTC SKIN PROTECTANT DRUG PRODUCTS 0.04 G/G
OINTMENT TOPICAL
Refills: 0 | Status: ACTIVE | COMMUNITY

## 2024-01-26 RX ORDER — ALBUTEROL SULFATE 90 UG/1
108 (90 BASE) AEROSOL, METERED RESPIRATORY (INHALATION)
Refills: 0 | Status: COMPLETED | COMMUNITY
End: 2024-01-26

## 2024-01-26 RX ORDER — MULTIVITAMIN
TABLET ORAL DAILY
Refills: 0 | Status: COMPLETED | COMMUNITY
Start: 2018-12-06 | End: 2024-01-26

## 2024-01-26 RX ORDER — GABAPENTIN 300 MG/1
300 CAPSULE ORAL
Refills: 0 | Status: ACTIVE | COMMUNITY

## 2024-01-26 RX ORDER — METHOCARBAMOL 500 MG/1
500 TABLET, FILM COATED ORAL
Refills: 0 | Status: ACTIVE | COMMUNITY

## 2024-01-26 RX ORDER — CHLORHEXIDINE GLUCONATE 4 %
5 LIQUID (ML) TOPICAL
Refills: 0 | Status: ACTIVE | COMMUNITY

## 2024-01-26 RX ORDER — SIMETHICONE 80 MG/1
80 TABLET, CHEWABLE ORAL
Refills: 0 | Status: ACTIVE | COMMUNITY

## 2024-01-26 RX ORDER — ERGOCALCIFEROL 1.25 MG/1
1.25 MG CAPSULE, LIQUID FILLED ORAL
Refills: 0 | Status: ACTIVE | COMMUNITY

## 2024-01-26 RX ORDER — ACETAMINOPHEN 325 MG/1
325 TABLET, FILM COATED ORAL
Refills: 0 | Status: ACTIVE | COMMUNITY

## 2024-01-26 RX ORDER — BUTALBITAL, ACETAMINOPHEN AND CAFFEINE 50; 325; 40 MG/1; MG/1; MG/1
50-325-40 CAPSULE ORAL TWICE DAILY
Qty: 60 | Refills: 5 | Status: COMPLETED | COMMUNITY
End: 2024-01-26

## 2024-01-26 RX ORDER — MELOXICAM 7.5 MG/1
7.5 TABLET ORAL
Refills: 0 | Status: ACTIVE | COMMUNITY

## 2024-01-26 RX ORDER — SIMETHICONE 125 MG/1
125 TABLET, CHEWABLE ORAL DAILY
Qty: 30 | Refills: 0 | Status: COMPLETED | COMMUNITY
Start: 2021-06-18 | End: 2024-01-26

## 2024-01-26 NOTE — HISTORY OF PRESENT ILLNESS
[FreeTextEntry1] : Tato Vera is a 63 year old M with a PMHx of Bipolar Disorder who presents for follow up in the Movement Disorders Clinic at [facility]. He was previously seeing Dr. Carlo Snowden. He is accompanied by his sister who provides history.  Interval history: Since the last visit in December 2022, he has had a deterioration. He started leaning to the left for the last year.  He has some trouble swallowing. He has intermittent constipation. No trouble sleeping at night. No talking in sleep or acting out dreams. He often refuses therapy and is not currently in therapy.   Current meds: Risperdal 1.5mg BID - started at least 2010 in [place] Proscar Depakote 750mg daily, Depakote 500mg 2 tabs at bedtime Simvastatin Synthroid Clonazepam 0.5mg at 6p Melatonin 3mg  Carbidopa-10-100mg 1 tab at 3 times a day at 7a-1130a-4p Neurontin 300mg BID   Initial history: He was diagnosed with Parkinson's disease a few months ago.  He is on Risperidone and had tardive dyskinesias.   He had a Denice scan that was negative. He has been in a home since 2010. Initially was there for rehab. He was in group homes before that and [facility].  He has slight tremors that started a few weeks ago in the hands and legs. He is able to walk to the bathroom. He uses a walker. He requires assistance. Sometimes he shuffles when walking. He sometimes feels like his feet are stuck to the floor, especially the right leg. No trouble turning or adjusting in bed at night. He has aids that help him get dressed. His movements are slower. He fell off the wheelchair last night.  He has a good sense of smell.  He cannot write No trouble swallowing.  His speech is the same if not better. He has constipation. He has bowel movement every other day.  No trouble with urination. He wears a diaper at night No trouble sleeping. No history of talking in sleep or acting out dreams. Sometimes he has dizziness/lightheadedness but he is generally sitting. Memory is mildly affected. He is depressed.   Family history: unremarkable Not currently getting PT  Current meds: Risperdal 1.5mg BID - started at least 2010 in [place] Proscar Depakote 750mg daily Simvastatin Synthroid Depakote 500mg 2 tabs at bedtime Fioricet PRN headache Dicyclomine 20mg 4 times a day Clonazepam 0.5mg at 6p Prilosec Simethicone Melatonin 3mg  Dulcolax Carbidopa-10-100mg 1 tab at 3 times a day at 7a-1130a-4p Methocarbamol Neurontin 300mg BID

## 2024-01-26 NOTE — DISCUSSION/SUMMARY
[FreeTextEntry1] : Tato Vera is a 63 year old M with a PMHx of Bipolar Disorder who presents for follow up in the Movement Disorders Clinic at [facility]. He was previously seeing Dr. Carlo Snowden. He is accompanied by his sister who provides history.  The patient's physical examination is consistent with Parkinsonism and he has had a significant progression since the first visit in December 2022. He had a negative Denice scan in the past, suggesting drug induced Parkinsonism. Discussed the risks and benefits of medication adjustments and discussed lifestyle modifications in detail. Family does not want to risk worsening his psychosis.  Plan: - Increase Benztropine from 0.5mg to 1mg BID. Artane is also an alternative option. Monitor for drowsiness - Change Sinemet 10-100mg to 25-100mg 1 tab at 7a-11a-4p. Levodopa can also be considered to be increased but carries the risk of worsening psychosis. - PT/OT/SLP referrals - Encouraged to increase exercise and physical activity and maintain an active social and intellectual life. - Stretching is going to be important. - Can consider repeating FDOPA scan based on medication responsiveness  Patient will follow up with Movement Disorders  All questions were answered to their satisfaction. I spent 30 minutes on this encounter.

## 2024-01-26 NOTE — PHYSICAL EXAM
[Person] : oriented to person [Place] : oriented to place [Time] : oriented to time [Concentration Intact] : normal concentrating ability [Comprehension] : comprehension intact [Cranial Nerves Oculomotor (III)] : extraocular motion intact [Cranial Nerves Trigeminal (V)] : facial sensation intact symmetrically [Cranial Nerves Facial (VII)] : face symmetrical [Cranial Nerves Vestibulocochlear (VIII)] : hearing was intact bilaterally [Cranial Nerves Glossopharyngeal (IX)] : tongue and palate midline [Cranial Nerves Hypoglossal (XII)] : there was no tongue deviation with protrusion [Involuntary Movements] : no involuntary movements were seen [Sensation Tactile Decrease] : light touch was intact [1+] : Ankle jerk left 1+ [FreeTextEntry1] : Face is masked with decreased blinking rate. Voice is moderately hypophonic. Saccades are mildly slowed.  Severe truncal dystonia, leaning the left, with left laterocollis, partial active range of motion of the neck but not to midline. Has restraints on the wheelchair to keep him upright and a neck collar. No tremors. Moderate-severe bradykinesia bilaterally, left more than right. Slight rigidity bilaterally Impaired leg agility and toe tapping bilaterally, left more than right. Unable to stand.

## 2024-02-27 NOTE — ED ADULT NURSE NOTE - NS ED NOTE ABUSE RESPONSE YN
Student's Name:   Shruti Garcia Birth Date  2008  Sex  female  Race/Ethnicity   School/Grade Level/ID#     Address:   [address] [place] [id number]  Parent/Guardian             Telephone#                                            Home:                               Work:   IMMUNIZATIONS: To be completed by health care provider. The mo/da/yr for every dose administered is required. If a specific vaccine is medically contraindicated, a separate written statement must be attached by the health care responsible for completing the health examination explaining the medical reason for the contraindication.      Immunization History   Administered Date(s) Administered   • COVID Pfizer 12Y+ (Requires Dilution) 05/22/2021, 06/12/2021   • DTaP(INFANRIX) 06/24/2014   • DTaP/HIB/IPV 2009, 2009, 2009, 07/13/2010   • HPV 9-Valent 08/17/2021, 11/28/2022   • Hep A, ped/adol, 2 dose 04/26/2010, 12/03/2010   • Hep B, adolescent or pediatric 2009, 2009, 01/18/2010   • Influenza, Unspecified Formulation 2009, 01/18/2010, 12/03/2010, 10/10/2014, 12/30/2019   • Influenza, quadrivalent, multi-dose 12/30/2019   • Influenza, quadrivalent, preserve-free 01/04/2021, 11/28/2022, 12/26/2023   • Influenza, seasonal, injectable, preservative free 2009, 01/18/2010, 12/03/2010   • MMR 04/26/2010, 04/26/2020   • Measles Mumps Rubella Varicella 06/24/2014   • Meningococcal Conjugate MCV4O (Menveo) 08/26/2020   • Pneumococcal Conjugate 13 Valent Vacc (Prevnar 13) 2009, 2009, 2009, 07/13/2010   • Polio, INACTIVATED 06/24/2014   • Rotavirus - pentavalent 2009, 2009, 2009   • Tdap 08/26/2020   • Varicella 04/26/2010            Health care provider (MD, DO, APN, PA, school health professional, health official) verifying above immunization history must sign below. If adding dates to the above immunization history section, put your initials by date(s) and sign  here.)  Signature                                Date 02/27/2024  _____________________________________________________________________________________   ALTERNATIVE PROOF OF IMMUNITY   1. Clinical diagnosis (measles, mumps, hepatitis B) is allowed when verified by physician and supported with lab confirmation.  Attach copy of lab result.    * MEASLES (Rubeola)  MO   DA  YR          **MUMPS  MO   DA  YR             HEPATITIS B  MO   DA   YR           VARICELLA  MO   DA  YR      2. History of varicella (chickenpox) disease is acceptable if verified by health care provider, school health professional or health official.  Person signing below verifies that the parent/guardian’s description of varicella disease history is indicative of past infection and is accepting such history as documentation of disease.  Date of Disease                                  Signature                                                           Title   3. Laboratory Evidence of Immunity (check one)      __ Measles*         __ Mumps**        __ Rubella        __Varicella    (Attach copy of lab result)         *All measles cases diagnosed on or after July 1, 2002, must be confirmed by laboratory evidence.      **All mumps cases diagnosed on or after July 1, 2013, must be confirmed by laboratory evidence.     Completion of Alternative 1 or 3 MUST be accompanied by Labs & Physician Signature: ___________________________  Physician Statements of Immunity MUST be submitted to [facility] for review.     Certificates of [employer] Exemption to Immunizations or Physician Medical Statements of Medical Contraindication Are Reviewed   and Maintained by the School Authority  (11/2015)                                         (COMPLETE BOTH SIDES)                                  Approved [facility] 3/2016  HEALTH HISTORY      TO BE COMPLETED AND SIGNED BY PARENT/GUARDIAN AND VERIFIED BY HEALTH CARE PROVIDER  ALLERGIES: (Food, drug, insect, other) has No Known  Allergies.   MEDICATION: (List all prescribed or taken on a regular basis.) currently has no medications in their medication list.   Diagnosis of asthma?  Child wakes during night coughing? Yes    No  Yes    No  Loss of function of one of paired  organs?(eye/ear/kidney/testicle) Yes    No    Birth defects? Yes    No  Hospitalizations? Yes    No    Developmental delay? Yes    No   When? What for? Yes    No    Blood disorders? Hemophilia,  Sickle Cell, Other? Explain. Yes    No  Surgery? (List all.)  When? What for? Yes    No    Diabetes? Yes    No  Serious injury or illness? Yes    No    Head injury/Concussion/Passed out? Yes    No  TB skin test positive (past/present)? * Yes    No *If yes, refer to local [facility] dept   Seizures? What are they like?  Yes    No  TB disease (past or present)? * Yes    No *If yes, [facility]   Heart problem/Shortness of breath?  Yes    No  Tobacco use (type, frequency)?  Yes    No    Heart murmur/High blood pressure? Yes    No  Alcohol/Drug use?  Yes    No    Dizziness or chest pain with exercise? Yes    No  Family history of sudden death  before age 50? (Cause?) Yes    No    Eye/Vision problems? ______           __Glasses          __Contacts  Last exam by eye doctor ______  Other concerns? (crossed eye, drooping lids, squinting, difficulty reading) Dental?   __ Braces     __ Bridge     __ Plate   __Other   Ear/Hearing problems? Yes    No  Information may be shared with appropriate personnel for health and educational purposes.   Bone/Joint problem/injury/scoliosis? Yes    No  Parent/Guardian  Signature                                               Date   PHYSICAL EXAMINATION REQUIREMENTS   Entire section below to be completed by MD/DO/APN/PA Head Circumference if <2-3 years old - /68 (BP Location: RUE - Right upper extremity, Patient Position: Sitting, Cuff Size: Regular)   Pulse 89   Temp 97.8 °F (36.6 °C) (Temporal)   Ht 5' 2\" (1.575 m)   Wt 51.6 kg (113 lb  12.1 oz)   LMP 02/07/2024 (Approximate)   BMI 20.81 kg/m²   BSA 1.5 m²    61.8 %ile (Z= 0.30) based on CDC (Girls, 2-20 Years) BMI-for-age based on BMI available as of 2/27/2024.   DIABETES SCREENING (NOT REQUIRED FOR ) BMI>85% age/sex: No     And any two of the following: Family History: No  Ethnic Minority: No    Signs of Insulin Resistance (hypertension, dyslipidemia, polycystic ovarian syndrome, acanthosis nigricans): No  At Risk: No   LEAD RISK QUESTIONNAIRE Required for children age 6 months through 6 years enrolled in licensed or public school operated day care, , nursery school and/or . (Blood test required if resides in [place] or high risk zip code.)  Questionnaire Administered? Yes  Blood Test Indicated? No   Blood Test Date/Result:    TB SKIN OR BLOOD TEST Recommended only for children in high-risk groups including children immunosuppressed due to HIV infection or other conditions, frequent travel to or born in high prevalence countries or those exposed to adults in high-risk categories. See CDC guidelines. http://www.cdc.gov/tb/publications/factsheets/testing/TB_testing.htm.  Test Needed: No     Test performed: No                          Skin Test:    Date Read              /      /             Result:   Positive__      Negative__        mm________                          Blood Test: Date Reported       /      /               Result:  Positive__      Negative__      Value________  LAB TESTS (recommended) Date/Result  Date/Results   Hemoglobin or Hematocrit 13.2 (8/26/2020) Sickle Cell (when indicated)    Urinalysis NA Developmental Screening Tool Normal - ASQ        SYSTEM REVIEW Normal  Comments/Follow-up/Needs  Normal Comments/Follow-up/Needs   Skin  Yes  Endocrine Yes    Ears Yes                  Screening Result Gastrointestinal Yes    Eyes Yes                  Screening Result: Genito-Urinary Yes                                      LMP: 2/7/2024   Nose Yes   Neurologic Yes    Throat Yes  Musculoskeletal Yes    Mouth/Dental Yes  Spinal Exam Yes    Cardiovascular/HTN Yes  Nutritional status Yes    Respiratory Yes Diagnosis of Asthma: No   Mental Health Yes    Currently Prescribed Asthma Medication:        No  Quick-relief medication (e.g. Short Acting Beta Antagonist)        No  Controller medication (e.g. inhaled corticosteroid) Other     NEEDS/MODIFICATIONS required in the school setting: No restrictions DIETARY Needs/Restrictions: No restrictions   SPECIAL INSTRUCTIONS/DEVICES e.g. safety glasses, glass eye, chest protector for arrhythmia, pacemaker, prosthetic device, dental bridge, false teeth, athletic support/cup: No restrictions   MENTAL HEALTH/OTHER Is there anything else the school should know about this student?  If you would like to discuss this student’s health with school or school health personnel:  Not needed   EMERGENCY ACTION needed while at school due to child’s health condition (e.g. ,seizures, asthma, insect sting, food, peanut allergy, bleeding problem, diabetes, heart problem)?   No   On the basis of the examination on this day, I approve this child’s participation in                                (If No or Modified please attach explanation.)  PHYSICAL EDUCATION:  Yes                               INTERSCHOL[employer]IC SPORTS   Yes   Print Name  Shy Thrasher MD         Signature     Date: 2/27/2024   Address:  [facility] [place] [address]  [facility] [place] [id number]  [facility] [id number] [phone]         (Complete Both Sides)     Approved [facility] 3/2016   Yes

## 2024-04-08 NOTE — PATIENT PROFILE ADULT. - FALL HARM RISK TYPE OF ASSESSMENT
[facility] MEDICINE DISCHARGE SUMMARY   Patient: Belle Watt  Discharge Physician: Shaheen Roche, *   YOB: 1965  Admission Date: 4/6/2024    MRN: [id number]  Discharge date: 4/8/2024   Admitting Physician: Shaheen Roche MD Inpatient LOS: 2      Indication for Admission:  Chief Complaint   Patient presents with    Abdominal Pain       Discharge Diagnoses:   Acute cholecystitis   Common bile duct dilation  Abdominal pain- resolved  Elevated blood pressures    Chronic  Alcohol abuse  Tobacco use    Hospital Course:     Belle Watt is a 58 year old female with a past medical history of alcohol and tobacco use who presented to the ED with cc of abdominal pain. Abdominal US with gallstones with wall thickening and hyperemia; gaines sign positive. Concern for acute cholecystitis. Also with moderate distension of the CBD measuring 17 mmm. GI and general surgery were consulted. GI recommended US/ERCP,  in which she declined, stating \"I need to get home to care for my animals, I need to apply for a job, I have no insurance and can't afford this.\" GI stated she can f/u outpatient for ERCP. General surgery recommended cholecystectomy, in which patient declined as her symptoms improved. Thus patient left AMA as given acute cholecystitis it was recommended she stay inpatient for procedure. She was explained the risks of leaving given current condition, including death. She was alert and oriented x 3 when making this decision. AMA paperwork signed and patient left.        Consults:   GI, general surgery    Discharge Exam:  Visit Vitals  BP (!) 168/73   Pulse 83   Temp 97.8 °F (36.6 °C) (Oral)   Resp 19   Ht 5' 5\" (1.651 m)   Wt 52.8 kg (116 lb 6.5 oz)   LMP 05/05/2014   SpO2 93%   BMI 19.37 kg/m²     Physical Exam  Vitals reviewed.   Constitutional:       General: She is not in acute distress.     Appearance: She is not ill-appearing, toxic-appearing or 
diaphoretic.   HENT:      Head: Normocephalic.   Cardiovascular:      Rate and Rhythm: Normal rate and regular rhythm.      Pulses: Normal pulses.   Pulmonary:      Effort: Pulmonary effort is normal. No respiratory distress.      Breath sounds: Normal breath sounds. No stridor. No wheezing, rhonchi or rales.   Chest:      Chest wall: No tenderness.   Abdominal:      General: Bowel sounds are normal.      Palpations: Abdomen is soft.      Tenderness: There is no abdominal tenderness, guarding, or rebound.   Musculoskeletal:         General: No swelling or tenderness.      Cervical back: Neck supple.      Right lower leg: No edema.      Left lower leg: No edema.   Skin:     General: Skin is warm.   Neurological:      Mental Status: She is alert and oriented to person, place, and time.   Psychiatric:         Mood and Affect: Mood normal.         Behavior: Behavior normal.         Thought Content: Thought content normal.         Judgment: Judgment normal.       Significant Diagnostic Studies:   WBC (K/mcL)   Date Value   04/08/2024 6.0     RBC (mil/mcL)   Date Value   04/08/2024 4.38     HCT (%)   Date Value   04/08/2024 41.2     HGB (g/dL)   Date Value   04/08/2024 14.0     PLT (K/mcL)   Date Value   04/08/2024 335       Sodium (mmol/L)   Date Value   04/08/2024 143     Potassium (mmol/L)   Date Value   04/08/2024 3.8     Chloride (mmol/L)   Date Value   04/08/2024 105     Glucose (mg/dL)   Date Value   04/08/2024 84     Calcium (mg/dL)   Date Value   04/08/2024 8.5     Carbon Dioxide (mmol/L)   Date Value   04/08/2024 27     BUN (mg/dL)   Date Value   04/08/2024 3 (L)     Creatinine (mg/dL)   Date Value   04/08/2024 0.61       Results for orders placed or performed during the hospital encounter of 04/06/24 (from the past 72 hour(s))   US LIVER GALLBLADDER PANCREAS (RUQ)    Impression    IMPRESSION:    1. Gallstone with wall thickening and hyperemia. King sign reported as  positive. Findings concerning for acute 
cholecystitis.  2. Moderate distention of the common bile duct measuring 17 mm. No common  bile duct stones visualized. Consider correlation with ERCP/MRCP    Electronically Signed by: Willie Nice MD  Signed on: 4/6/2024 3:58 PM  Created on Workstation ID: [id number]  Signed on Workstation ID: [id number]   XR CHEST PA OR AP 1 VIEW    Impression    IMPRESSION: No acute cardiopulmonary disease. No interval change.      Electronically Signed by: Zac Tillman MD  Signed on: 4/6/2024 6:41 PM  Created on Workstation ID: [id number]  Signed on Workstation ID: [id number]       Disposition: AMA    Advanced Directives/Goals of Care:  Patient is decisional: Yes  Power Of  designee/healthcare agent - Not activated:  Code Status: Full Resuscitation          Total time spent today on this visit is >30 minutes which includes reviewing tests in preparation to see patient, obtaining and reviewing separately obtained history, performing a medically appropriate exam and evaluation, counseling and educating the patient/family/caregiver, ordering medications, tests or procedures, and communicating with other healthcare professionals.     This patient and treatment plan were discussed and agreed upon with my attending physician Shaheen Roche, * .    Noni Suarez PA-C  04/08/24  9:04 AM    Physician Note     I saw and examined the patient. The patients symptoms, physical findings, and studies, are as documented above by the Physician Assistant;  I discussed the patients treatment plans with the patient and family.  Data Reviewed by me included: I personally reviewed all pertinent labs and images done over the past 24 hours.    Portions of the Physical Examination were performed by the Physician Assistant and portions of the Physical Examination and complete [facility] Medical Decision Making were done by me.  My Findings include:  Patient seen and examined  No chest pain  No sob  She states she does not want eUS/ERCP 
  Refuses cholecystectomy   AWARE OF RISK OF AMA INCLUDING DEATH   WILL SIGN AMA FORM  RN IS AWARE     My assessment and plan are as documented above by the Physician Assistant, and I have reviewed and edited the above note as needed.    My Total Physician time of 35 minutes was greater than the Physician Assistant's time.    Shaheen Roche MD  4/8/2024  1:56 PM                 
Admission
No

## 2024-04-25 NOTE — PATIENT PROFILE ADULT. - AS SC BRADEN ACTIVITY
"Chief Complaint  Follow-up and Hypothyroidism    Subjective        David Comer Sr. presents to [facility]  Hypothyroidism      Pt presents today for refills, labs and  HTN- no CP or HA  HLD - on med and stable  Hypothyroidism- stable with med   Vit D deficiency- stable.    Objective   Vital Signs:  /84 (BP Location: Left arm, Patient Position: Sitting, Cuff Size: Large Adult)   Pulse 54   Temp 97.5 °F (36.4 °C) (Tympanic)   Resp 18   Ht 174 cm (68.5\")   Wt 92.5 kg (204 lb)   SpO2 96%   BMI 30.56 kg/m²   Estimated body mass index is 30.56 kg/m² as calculated from the following:    Height as of this encounter: 174 cm (68.5\").    Weight as of this encounter: 92.5 kg (204 lb).               Physical Exam  Vitals and nursing note reviewed.   Constitutional:       Appearance: Normal appearance. He is well-developed.   Cardiovascular:      Rate and Rhythm: Normal rate.      Heart sounds: Normal heart sounds. No murmur heard.  Pulmonary:      Effort: Pulmonary effort is normal. No respiratory distress.      Breath sounds: Normal breath sounds. No stridor. No wheezing or rhonchi.   Neurological:      General: No focal deficit present.      Mental Status: He is alert and oriented to person, place, and time. He is not disoriented.   Psychiatric:         Mood and Affect: Mood normal.         Behavior: Behavior normal.        Result Review :                     Assessment and Plan     Diagnoses and all orders for this visit:    1. Essential hypertension (Primary)  -     Comprehensive Metabolic Panel    2. Mixed hyperlipidemia  -     Lipid Panel    3. Acquired hypothyroidism  -     TSH Rfx On Abnormal To Free T4    4. Vitamin D deficiency  -     Vitamin D,25-Hydroxy    5. Erectile dysfunction, unspecified erectile dysfunction type  -     sildenafil (REVATIO) 20 MG tablet; Take one to five tablets one hour prior to event  Dispense: 60 tablet; Refill: 5             Follow Up     Return for " lolis after 5/8/24 mychart and regular 4 month follow up..  Patient was given instructions and counseling regarding his condition or for health maintenance advice. Please see specific information pulled into the AVS if appropriate.       Refills, labs and work on diet and exercise.    Start med for ED and is aware of interactions.     (3) walks occasionally

## 2024-05-16 NOTE — ED PROVIDER NOTE - NS ED ROS FT
Respiratory
General: denies fever, chills  HENT: denies nasal congestion, rhinorrhea  Eyes: denies visual changes, blurred vision  CV: denies chest pain, palpitations  Resp: denies difficulty breathing, cough  Abdominal: denies nausea, vomiting, abdominal pain  MSK: denies muscle aches, leg swelling  Neuro: + weakness, + headaches, + numbness  Skin: denies rashes, bruises

## 2024-05-23 NOTE — H&P ADULT - NSHPPOACENTRALVENOUSCATHETER_GEN_ALL_CORE
[FreeTextEntry1] :  YAMILA KONG, am scribing for and in the presence of  in the following sections: HISTORY OF PRESENT ILLNESS, PAST MEDICAL/FAMILY/SOCIAL HISTORY, REVIEW OF SYSTEMS, VITAL SIGNS, PHYSICAL EXAM, ASSESSMENT/PLAN on 05/21/2024.        no

## 2024-08-21 NOTE — ED PROVIDER NOTE - DOMESTIC TRAVEL HIGH RISK QUESTION
Cardiology Daily Progress Note    Assessment and plan  #1 possible vegetation versus thrombus on the ICD lead will evaluate with a transesophageal echo tomorrow.  Spoke to the patient and the family.  #2 acute on chronic kidney disease currently undergoing hemodialysis with a via right IJ permacath.  Currently stable.  Feels much much better since his dialysis.  #3 LV dysfunction no acute changes were noted.  Patient is currently on carvedilol therapy.  Not on Jardiance secondary to acute nature of his illness not on spironolactone or Entresto secondary to chronic kidney disease issues with acute exacerbation requiring hemodialysis.  #4 status post ICD with  well-functioning ICD.  #5 coronary disease status post LAD stenting in the remote past currently stable.  Plan as above continue current medical management.  He has improved significantly since the hemodialysis.  No complications were noted sitting up in the chair.  Ambulating in the room also.  Hopefully we can ambulate with assist today also.  Still requiring nasal cannula oxygen supplementation however.  Spoke to the wife also at bedside.  Based on the able make further recommendations.  Spoke to Dr. Zelaya who is going to do the transesophageal echo tomorrow.  Overall prognosis still somewhat guarded given the significant comorbidities.    Overview of  Current Cardiology Problems and Plans:  [unfilled]  Principal Problem:    Endocarditis      Subjective   Review of Systems  General ROS:  Cardiovascular ROS: Feels much better no chest pain no PND orthopnea no syncope no dizziness diaphoresis no palpitations no recent discharges from the ICD.  No neurological events no seizure activity was noted no history of any embolic phenomena were noted no bleeding episodes.  Stable.    Objective   Vital signs in last 24 hours:  Vitals with min/max:      Vital Last Value 24 Hour Range   Temperature 97.5 °F (36.4 °C) (08/21/24 0730) Temp  Min: 96.3 °F (35.7 °C)  Max:  98.1 °F (36.7 °C)   Pulse 66 (08/21/24 0730) Pulse  Min: 60  Max: 74   Respiratory 16 (08/21/24 0730) Resp  Min: 14  Max: 32   Non-Invasive  Blood Pressure 122/45 (08/21/24 0730) BP  Min: 97/44  Max: 122/45   Pulse Oximetry 96 % (08/21/24 0730) SpO2  Min: 95 %  Max: 100 %   Arterial   Blood Pressure   No data recorded       Intake/Output last 3 shifts:    Intake/Output Summary (Last 24 hours) at 8/21/2024 1003  Last data filed at 8/21/2024 0800  Gross per 24 hour   Intake 340 ml   Output 3350 ml   Net -3010 ml       Physical Exam  Constitutional: alert and oriented  Eyes: Pupils reactive  Ears, nose, mouth, throat: Mouth moist  Cardiovascular: Normal S1 and S2,   Respiratory: lung fields clear  Chest: No deformity  GI: Bowel sounds active    Integument:  Skin tone good, no edema  Neuro: No focal deficits  Psych: Appropriate mood    Medications:  Current Facility-Administered Medications   Medication Dose Route Frequency Provider Last Rate Last Admin    bumetanide (BUMEX) injection 2 mg  2 mg Intravenous Q12H Khadra Simons MD   2 mg at 08/21/24 0317    ampicillin (OMNIPEN) 2,000 mg in sodium chloride 0.9 % 100 mL IVPB  2,000 mg Intravenous Q12H Juan Guevara  mL/hr at 08/20/24 2311 2,000 mg at 08/20/24 2311    docusate sodium (COLACE) capsule 100 mg  100 mg Oral Daily Caitlyn Domingo MD   100 mg at 08/21/24 0834    docusate sodium-sennosides (SENOKOT S) 50-8.6 MG 1 tablet  1 tablet Oral Nightly Caitlyn Domingo MD   1 tablet at 08/18/24 2118    heparin (porcine) injection 5,000 Units  5,000 Units Subcutaneous 3 times per day Mariza Harding DO   5,000 Units at 08/21/24 0540    polyethylene glycol (MIRALAX) packet 17 g  17 g Oral Daily Mariza Harding DO        B complex-vitamin C-folic acid (NEPHRO-MARYANN) tablet 0.8 mg  1 tablet Oral Daily Yosi Michele MD   0.8 mg at 08/21/24 0834    cefTRIAXone (ROCEPHIN) syringe 2,000 mg  2,000 mg Intravenous 2 times per day Juan Guevara MD   2,000 mg at 08/21/24  0834    allopurinol (ZYLOPRIM) tablet 100 mg  100 mg Oral Daily Millicent Mueller MD   100 mg at 08/21/24 0834    [Held by provider] amLODIPine (NORVASC) tablet 10 mg  10 mg Oral Daily Millicent Mueller MD   10 mg at 08/17/24 0853    atorvastatin (LIPITOR) tablet 80 mg  80 mg Oral Daily Millicent Mueller MD   80 mg at 08/20/24 2026    carvedilol (COREG) tablet 3.125 mg  3.125 mg Oral BID  Millicent Mueller MD   3.125 mg at 08/21/24 0834    [Held by provider] empagliflozin (JARDIANCE) tablet 10 mg  10 mg Oral Daily Millicent Mueller MD        insulin glargine (LANTUS) injection 10 Units  10 Units Subcutaneous Nightly Millicent Mueller MD   10 Units at 08/20/24 2033    isosorbide mononitrate (IMDUR) ER tablet 30 mg  30 mg Oral Daily Millicent Mueller MD   30 mg at 08/21/24 0834    pantoprazole (PROTONIX) EC tablet 20 mg  20 mg Oral QAM AC Millicent Mueller MD   20 mg at 08/21/24 0540    [Held by provider] spironolactone (ALDACTONE) tablet 50 mg  50 mg Oral Daily Millicent Mueller MD   50 mg at 08/17/24 0853    sodium chloride 0.9 % injection 2 mL  2 mL Intracatheter 2 times per day Millicent Mueller MD   2 mL at 08/21/24 0835    insulin lispro (ADMELOG,HumaLOG) - Correction Dose   Subcutaneous TID  Sidney Payan MD   1 Units at 08/20/24 1345     Current Facility-Administered Medications   Medication Dose Route Frequency Provider Last Rate Last Admin    sodium chloride 0.9% infusion   Intravenous Continuous PRN Mariza Harding DO        sodium chloride 0.9% infusion   Intravenous Continuous PRN Mariza Harding DO         Current Facility-Administered Medications   Medication Dose Route Frequency Provider Last Rate Last Admin    acetaminophen (TYLENOL) tablet 650 mg  650 mg Oral Q4H PRN Fish Grant MD   650 mg at 08/20/24 1342    melatonin tablet 6 mg  6 mg Oral Nightly PRN Jennifer Torre DO   6 mg at 08/19/24 2100    sodium chloride 0.9 % flush bag 25 mL  25 mL Intravenous PRN Ervin,  MD Millicent        sodium chloride 0.9% infusion   Intravenous Continuous PRN Harding, Simren, DO        sodium chloride 0.9% infusion   Intravenous Continuous PRN Harding, Simren, DO        sodium chloride (NORMAL SALINE) 0.9 % bolus 500 mL  500 mL Intravenous PRN Harding, Simren, DO        sodium chloride 0.9 % flush bag 25 mL  25 mL Intravenous PRN Harding, Simren, DO        sodium citrate anticoagulant 4 % flush 3 mL  3 mL Intracatheter PRN Khadra Simons MD        alteplase (CATHFLO ACTIVASE) injection 2 mg  2 mg Intracatheter PRN Khadra Simons MD        sodium chloride (NORMAL SALINE) 0.9 % bolus 100-200 mL  100-200 mL Intravenous PRN Khadra Simons MD        sodium chloride 0.9 % flush bag 25 mL  25 mL Intravenous PRN Millicent Mueller MD        dextrose 50 % injection 25 g  25 g Intravenous PRN Sidney Payan MD        dextrose 50 % injection 12.5 g  12.5 g Intravenous PRN Sidney Payan MD        glucagon (GLUCAGEN) injection 1 mg  1 mg Intramuscular PRN Sidney Payan MD        dextrose (GLUTOSE) 40 % gel 15 g  15 g Oral PRN Sidney Payan MD        dextrose (GLUTOSE) 40 % gel 30 g  30 g Oral PRN Sidney Payan MD            Laboratory:  Recent Results (from the past 72 hour(s))   GLUCOSE, BEDSIDE - POINT OF CARE    Collection Time: 08/18/24 12:10 PM    Specimen: Blood, Capillary   Result Value Ref Range    GLUCOSE, BEDSIDE - POINT OF CARE 199 (H) 70 - 99 mg/dL   GLUCOSE, BEDSIDE - POINT OF CARE    Collection Time: 08/18/24  5:16 PM    Specimen: Blood, Capillary   Result Value Ref Range    GLUCOSE, BEDSIDE - POINT OF CARE 234 (H) 70 - 99 mg/dL   GLUCOSE, BEDSIDE - POINT OF CARE    Collection Time: 08/18/24  8:13 PM    Specimen: Blood, Capillary   Result Value Ref Range    GLUCOSE, BEDSIDE - POINT OF CARE 214 (H) 70 - 99 mg/dL   Calcium, Ionized    Collection Time: 08/18/24  8:17 PM    Specimen: Blood, Venous   Result Value Ref Range    Ionized Calcium 1.12 (L) 1.15 - 1.29 mmol/L    Ionized Calcium, Corrected 1.12  (L) 1.15 - 1.29 mmol/L   Lactic Acid, Venous    Collection Time: 08/18/24  8:17 PM    Specimen: Blood, Venous   Result Value Ref Range    Lactate, Venous 1.3 0.0 - 2.0 mmol/L   Basic Metabolic Panel    Collection Time: 08/18/24  8:17 PM    Specimen: Blood, Venous   Result Value Ref Range    Fasting Status      Sodium 137 135 - 145 mmol/L    Potassium 3.8 3.4 - 5.1 mmol/L    Chloride 103 97 - 110 mmol/L    Carbon Dioxide 29 21 - 32 mmol/L    Anion Gap 9 7 - 19 mmol/L    Glucose 219 (H) 70 - 99 mg/dL    BUN 52 (H) 6 - 20 mg/dL    Creatinine 2.34 (H) 0.67 - 1.17 mg/dL    Glomerular Filtration Rate 28 (L) >=60    BUN/Cr 22 7 - 25    Calcium 8.5 8.4 - 10.2 mg/dL   BLOOD GAS, VENOUS WITH COOXIMETRY - RESPIRATORY    Collection Time: 08/18/24  8:31 PM    Specimen: Blood, Venous   Result Value Ref Range    CONDITION - RESPIRATORY 4L     CARBOXYHEMOGLOBIN - RESPIRATORY 2.1 (H) <2.1 %    METHEMOGLOBIN - RESPIRATORY 0.0 <=1.6 %    SITE - RESPIRATORY Venous     TEMPERATURE - RESPIRATORY 36.8 degrees C    BASE EXCESS / DEFICIT, VENOUS - RESPIRATORY 3 (H) -2 - 2 mmol/L    HCO3, VENOUS - RESPIRATORY 28.0 22.0 - 28.0 mmol/L    PCO2, VENOUS - RESPIRATORY 45 41 - 54 mm Hg    PH, VENOUS - RESPIRATORY 7.41 7.35 - 7.45 Units    O2 SATURATION, VENOUS - RESPIRATORY 52 (L) 60 - 80 %    PO2, VENOUS - RESPIRATORY 28 (L) 35 - 42 mm Hg    OXYHEMOGLOBIN, VENOUS - RESPIRATORY 50.5 (L) 60.0 - 80.0 %   CALCIUM, IONIZED - RESPIRATORY    Collection Time: 08/18/24  8:31 PM    Specimen: Blood, Venous   Result Value Ref Range    CALCIUM, IONIZED - RESPIRATORY 1.14 (L) 1.15 - 1.29 mmol/L   HEMOGLOBIN - RESPIRATORY    Collection Time: 08/18/24  8:31 PM    Specimen: Blood, Venous   Result Value Ref Range    HEMOGLOBIN - RESPIRATORY 10.8 (L) 13.0 - 17.0 g/dL   POTASSIUM - RESPIRATORY    Collection Time: 08/18/24  8:31 PM    Specimen: Blood, Venous   Result Value Ref Range    POTASSIUM - RESPIRATORY 3.8 3.4 - 5.1 mmol/L   SODIUM - RESPIRATORY    Collection  Time: 08/18/24  8:31 PM    Specimen: Blood, Venous   Result Value Ref Range    SODIUM - RESPIRATORY 133 (L) 135 - 145 mmol/L   LACTIC ACID, VENOUS - RESPIRATORY    Collection Time: 08/18/24  8:31 PM    Specimen: Blood, Venous   Result Value Ref Range    LACTIC ACID, VENOUS - RESPIRATORY 1.2 <2.0 mmol/L   Calcium, Ionized    Collection Time: 08/19/24  4:37 AM    Specimen: Blood, Venous   Result Value Ref Range    Ionized Calcium 1.12 (L) 1.15 - 1.29 mmol/L    Ionized Calcium, Corrected 1.11 (L) 1.15 - 1.29 mmol/L   Lactic Acid, Venous    Collection Time: 08/19/24  4:37 AM    Specimen: Blood, Venous   Result Value Ref Range    Lactate, Venous 0.9 0.0 - 2.0 mmol/L   Comprehensive Metabolic Panel    Collection Time: 08/19/24  4:37 AM    Specimen: Blood, Venous   Result Value Ref Range    Fasting Status      Sodium 135 135 - 145 mmol/L    Potassium 3.7 3.4 - 5.1 mmol/L    Chloride 103 97 - 110 mmol/L    Carbon Dioxide 27 21 - 32 mmol/L    Anion Gap 9 7 - 19 mmol/L    Glucose 119 (H) 70 - 99 mg/dL    BUN 54 (H) 6 - 20 mg/dL    Creatinine 2.50 (H) 0.67 - 1.17 mg/dL    Glomerular Filtration Rate 26 (L) >=60    BUN/Cr 22 7 - 25    Calcium 8.4 8.4 - 10.2 mg/dL    Bilirubin, Total 0.4 0.2 - 1.0 mg/dL    GOT/AST 16 <=37 Units/L    GPT/ALT 18 <64 Units/L    Alkaline Phosphatase 97 45 - 117 Units/L    Albumin 2.4 (L) 3.6 - 5.1 g/dL    Protein, Total 6.5 6.4 - 8.2 g/dL    Globulin 4.1 (H) 2.0 - 4.0 g/dL    A/G Ratio 0.6 (L) 1.0 - 2.4   Magnesium    Collection Time: 08/19/24  4:37 AM    Specimen: Blood, Venous   Result Value Ref Range    Magnesium 2.7 (H) 1.7 - 2.4 mg/dL   Phosphorus    Collection Time: 08/19/24  4:37 AM    Specimen: Blood, Venous   Result Value Ref Range    Phosphorus 4.4 2.4 - 4.7 mg/dL   CBC No Differential    Collection Time: 08/19/24  4:37 AM    Specimen: Blood, Venous   Result Value Ref Range    WBC 10.2 4.2 - 11.0 K/mcL    RBC 3.87 (L) 4.50 - 5.90 mil/mcL    HGB 10.0 (L) 13.0 - 17.0 g/dL    HCT 32.6 (L) 39.0  - 51.0 %    MCV 84.2 78.0 - 100.0 fl    MCH 25.8 (L) 26.0 - 34.0 pg    MCHC 30.7 (L) 32.0 - 36.5 g/dL     140 - 450 K/mcL    RDW-CV 17.5 (H) 11.0 - 15.0 %    RDW-SD 52.7 (H) 39.0 - 50.0 fL    NRBC 0 <=0 /100 WBC   BLOOD GAS, VENOUS WITH COOXIMETRY - RESPIRATORY    Collection Time: 08/19/24  4:50 AM    Specimen: Blood, Venous   Result Value Ref Range    CONDITION - RESPIRATORY 4L NC     CARBOXYHEMOGLOBIN - RESPIRATORY 1.5 <2.1 %    METHEMOGLOBIN - RESPIRATORY 1.1 <=1.6 %    SITE - RESPIRATORY Venous     TEMPERATURE - RESPIRATORY 36.8 degrees C    BASE EXCESS / DEFICIT, VENOUS - RESPIRATORY 1 -2 - 2 mmol/L    HCO3, VENOUS - RESPIRATORY 28.0 22.0 - 28.0 mmol/L    PCO2, VENOUS - RESPIRATORY 51 41 - 54 mm Hg    PH, VENOUS - RESPIRATORY 7.34 (L) 7.35 - 7.45 Units    O2 SATURATION, VENOUS - RESPIRATORY 54 (L) 60 - 80 %    PO2, VENOUS - RESPIRATORY 35 35 - 42 mm Hg    OXYHEMOGLOBIN, VENOUS - RESPIRATORY 52.9 (L) 60.0 - 80.0 %   CALCIUM, IONIZED - RESPIRATORY    Collection Time: 08/19/24  4:50 AM    Specimen: Blood, Venous   Result Value Ref Range    CALCIUM, IONIZED - RESPIRATORY 1.19 1.15 - 1.29 mmol/L   HEMOGLOBIN - RESPIRATORY    Collection Time: 08/19/24  4:50 AM    Specimen: Blood, Venous   Result Value Ref Range    HEMOGLOBIN - RESPIRATORY 10.7 (L) 13.0 - 17.0 g/dL   POTASSIUM - RESPIRATORY    Collection Time: 08/19/24  4:50 AM    Specimen: Blood, Venous   Result Value Ref Range    POTASSIUM - RESPIRATORY 3.7 3.4 - 5.1 mmol/L   SODIUM - RESPIRATORY    Collection Time: 08/19/24  4:50 AM    Specimen: Blood, Venous   Result Value Ref Range    SODIUM - RESPIRATORY 133 (L) 135 - 145 mmol/L   LACTIC ACID, VENOUS - RESPIRATORY    Collection Time: 08/19/24  4:50 AM    Specimen: Blood, Venous   Result Value Ref Range    LACTIC ACID, VENOUS - RESPIRATORY 0.8 <2.0 mmol/L   GLUCOSE, BEDSIDE - POINT OF CARE    Collection Time: 08/19/24  7:29 AM    Specimen: Blood, Capillary   Result Value Ref Range    GLUCOSE, BEDSIDE -  POINT OF CARE 138 (H) 70 - 99 mg/dL   GLUCOSE, BEDSIDE - POINT OF CARE    Collection Time: 08/19/24 11:27 AM    Specimen: Blood, Capillary   Result Value Ref Range    GLUCOSE, BEDSIDE - POINT OF CARE 154 (H) 70 - 99 mg/dL   GLUCOSE, BEDSIDE - POINT OF CARE    Collection Time: 08/19/24  4:56 PM    Specimen: Blood, Capillary   Result Value Ref Range    GLUCOSE, BEDSIDE - POINT OF CARE 174 (H) 70 - 99 mg/dL   GLUCOSE, BEDSIDE - POINT OF CARE    Collection Time: 08/19/24  8:04 PM    Specimen: Blood, Capillary   Result Value Ref Range    GLUCOSE, BEDSIDE - POINT OF CARE 161 (H) 70 - 99 mg/dL   Basic Metabolic Panel    Collection Time: 08/19/24  9:04 PM    Specimen: Blood, Venous   Result Value Ref Range    Fasting Status      Sodium 137 135 - 145 mmol/L    Potassium 3.6 3.4 - 5.1 mmol/L    Chloride 103 97 - 110 mmol/L    Carbon Dioxide 29 21 - 32 mmol/L    Anion Gap 9 7 - 19 mmol/L    Glucose 161 (H) 70 - 99 mg/dL    BUN 40 (H) 6 - 20 mg/dL    Creatinine 2.21 (H) 0.67 - 1.17 mg/dL    Glomerular Filtration Rate 30 (L) >=60    BUN/Cr 18 7 - 25    Calcium 8.4 8.4 - 10.2 mg/dL   BLOOD GAS, VENOUS WITH COOXIMETRY - RESPIRATORY    Collection Time: 08/19/24  9:14 PM    Specimen: Blood, Venous   Result Value Ref Range    CONDITION - RESPIRATORY NC 3     CARBOXYHEMOGLOBIN - RESPIRATORY 1.6 <2.1 %    METHEMOGLOBIN - RESPIRATORY 0.6 <=1.6 %    SITE - RESPIRATORY Venous Line     TEMPERATURE - RESPIRATORY 36.6 degrees C    BASE EXCESS / DEFICIT, VENOUS - RESPIRATORY 4 (H) -2 - 2 mmol/L    HCO3, VENOUS - RESPIRATORY 30.0 (H) 22.0 - 28.0 mmol/L    PCO2, VENOUS - RESPIRATORY 47 41 - 54 mm Hg    PH, VENOUS - RESPIRATORY 7.41 7.35 - 7.45 Units    O2 SATURATION, VENOUS - RESPIRATORY 57 (L) 60 - 80 %    PO2, VENOUS - RESPIRATORY 36 35 - 42 mm Hg    OXYHEMOGLOBIN, VENOUS - RESPIRATORY 55.9 (L) 60.0 - 80.0 %   CALCIUM, IONIZED - RESPIRATORY    Collection Time: 08/19/24  9:14 PM    Specimen: Blood, Venous   Result Value Ref Range    CALCIUM,  IONIZED - RESPIRATORY 1.18 1.15 - 1.29 mmol/L   HEMOGLOBIN - RESPIRATORY    Collection Time: 08/19/24  9:14 PM    Specimen: Blood, Venous   Result Value Ref Range    HEMOGLOBIN - RESPIRATORY 10.4 (L) 13.0 - 17.0 g/dL   POTASSIUM - RESPIRATORY    Collection Time: 08/19/24  9:14 PM    Specimen: Blood, Venous   Result Value Ref Range    POTASSIUM - RESPIRATORY 3.7 3.4 - 5.1 mmol/L   SODIUM - RESPIRATORY    Collection Time: 08/19/24  9:14 PM    Specimen: Blood, Venous   Result Value Ref Range    SODIUM - RESPIRATORY 136 135 - 145 mmol/L   LACTIC ACID, VENOUS - RESPIRATORY    Collection Time: 08/19/24  9:14 PM    Specimen: Blood, Venous   Result Value Ref Range    LACTIC ACID, VENOUS - RESPIRATORY 1.0 <2.0 mmol/L   Phosphorus    Collection Time: 08/20/24  3:26 AM    Specimen: Blood, Venous   Result Value Ref Range    Phosphorus 4.4 2.4 - 4.7 mg/dL   Magnesium    Collection Time: 08/20/24  3:26 AM    Specimen: Blood, Venous   Result Value Ref Range    Magnesium 2.5 (H) 1.7 - 2.4 mg/dL   CBC with Automated Differential (performable only)    Collection Time: 08/20/24  3:26 AM    Specimen: Blood, Venous   Result Value Ref Range    WBC 9.6 4.2 - 11.0 K/mcL    RBC 3.89 (L) 4.50 - 5.90 mil/mcL    HGB 10.1 (L) 13.0 - 17.0 g/dL    HCT 33.1 (L) 39.0 - 51.0 %    MCV 85.1 78.0 - 100.0 fl    MCH 26.0 26.0 - 34.0 pg    MCHC 30.5 (L) 32.0 - 36.5 g/dL    RDW-CV 17.8 (H) 11.0 - 15.0 %    RDW-SD 53.8 (H) 39.0 - 50.0 fL     140 - 450 K/mcL    NRBC 0 <=0 /100 WBC    Neutrophil, Percent 80 %    Lymphocytes, Percent 9 %    Mono, Percent 8 %    Eosinophils, Percent 2 %    Basophils, Percent 0 %    Immature Granulocytes 1 %    Absolute Neutrophils 7.6 1.8 - 7.7 K/mcL    Absolute Lymphocytes 0.9 (L) 1.0 - 4.0 K/mcL    Absolute Monocytes 0.8 0.3 - 0.9 K/mcL    Absolute Eosinophils  0.2 0.0 - 0.5 K/mcL    Absolute Basophils 0.0 0.0 - 0.3 K/mcL    Absolute Immature Granulocytes 0.1 0.0 - 0.2 K/mcL   Basic Metabolic Panel    Collection Time:  08/20/24  3:26 AM    Specimen: Blood, Venous   Result Value Ref Range    Fasting Status      Sodium 137 135 - 145 mmol/L    Potassium 3.3 (L) 3.4 - 5.1 mmol/L    Chloride 104 97 - 110 mmol/L    Carbon Dioxide 26 21 - 32 mmol/L    Anion Gap 10 7 - 19 mmol/L    Glucose 103 (H) 70 - 99 mg/dL    BUN 42 (H) 6 - 20 mg/dL    Creatinine 2.14 (H) 0.67 - 1.17 mg/dL    Glomerular Filtration Rate 31 (L) >=60    BUN/Cr 20 7 - 25    Calcium 8.7 8.4 - 10.2 mg/dL   NT proBNP    Collection Time: 08/20/24  3:26 AM    Specimen: Blood, Venous   Result Value Ref Range    NT-proBNP 45,420 (H) <=450 pg/mL   GLUCOSE, BEDSIDE - POINT OF CARE    Collection Time: 08/20/24  7:59 AM    Specimen: Blood, Capillary   Result Value Ref Range    GLUCOSE, BEDSIDE - POINT OF CARE 122 (H) 70 - 99 mg/dL   GLUCOSE, BEDSIDE - POINT OF CARE    Collection Time: 08/20/24 12:28 PM    Specimen: Blood, Capillary   Result Value Ref Range    GLUCOSE, BEDSIDE - POINT OF CARE 189 (H) 70 - 99 mg/dL   Basic Metabolic Panel    Collection Time: 08/20/24  1:01 PM    Specimen: Blood, Venous   Result Value Ref Range    Fasting Status      Sodium 137 135 - 145 mmol/L    Potassium 4.3 3.4 - 5.1 mmol/L    Chloride 102 97 - 110 mmol/L    Carbon Dioxide 28 21 - 32 mmol/L    Anion Gap 11 7 - 19 mmol/L    Glucose 219 (H) 70 - 99 mg/dL    BUN 24 (H) 6 - 20 mg/dL    Creatinine 1.88 (H) 0.67 - 1.17 mg/dL    Glomerular Filtration Rate 36 (L) >=60    BUN/Cr 13 7 - 25    Calcium 9.2 8.4 - 10.2 mg/dL   GLUCOSE, BEDSIDE - POINT OF CARE    Collection Time: 08/20/24  5:32 PM    Specimen: Blood, Capillary   Result Value Ref Range    GLUCOSE, BEDSIDE - POINT OF CARE 129 (H) 70 - 99 mg/dL   GLUCOSE, BEDSIDE - POINT OF CARE    Collection Time: 08/20/24  7:50 PM    Specimen: Blood, Capillary   Result Value Ref Range    GLUCOSE, BEDSIDE - POINT OF CARE 160 (H) 70 - 99 mg/dL   Basic Metabolic Panel    Collection Time: 08/20/24  8:40 PM    Specimen: Blood, Venous   Result Value Ref Range     Fasting Status      Sodium 136 135 - 145 mmol/L    Potassium 3.6 3.4 - 5.1 mmol/L    Chloride 102 97 - 110 mmol/L    Carbon Dioxide 28 21 - 32 mmol/L    Anion Gap 10 7 - 19 mmol/L    Glucose 153 (H) 70 - 99 mg/dL    BUN 30 (H) 6 - 20 mg/dL    Creatinine 2.33 (H) 0.67 - 1.17 mg/dL    Glomerular Filtration Rate 28 (L) >=60    BUN/Cr 13 7 - 25    Calcium 8.7 8.4 - 10.2 mg/dL   GLUCOSE, BEDSIDE - POINT OF CARE    Collection Time: 08/21/24  2:05 AM    Specimen: Blood, Capillary   Result Value Ref Range    GLUCOSE, BEDSIDE - POINT OF CARE 106 (H) 70 - 99 mg/dL   GLUCOSE, BEDSIDE - POINT OF CARE    Collection Time: 08/21/24  7:21 AM    Specimen: Blood, Capillary   Result Value Ref Range    GLUCOSE, BEDSIDE - POINT OF CARE 99 70 - 99 mg/dL   NT proBNP    Collection Time: 08/21/24  8:57 AM    Specimen: Blood, Venous   Result Value Ref Range    NT-proBNP 33,039 (H) <=450 pg/mL       Radiology  Imaging reviewed:  XR CHEST AP OR PA   Final Result   Impression:   No evidence of visible pneumothorax.               Electronically Signed by: SHIVANI NAIR MD    Signed on: 8/17/2024 11:39 PM    Workstation ID: [id number]      XR CHEST AP OR PA   Final Result   FINDINGS / IMPRESSION:      There has been interval placement of a right internal jugular port central   venous catheter which likely ends in the internal jugular vein at the level   of the right lung apex. Consider repositioning of the catheter so it ends   in the superior vena cava.      There is no evidence of pneumothorax. There has been interval increase in   opacities in bilateral lungs likely due to worsening pulmonary edema. No   significant pleural effusion or pneumothorax is seen. There is unchanged   cardiomegaly with an AICD.      Electronically Signed by: NELIDA LAWSON MD    Signed on: 8/18/2024 12:09 AM    Workstation ID: [id number]      XR CHEST AP OR PA   Final Result   FINDINGS / IMPRESSION:      There are unchanged hazy opacities in both lungs  extending from the dimas   which may be due to edema. No new pulmonary opacity is identified. No   significant pleural effusion or pneumothorax is seen. The heart appears   enlarged. An AICD is again noted.      Electronically Signed by: NELIDA LAWSON MD    Signed on: 8/17/2024 11:18 PM    Workstation ID: [id number]      XR CHEST PA AND LATERAL 2 VIEWS   Final Result   Interval development of CHF with small bibasilar effusions.         Electronically Signed by: LIZBETH BISHOP DO    Signed on: 8/17/2024 2:26 PM    Workstation ID: [id number]      US VASC EXTREMITY LOWER VENOUS DUPLEX BILATERAL   Final Result      No evidence of acute deep vein thrombosis of the bilateral lower   extremities.            Electronically Signed by: KEVIN ESPINOZA MD    Signed on: 8/16/2024 5:28 PM    Workstation ID: [id number]      NM LUNG PERFUSION IMAGING   Final Result   Technically limited study (perfusion only).      No evidence of pulmonary embolism.      Radiation Dosimetry:   The radiopharmaceutical used for this exam delivers approximately 0.4   mSv/mCi (40 mRem/mCi)   Source:  RADIATION DOSE ESTIMATES TO ADULTS AND CHILDREN, [place];   Effective dose RADAR               Electronically Signed by: MUMTAZ NAGEL MD    Signed on: 8/16/2024 2:25 PM    Workstation ID: [id number]      XR CHEST AP OR PA   Final Result      Cardiomegaly with mild pulmonary edema and small left pleural effusion, can   be seen in the setting of cardiac decompensation/fluid overload.            Electronically Signed by: GRACE GODINEZ MD    Signed on: 8/15/2024 6:00 PM    Workstation ID: [id number]           Procedures:  Procedures reviewed:        Boubacar Mcclure MD  8/21/2024  10:03 AM                             No

## 2024-09-23 NOTE — ED BEHAVIORAL HEALTH ASSESSMENT NOTE - LANGUAGE
Restorative Technician Note      Patient Name: Santos Sandoval     Restorative Tech Visit Date: 09/23/24  Note Type: Mobility  Patient Position Upon Consult: Supine  Activity Performed: Ambulated  Assistive Device: Roller walker  Patient Position at End of Consult: All needs within reach; Seated edge of bed              
Other
